# Patient Record
Sex: MALE | Race: WHITE | NOT HISPANIC OR LATINO | Employment: OTHER | URBAN - METROPOLITAN AREA
[De-identification: names, ages, dates, MRNs, and addresses within clinical notes are randomized per-mention and may not be internally consistent; named-entity substitution may affect disease eponyms.]

---

## 2017-03-24 ENCOUNTER — ALLSCRIPTS OFFICE VISIT (OUTPATIENT)
Dept: OTHER | Facility: OTHER | Age: 82
End: 2017-03-24

## 2017-04-03 ENCOUNTER — GENERIC CONVERSION - ENCOUNTER (OUTPATIENT)
Dept: OTHER | Facility: OTHER | Age: 82
End: 2017-04-03

## 2017-10-02 ENCOUNTER — ALLSCRIPTS OFFICE VISIT (OUTPATIENT)
Dept: OTHER | Facility: OTHER | Age: 82
End: 2017-10-02

## 2017-10-02 DIAGNOSIS — E78.00 PURE HYPERCHOLESTEROLEMIA: ICD-10-CM

## 2017-10-02 DIAGNOSIS — G40.909 EPILEPSY WITHOUT STATUS EPILEPTICUS, NOT INTRACTABLE (HCC): ICD-10-CM

## 2017-10-02 DIAGNOSIS — R73.9 HYPERGLYCEMIA: ICD-10-CM

## 2017-10-02 DIAGNOSIS — D69.6 THROMBOCYTOPENIA (HCC): ICD-10-CM

## 2017-10-16 ENCOUNTER — TRANSCRIBE ORDERS (OUTPATIENT)
Dept: LAB | Facility: CLINIC | Age: 82
End: 2017-10-16

## 2017-10-16 ENCOUNTER — GENERIC CONVERSION - ENCOUNTER (OUTPATIENT)
Dept: OTHER | Facility: OTHER | Age: 82
End: 2017-10-16

## 2017-10-16 ENCOUNTER — APPOINTMENT (OUTPATIENT)
Dept: LAB | Facility: CLINIC | Age: 82
End: 2017-10-16
Payer: MEDICARE

## 2017-10-16 DIAGNOSIS — E78.00 PURE HYPERCHOLESTEROLEMIA: ICD-10-CM

## 2017-10-16 DIAGNOSIS — G40.909 EPILEPSY WITHOUT STATUS EPILEPTICUS, NOT INTRACTABLE (HCC): ICD-10-CM

## 2017-10-16 DIAGNOSIS — R73.9 HYPERGLYCEMIA: ICD-10-CM

## 2017-10-16 DIAGNOSIS — D69.6 THROMBOCYTOPENIA (HCC): ICD-10-CM

## 2017-10-16 LAB
ALBUMIN SERPL BCP-MCNC: 3.6 G/DL (ref 3.5–5)
ALP SERPL-CCNC: 77 U/L (ref 46–116)
ALT SERPL W P-5'-P-CCNC: 24 U/L (ref 12–78)
ANION GAP SERPL CALCULATED.3IONS-SCNC: 6 MMOL/L (ref 4–13)
AST SERPL W P-5'-P-CCNC: 14 U/L (ref 5–45)
BASOPHILS # BLD AUTO: 0.03 THOUSANDS/ΜL (ref 0–0.1)
BASOPHILS NFR BLD AUTO: 1 % (ref 0–1)
BILIRUB SERPL-MCNC: 0.59 MG/DL (ref 0.2–1)
BUN SERPL-MCNC: 18 MG/DL (ref 5–25)
CALCIUM SERPL-MCNC: 8.8 MG/DL (ref 8.3–10.1)
CHLORIDE SERPL-SCNC: 107 MMOL/L (ref 100–108)
CHOLEST SERPL-MCNC: 184 MG/DL (ref 50–200)
CO2 SERPL-SCNC: 28 MMOL/L (ref 21–32)
CREAT SERPL-MCNC: 1.02 MG/DL (ref 0.6–1.3)
EOSINOPHIL # BLD AUTO: 0.24 THOUSAND/ΜL (ref 0–0.61)
EOSINOPHIL NFR BLD AUTO: 4 % (ref 0–6)
ERYTHROCYTE [DISTWIDTH] IN BLOOD BY AUTOMATED COUNT: 13.4 % (ref 11.6–15.1)
EST. AVERAGE GLUCOSE BLD GHB EST-MCNC: 91 MG/DL
GFR SERPL CREATININE-BSD FRML MDRD: 69 ML/MIN/1.73SQ M
GLUCOSE P FAST SERPL-MCNC: 94 MG/DL (ref 65–99)
HBA1C MFR BLD: 4.8 % (ref 4.2–6.3)
HCT VFR BLD AUTO: 44 % (ref 36.5–49.3)
HDLC SERPL-MCNC: 56 MG/DL (ref 40–60)
HGB BLD-MCNC: 15.1 G/DL (ref 12–17)
LDLC SERPL CALC-MCNC: 104 MG/DL (ref 0–100)
LYMPHOCYTES # BLD AUTO: 1.18 THOUSANDS/ΜL (ref 0.6–4.47)
LYMPHOCYTES NFR BLD AUTO: 21 % (ref 14–44)
MCH RBC QN AUTO: 33.5 PG (ref 26.8–34.3)
MCHC RBC AUTO-ENTMCNC: 34.3 G/DL (ref 31.4–37.4)
MCV RBC AUTO: 98 FL (ref 82–98)
MONOCYTES # BLD AUTO: 0.63 THOUSAND/ΜL (ref 0.17–1.22)
MONOCYTES NFR BLD AUTO: 11 % (ref 4–12)
NEUTROPHILS # BLD AUTO: 3.43 THOUSANDS/ΜL (ref 1.85–7.62)
NEUTS SEG NFR BLD AUTO: 63 % (ref 43–75)
NRBC BLD AUTO-RTO: 0 /100 WBCS
PLATELET # BLD AUTO: 109 THOUSANDS/UL (ref 149–390)
PMV BLD AUTO: 10.4 FL (ref 8.9–12.7)
POTASSIUM SERPL-SCNC: 4.2 MMOL/L (ref 3.5–5.3)
PROT SERPL-MCNC: 6.7 G/DL (ref 6.4–8.2)
RBC # BLD AUTO: 4.51 MILLION/UL (ref 3.88–5.62)
SODIUM SERPL-SCNC: 141 MMOL/L (ref 136–145)
TRIGL SERPL-MCNC: 120 MG/DL
WBC # BLD AUTO: 5.52 THOUSAND/UL (ref 4.31–10.16)

## 2017-10-16 PROCEDURE — 80053 COMPREHEN METABOLIC PANEL: CPT

## 2017-10-16 PROCEDURE — 36415 COLL VENOUS BLD VENIPUNCTURE: CPT

## 2017-10-16 PROCEDURE — 83036 HEMOGLOBIN GLYCOSYLATED A1C: CPT

## 2017-10-16 PROCEDURE — 80061 LIPID PANEL: CPT

## 2017-10-16 PROCEDURE — 85025 COMPLETE CBC W/AUTO DIFF WBC: CPT

## 2017-11-16 ENCOUNTER — TRANSCRIBE ORDERS (OUTPATIENT)
Dept: SLEEP CENTER | Facility: CLINIC | Age: 82
End: 2017-11-16

## 2017-11-16 ENCOUNTER — HOSPITAL ENCOUNTER (OUTPATIENT)
Dept: SLEEP CENTER | Facility: CLINIC | Age: 82
Discharge: HOME | End: 2017-11-16
Payer: MEDICARE

## 2017-11-16 DIAGNOSIS — G47.33 OBSTRUCTIVE SLEEP APNEA SYNDROME: Primary | ICD-10-CM

## 2017-11-17 NOTE — CONSULTS
Consultation - Ankur 207 81 y o  male MRN: [id number]          Reason for Consult / Principal Problem:    1  History of obstructive sleep apnea  2  Re-evaluation and continuance of care     HPI: Toño Hough is a 81y o  year old male  He has been using nasal CPAP since 1988 for treatment of obstructive sleep apnea  His diagnosis was initially made by Dr Laurie Burton count done at the [address] [place] [address]  He has been using nasal CPAP since that time  He has tolerated treatment quite well and requires continuing care as well as re-evaluation of diagnosis and treatment  Employment:  He is a retired     Sleep Schedule:       Bedtime:  11:30 p m  to midnight       Latency:  Brief      Wakeup time:  8:00 a m  to 8:30 a m  on weekdays, 6:30 a m  on Sundays    Awakenings:       Frequency:  0 per night      Causes:  None       Daytime Sleepiness / Inappropriate Sleep:       Most severe:  Early to mid afternoon       Naps :  3 per week   Time:  Early to mid afternoon   Duration:  30 minutes      Inappropriate drowsiness / sleep:  Sitting and reading, watching television, as a passenger in a car, lying down to rest in the afternoon    Snoring:  Loud without CPAP, associated snorting    Apnea: Witnessed by others    Change in Weight:  Fluctuation over the last several years by about 10 lb    RLS:  No clinical symptoms consistent with this diagnosis     Other Complaints:  Denied     Social History:      Caffeine:  20 oz of coffee daily       Tobacco:  Prior cigarette smoking, discontinued 50 years ago       Alcohol:  1 cocktail per day       Drugs:  Denied     Past medical, past surgical,and family history can be reviewed in the patient's chart  Allergies and medications can be reviewed in the patient's chart        Signs         Weight:    215 2 lb (97 7 kg)  Height:    71 inches  BMI:     29 8 kg   Blood Pressure:   138/70  Heart Rate:    66 and regular  Neck Circumference:  46 cm  ESS:     5    Physical Exam  General Appearance:   Alert, cooperative, no distress, appears stated age     Head:   Normocephalic, without obvious abnormality, atraumatic     Eyes:   PERRL, conjunctiva/corneas clear, EOM's intact          Nose:  Nares normal, septum midline, mucosa normal, no drainage or sinus tenderness     Throat:  Lips, teeth and gums normal; tongue slightly increased in size but normal in  shape and midline in position; mucosa redundant bilaterally, uvula not well visualized, tonsils not identified, Mallampati class 4       Neck:  Supple, symmetrical, trachea midline, no adenopathy; Thyroid: No enlargement, tenderness or nodules; no carotid bruit or JVD   Lungs:    Clear to auscultation bilaterally, respirations unlabored     Heart:   Regular rate and rhythm, S1 and S2 normal, no murmur, rub or gallop       Extremities:  Extremities normal, atraumatic, no cyanosis or edema     Pulses:  2+ and symmetric all extremities     Skin:  Skin color, texture, turgor normal, no rashes or lesions     Lymph nodes:      Cervical and supraclavicular normal       Neurologic:    CNII-XII intact  Normal strength, sensation throughout     Sleep Study Results: The result of his original study are currently not available  The patient reports that he was diagnosed with severe sleep apnea and was placed on nasal CPAP using 10 cm of water pressure  ASSESSMENT / PLAN     Assessment:  1  History of obstructive sleep apnea  2  Long-term history of treatment using nasal CPAP  3  History of seizure disorder-currently well controlled    Plan:  1  Repeat polysomnogram as late night study  2  Supply replacement equipment as necessary  3  Follow-up for compliance data 4 to 12 weeks after receiving new equipment    Counseling / Coordination of Care  Total clinic time spent today 40 minutes   Greater than 50% of total time was spent with the patient and / or family counseling and / or coordination of care  A description of the counseling / coordination of care: We discussed his previous diagnosis of obstructive sleep apnea as well as ongoing use of nasal CPAP  He has been at the same level of pressure since the time of his initial diagnosis he feels quite well using this treatment  There is some evidence of mild daytime sleepiness  He denies snoring or other sounds while wearing nasal CPAP  Most of our time together was spent talking about his use of nasal CPAP  His compliance seems to be excellent  I asked him about caring for his equipment  We then talked about typical methods and frequency of cleaning his mask, tubing and humidifier chamber  We also talked about changing filters periodic limb to ensure proper treatment parameters  He is aware that he has evidence of mild daytime sleepiness  I have told him that this may indicate a small amount of residual abnormal breathing  His study will be performed in split night fashion so that we can both identify both the presence and severity of his obstructive sleep apnea  Nasal CPAP will then be titrated to the most appropriate pressure to maintain upper airway patency during sleep  Following testing he will return to the [facility] for a review of his test results              Emelie Olszewski, DO    Board Certified in Sleep Disorders Medicine

## 2017-12-03 ENCOUNTER — HOSPITAL ENCOUNTER (OUTPATIENT)
Dept: SLEEP CENTER | Facility: CLINIC | Age: 82
Discharge: HOME | End: 2017-12-03
Payer: MEDICARE

## 2017-12-03 DIAGNOSIS — G47.33 OBSTRUCTIVE SLEEP APNEA SYNDROME: ICD-10-CM

## 2017-12-03 PROCEDURE — 95810 POLYSOM 6/> YRS 4/> PARAM: CPT

## 2017-12-08 ENCOUNTER — TRANSCRIBE ORDERS (OUTPATIENT)
Dept: SLEEP CENTER | Facility: CLINIC | Age: 82
End: 2017-12-08

## 2017-12-08 DIAGNOSIS — G47.33 OSA (OBSTRUCTIVE SLEEP APNEA): Primary | ICD-10-CM

## 2017-12-27 ENCOUNTER — HOSPITAL ENCOUNTER (OUTPATIENT)
Dept: SLEEP CENTER | Facility: CLINIC | Age: 82
Discharge: HOME | End: 2017-12-28
Payer: MEDICARE

## 2017-12-27 DIAGNOSIS — G47.33 OSA (OBSTRUCTIVE SLEEP APNEA): ICD-10-CM

## 2017-12-27 PROCEDURE — 95811 POLYSOM 6/>YRS CPAP 4/> PARM: CPT

## 2018-01-04 ENCOUNTER — TRANSCRIBE ORDERS (OUTPATIENT)
Dept: SLEEP CENTER | Facility: CLINIC | Age: 83
End: 2018-01-04

## 2018-01-04 DIAGNOSIS — G47.33 OSA (OBSTRUCTIVE SLEEP APNEA): Primary | ICD-10-CM

## 2018-01-05 ENCOUNTER — HOSPITAL ENCOUNTER (OUTPATIENT)
Dept: SLEEP CENTER | Facility: CLINIC | Age: 83
Discharge: HOME | End: 2018-01-06
Payer: MEDICARE

## 2018-01-05 ENCOUNTER — TRANSCRIBE ORDERS (OUTPATIENT)
Dept: SLEEP CENTER | Facility: CLINIC | Age: 83
End: 2018-01-05

## 2018-01-05 DIAGNOSIS — G47.33 OBSTRUCTIVE SLEEP APNEA SYNDROME: Primary | ICD-10-CM

## 2018-01-05 DIAGNOSIS — G47.33 OBSTRUCTIVE SLEEP APNEA SYNDROME: ICD-10-CM

## 2018-01-05 NOTE — PROGRESS NOTES
Progress Note - Sleep Center   Manoj Diamond 81 y o  male MRN: [id number]        Follow Up Evaluation / Problem:     1  History of obstructive sleep apnea  2  Need for replacement CPAP machine    3  History of seizure disorder-currently well controlled    HPI: Vianey Buckner is a 81y o  year old male  He has a long history of obstructive sleep apnea and has been using nasal CPAP consistently for many years  He reports that his most recent equipment was obtained in about 2003  His previous CPAP setting was 10 centimeters of water  He originally arrived here looking for continuation of care for obstructive sleep apnea and to obtain new CPAP equipment if possible  ROS: See HPI, all other systems are negative  Historical Information     Past History Since Last Sleep Center Visit:     A diagnostic polysomnogram completed on 12/03/2017 was abnormal demonstrating an AHI of 19 2 events per hour  During sleep in the supine position this value reached 72 events per hour  Lowest measured oxygen saturation was 87 percent  Nasal CPAP was titrated on 12/27/2017  He seemed to be fairly well controlled using 9 centimeters of nasal CPAP  ALLERGIES  Recorded on medication sheet in chart     MEDICATION  Recorded on medication sheet in chart    OBJECTIVE    Vital signs are recorded in patient's chart    PAP Pressure:  Nasal CPAP set to deliver 10 centimeters of water pressure  DME Provider: [employer]    Physical Exam: No significant changes other than reported in HPI  ASSESSMENT / PLAN    Assessment:   1  Obstructive sleep apnea  2  Good response to nasal CPAP using 9 centimeters of water pressure  3  Previous use of CPAP set to deliver 10 centimeters of water pressure  4  History of seizure disorder-currently well controlled    Plan:  1  The patient will receive new CPAP equipment and supplies  2  CPAP will be set to deliver 10 centimeters of water pressure  3    Compliance data will be obtained 6 to 12 weeks following initiation of treatment    Counseling / Coordination of Care  Total clinic time spent today 20 minutes  Greater than 50% of total time was spent with The patient and / or family counseling and / or coordination of care  A description of the counseling and / or coordination of care: Most of our time was spent reviewing his polysomnographic studies  His diagnostic study showed moderate obstructive sleep apnea which became very severe in the supine position  During the testing phase his abnormal nocturnal breathing was well controlled using 9 centimeters of water pressure  He has been using 10 centimeters of CPAP over the years and is quite comfortable at that setting  He will receive new CPAP equipment and supplies  His equipment will be set to deliver 10 centimeters of water pressure  He will return in 6 to 12 weeks for a download of compliance data and a review of his clinical condition  Any necessary changes will be made based on his response to treatment  Dagoberto Beltran DO    Board Certified in [employer]

## 2018-01-09 ENCOUNTER — HOSPITAL ENCOUNTER (OUTPATIENT)
Dept: SLEEP CENTER | Facility: CLINIC | Age: 83
Discharge: HOME | End: 2018-01-10

## 2018-01-09 DIAGNOSIS — G47.33 OSA (OBSTRUCTIVE SLEEP APNEA): ICD-10-CM

## 2018-01-11 NOTE — RESULT NOTES
Verified Results  (1) COMPREHENSIVE METABOLIC PANEL [id number] 23:83BC Julianna Lynne    Order Number: [id number]     Test Name Result Flag Reference   SODIUM 141 mmol/L  136-145   POTASSIUM 4 2 mmol/L  3 5-5 3   CHLORIDE 107 mmol/L  100-108   CARBON DIOXIDE 28 mmol/L  21-32   ANION GAP (CALC) 6 mmol/L  4-13   BLOOD UREA NITROGEN 18 mg/dL  5-25   CREATININE 1 02 mg/dL  0 60-1 30   Standardized to IDMS reference method   CALCIUM 8 8 mg/dL  8 3-10 1   BILI, TOTAL 0 59 mg/dL  0 20-1 00   ALK PHOSPHATAS 77 U/L     ALT (SGPT) 24 U/L  12-78   Specimen collection should occur prior to Sulfasalazine and/or Sulfapyridine administration due to the potential for falsely depressed results  AST(SGOT) 14 U/L  5-45   Specimen collection should occur prior to Sulfasalazine administration due to the potential for falsely depressed results  ALBUMIN 3 6 g/dL  3 5-5 0   TOTAL PROTEIN 6 7 g/dL  6 4-8 2   eGFR 69 ml/min/1 73sq m     National Kidney Disease Education Program recommendations are as follows:  GFR calculation is accurate only with a steady state creatinine  Chronic Kidney disease less than 60 ml/min/1 73 sq  meters  Kidney failure less than 15 ml/min/1 73 sq  meters  GLUCOSE FASTING 94 mg/dL  65-99   Specimen collection should occur prior to Sulfasalazine administration due to the potential for falsely depressed results  Specimen collection should occur prior to Sulfapyridine administration due to the potential for falsely elevated results       (1) CBC/PLT/DIFF 16Oct2017 08:58AM [facility] Number: [id number]     Test Name Result Flag Reference   WBC COUNT 5 52 Thousand/uL  4 31-10 16   RBC COUNT 4 51 Million/uL  3 88-5 62   HEMOGLOBIN 15 1 g/dL  12 0-17 0   HEMATOCRIT 44 0 %  36 5-49 3   MCV 98 fL  82-98   MCH 33 5 pg  26 8-34 3   MCHC 34 3 g/dL  31 4-37 4   RDW 13 4 %  11 6-15 1   MPV 10 4 fL  8 9-12 7   PLATELET COUNT 732 Thousands/uL L 149-390   nRBC AUTOMATED 0 /100 WBCs     NEUTROPHILS RELATIVE PERCENT 63 %  43-75   LYMPHOCYTES RELATIVE PERCENT 21 %  14-44   MONOCYTES RELATIVE PERCENT 11 %  4-12   EOSINOPHILS RELATIVE PERCENT 4 %  0-6   BASOPHILS RELATIVE PERCENT 1 %  0-1   NEUTROPHILS ABSOLUTE COUNT 3 43 Thousands/? ??L  1 85-7 62   LYMPHOCYTES ABSOLUTE COUNT 1 18 Thousands/? ??L  0 60-4 47   MONOCYTES ABSOLUTE COUNT 0 63 Thousand/? ??L  0 17-1 22   EOSINOPHILS ABSOLUTE COUNT 0 24 Thousand/? ??L  0 00-0 61   BASOPHILS ABSOLUTE COUNT 0 03 Thousands/? ??L  0 00-0 10     (1) LIPID PANEL, FASTING [id number] 08:58AM Obinna Acevedoe Order Number: [id number]     Test Name Result Flag Reference   CHOLESTEROL 184 mg/dL     HDL,DIRECT 56 mg/dL  40-60   Specimen collection should occur prior to Metamizole administration due to the potential for falsley depressed results  LDL CHOLESTEROL CALCULATED 104 mg/dL H 0-100   Triglyceride:        Normal <150 mg/dl   Borderline High 150-199 mg/dl   High 200-499 mg/dl   Very High >499 mg/dl      Cholesterol:       Desirable <200 mg/dl    Borderline High 200-239 mg/dl    High >239 mg/dl      HDL Cholesterol:       High>59 mg/dL    Low <41 mg/dL      This screening LDL is a calculated result  It does not have the accuracy of the Direct Measured LDL in the monitoring of patients with hyperlipidemia and/or statin therapy  Direct Measure LDL (KAA108) must be ordered separately in these patients  TRIGLYCERIDES 120 mg/dL  <=150   Specimen collection should occur prior to N-Acetylcysteine or Metamizole administration due to the potential for falsely depressed results  (1) HEMOGLOBIN A1C 16Oct2017 08:58AM Armando Montgomery Yuriy Order Number: [id number]     Test Name Result Flag Reference   HEMOGLOBIN A1C 4 8 %  4 2-6 3   EST  AVG   GLUCOSE 91 mg/dl

## 2018-01-12 VITALS
BODY MASS INDEX: 30.78 KG/M2 | RESPIRATION RATE: 16 BRPM | SYSTOLIC BLOOD PRESSURE: 122 MMHG | DIASTOLIC BLOOD PRESSURE: 78 MMHG | WEIGHT: 215 LBS | HEIGHT: 70 IN | HEART RATE: 60 BPM

## 2018-01-14 VITALS
HEART RATE: 60 BPM | DIASTOLIC BLOOD PRESSURE: 80 MMHG | WEIGHT: 218 LBS | HEIGHT: 70 IN | RESPIRATION RATE: 16 BRPM | SYSTOLIC BLOOD PRESSURE: 128 MMHG | BODY MASS INDEX: 31.21 KG/M2

## 2018-01-15 NOTE — RESULT NOTES
Verified Results  (1) LIPID PANEL, FASTING [id number] 09:33AM Armando Arciniega Order Number: [id number]     Test Name Result Flag Reference   CHOLESTEROL 205 mg/dL H    HDL,DIRECT 60 mg/dL  40-60   Specimen collection should occur prior to Metamizole administration due to the potential for falsely depressed results  LDL CHOLESTEROL CALCULATED 116 mg/dL H 0-100   - Patient Instructions: This is a fasting blood test  Water,black tea or black  coffee only after 9:00pm the night before test   Drink 2 glasses of water the morning of test     - Patient Instructions: This is a fasting blood test  Water,black tea or black  coffee only after 9:00pm the night before test Drink 2 glasses of water the morning of test   Triglyceride:         Normal              <150 mg/dl       Borderline High    150-199 mg/dl       High               200-499 mg/dl       Very High          >499 mg/dl  Cholesterol:         Desirable        <200 mg/dl      Borderline High  200-239 mg/dl      High             >239 mg/dl  HDL Cholesterol:        High    >59 mg/dL      Low     <41 mg/dL  LDL CALCULATED:    This screening LDL is a calculated result  It does not have the accuracy of the Direct Measured LDL in the monitoring of patients with hyperlipidemia and/or statin therapy  Direct Measure LDL (GSC404) must be ordered separately in these patients  TRIGLYCERIDES 145 mg/dL  <=150   Specimen collection should occur prior to N-Acetylcysteine or Metamizole administration due to the potential for falsely depressed results  (1) COMPREHENSIVE METABOLIC PANEL [id number] 89:69UY Armando Johnson Arciniega Order Number: [id number]     Test Name Result Flag Reference   GLUCOSE,RANDM 97 mg/dL     If the patient is fasting, the ADA then defines impaired fasting glucose as > 100 mg/dL and diabetes as > or equal to 123 mg/dL     SODIUM 138 mmol/L  136-145   POTASSIUM 4 2 mmol/L  3 5-5 3   CHLORIDE 103 mmol/L  100-108 CARBON DIOXIDE 30 mmol/L  21-32   ANION GAP (CALC) 5 mmol/L  4-13   BLOOD UREA NITROGEN 19 mg/dL  5-25   CREATININE 1 06 mg/dL  0 60-1 30   Standardized to IDMS reference method   CALCIUM 8 6 mg/dL  8 3-10 1   BILI, TOTAL 0 74 mg/dL  0 20-1 00   ALK PHOSPHATAS 79 U/L     ALT (SGPT) 24 U/L  12-78   AST(SGOT) 15 U/L  5-45   ALBUMIN 3 6 g/dL  3 5-5 0   TOTAL PROTEIN 6 6 g/dL  6 4-8 2   eGFR Non-African American      >60 0 ml/min/1 73sq m   - Patient Instructions: This is a fasting blood test  Water,black tea or black  coffee only after 9:00pm the night before test Drink 2 glasses of water the morning of test   National Kidney Disease Education Program recommendations are as follows:  GFR calculation is accurate only with a steady state creatinine  Chronic Kidney disease less than 60 ml/min/1 73 sq  meters  Kidney failure less than 15 ml/min/1 73 sq  meters  (1) HEMOGLOBIN A1C 03Oct2016 09:33AM [facility] Order Number: [id number]     Test Name Result Flag Reference   HEMOGLOBIN A1C 4 7 %  4 2-6 3   EST  AVG  GLUCOSE 88 mg/dl       (1) PSA (SCREEN) (Dx V76 44 Screen for Prostate Cancer) [id number] 09:33AM [facility] Order Number: [id number]     Test Name Result Flag Reference   PROSTATE SPECIFIC ANTIGEN 3 0 ng/mL  0 0-4 0   - Patient Instructions: This test is non-fasting  Please drink two glasses of water morning of bloodwork  - Patient Instructions: This test is non-fasting  Please drink two glasses of water morning of bloodwork       (1) CBC/PLT/DIFF 03Oct2016 09:33AM [facility] Barrera Madison    Order Number: [id number]     Test Name Result Flag Reference   WBC COUNT 4 74 Thousand/uL  4 31-10 16   RBC COUNT 4 57 Million/uL  3 88-5 62   HEMOGLOBIN 15 4 g/dL  12 0-17 0   HEMATOCRIT 43 6 %  36 5-49 3   MCV 95 fL  82-98   MCH 33 7 pg  26 8-34 3   MCHC 35 3 g/dL  31 4-37 4   RDW 13 5 %  11 6-15 1   MPV 10 5 fL  8 9-12 7   PLATELET COUNT 736 Thousands/uL L 149-390   nRBC AUTOMATED 0 /100 WBCs     NEUTROPHILS RELATIVE PERCENT 67 %  43-75   LYMPHOCYTES RELATIVE PERCENT 20 %  14-44   MONOCYTES RELATIVE PERCENT 9 %  4-12   EOSINOPHILS RELATIVE PERCENT 4 %  0-6   BASOPHILS RELATIVE PERCENT 0 %  0-1   NEUTROPHILS ABSOLUTE COUNT 3 17 Thousands/?L  1 85-7 62   LYMPHOCYTES ABSOLUTE COUNT 0 94 Thousands/?L  0 60-4 47   MONOCYTES ABSOLUTE COUNT 0 44 Thousand/?L  0 17-1 22   EOSINOPHILS ABSOLUTE COUNT 0 17 Thousand/?L  0 00-0 61   BASOPHILS ABSOLUTE COUNT 0 02 Thousands/?L  0 00-0 10   - Patient Instructions: This bloodwork is non-fasting  Please drink two glasses of water morning of bloodwork  - Patient Instructions: This bloodwork is non-fasting  Please drink two glasses of water morning of bloodwork

## 2018-01-16 NOTE — PROGRESS NOTES
Assessment    1  Sleep apnea (780 57) (G47 30)   · on CPAP   2  Thrombocytopenia (287 5) (D69 6)   3  Hypercholesteremia (272 0) (E78 00)   4  Hyperglycemia (790 29) (R73 9)    Plan  Convulsions, epileptic, Thrombocytopenia    · (1) CBC/PLT/DIFF; Status:Active; Requested [id number];    · (1) COMPREHENSIVE METABOLIC PANEL; Status:Active; Requested [id number]:[id number];   Exercise counseling    · There are many exercise options for seniors ; Status:Complete;   Done: 35WMZ8433  Flu vaccine need    · Fluzone High-Dose 0 5 ML Intramuscular Suspension Prefilled Syringe  Hypercholesteremia    · (1) LIPID PANEL, FASTING; Status:Active; Requested [id number];   Hyperglycemia    · (1) HEMOGLOBIN A1C; Status:Active; Requested [id number];   Medicare annual wellness visit, subsequent    · *VB - Fall Risk Assessment  (Dx Z13 89 Screen for Neurologic Disorder);  Status:Complete;   Done: 87RUQ4924 03:09PM   · *VB - Urinary Incontinence Screen (Dx Z13 89 Screen for UI); Status:Complete;   Done:  77LYN4236 03:09PM   · *VB-Depression Screening; Status:Complete;   Done: 69EHU3337 03:09PM  Sleep apnea    · 1 - Patricia Beltran DO  (Neurology) Co-Management  *  Status: Complete  Done:  80WKY5275  Care Summary provided  : Yes    Discussion/Summary    Patient presented today for Medicare wellness visit  He is doing quite well overall  He remains active without significant limitations  He is having no cognitive issues or psychiatric problems  He is able to complete all of his own ADLs without difficulty  He does have some minor hearing loss and follows routinely with audiology  He is up-to-date on all vaccines  He no longer requires colonoscopy or prostate cancer screening  He has a living will and will get us a copy of it at some point  Impression: Subsequent Annual Wellness Visit  Chief Complaint  AWV  Flu shot given today      History of Present Illness  Welcome to Medicare and Wellness Visits:  The patient is being seen for the subsequent annual wellness visit  Medicare Screening and Risk Factors   Hospitalizations: no previous hospitalizations  Medicare Screening Tests Risk Questions   Drug and Alcohol Use: The patient is a former cigarette smoker  The patient reports occasional alcohol use  He has never used illicit drugs  Diet and Physical Activity: Current diet includes well balanced meals, low salt food choices, limited junk food, 1 servings of vegetables per day, 1 servings of meat per day, 1 servings of dairy products per day, 2-3 cups of coffee per day and 2 cups of water daily  He exercises infrequently and exercises 1-2 times per week  Exercise: walking, stretching  Mood Disorder and Cognitive Impairment Screening:   Depression screening  negative for symptoms  He denies feeling down, depressed, or hopeless over the past two weeks  He denies feeling little interest or pleasure in doing things over the past two weeks  Cognitive impairment screening: denies difficulty learning/retaining new information, denies difficulty handling complex tasks, denies difficulty with reasoning, denies difficulty with spatial ability and orientation, denies difficulty with language and denies difficulty with behavior  Functional Ability/Level of Safety: Hearing is slightly decreased  He denies hearing difficulties  He uses a hearing aid  Activities of daily living details: does not need help using the phone, no transportation help needed, does not need help shopping, no meal preparation help needed, does not need help doing housework, does not need help doing laundry, does not need help managing medications and does not need help managing money  Fall risk factors: The patient fell 0 times in the past 12 months  Home safety risk factors:  loose rugs, but no unfamiliar surroundings, no poor household lighting, no uneven floors, no household clutter, grab bars in the bathroom and handrails on the stairs     Advance Directives: Advance directives: living will, durable power of  for health care directives, advance directives and Pt Instructed to bring in copy of living will at next appt  Co-Managers and Medical Equipment/Suppliers: See Patient Care Team      Active Problems    1  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)   2  Convulsions, 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)   3  Flu vaccine need (V04 81) (Z23)   4  Hypercholesteremia (272 0) (E78 00)   5  Hyperglycemia (790 29) (R73 9)   6  Medicare annual wellness visit, subsequent (V70 0) (Z00 00)   7  Sleep apnea (780 57) (G47 30)   8  Thrombocytopenia (287 5) (D69 6)    Past Medical History    · History of vertigo (V12 49) (Z87 898)   · History of Impacted cerumen, unspecified laterality (380 4) (H61 20)   · History of Need for pneumococcal vaccination (V03 82) (Z23)    Surgical History    · History of Blepharoplasty Upper Lid W/ Excessive Skin   · History of Complete Colonoscopy For Polyp Removal   · History of Gingival Incision    Family History  Family History    · Family history of Stroke Syndrome (V17 1)    Social History    · Being A Social Drinker   · Former smoker (P52 99) (J14 546)   · Quit some time in 65s  Smoked for about 10 years a ppd    Current Meds   1  Aspirin Low Dose 81 MG TABS; Therapy: (Di Gao) to Recorded   2  Calcium + D TABS; TAKE 1 TABLET DAILY; Therapy: (Recorded:24Mar2017) to Recorded   3  Daily Multiple Vitamin TABS; Take 1 tablet daily; Therapy: (Recorded:24Mar2017) to Recorded   4  Keppra 1000 MG Oral Tablet; TAKE 1 TABLET TWICE DAILY; Therapy: (Recorded:24Wso5892) to Recorded    Allergies    1  No Known Drug Allergies    Immunizations   ** Printed in Appendix #1 below       Vitals  Signs    Heart Rate: 60  Respiration: 16  Systolic: 197  Diastolic: 78  Height: 5 ft 10 in  Weight: 215 lb   BMI Calculated: 30 85  BSA Calculated: 2 15    Results/Data  *VB - Fall Risk Assessment  (Dx Z13 89 Screen for Neurologic Disorder) 58YHL4406 03:09PM [place] Alfredo Barriga     Test Name Result Flag Reference   Falls Risk      No falls in the past year     *VB - Urinary Incontinence Screen (Dx Z13 89 Screen for UI) [id number] 03:09PM Matilde Yan     Test Name Result Flag Reference   Urinary Incontinence Assessment [id number]       *VB-Depression Screening 97VDE7021 03:09PM Matildezohra Mckeonw     Test Name Result Flag Reference   Depression Scale Result      Depression Screen - Negative For Symptoms       Health Management  Hyperglycemia   *VB - Eye Exam; every 1 year; Last 2016; Next Due: 73TPG5394; Near Due    Future Appointments    Date/Time Provider Specialty Site   10/03/2018 01:00 PM CHEEYNNE Regan   Family Medicine [facility] [address]     Signatures   Electronically signed by : CHEYENNE Emmanuel ; Oct  2 2017  9:12PM EST                       (Author)    Appendix #1     Patient: Melissa Robles; : 1935; MRN: [id number]      1 2 3 4 5 6    Influenza  10-Oct-2011 18-Oct-2012 04-Oct-2013 18-Oct-2014 10-Oct-2015 15-Sep-2016    PCV  25-Mar-2015         PPSV  18-Oct-2012         Td/DT  2004         Tdap  03-Oct-2016         Zoster  2010

## 2018-03-22 ENCOUNTER — OFFICE VISIT (OUTPATIENT)
Dept: SLEEP CENTER | Facility: CLINIC | Age: 83
End: 2018-03-22
Payer: MEDICARE

## 2018-03-22 VITALS
DIASTOLIC BLOOD PRESSURE: 64 MMHG | BODY MASS INDEX: 30.41 KG/M2 | HEART RATE: 72 BPM | WEIGHT: 217.2 LBS | SYSTOLIC BLOOD PRESSURE: 116 MMHG | HEIGHT: 71 IN

## 2018-03-22 DIAGNOSIS — E66.09 CLASS 1 OBESITY DUE TO EXCESS CALORIES WITHOUT SERIOUS COMORBIDITY WITH BODY MASS INDEX (BMI) OF 30.0 TO 30.9 IN ADULT: ICD-10-CM

## 2018-03-22 DIAGNOSIS — G47.33 OSA (OBSTRUCTIVE SLEEP APNEA): Primary | ICD-10-CM

## 2018-03-22 PROBLEM — E66.811 CLASS 1 OBESITY DUE TO EXCESS CALORIES WITHOUT SERIOUS COMORBIDITY WITH BODY MASS INDEX (BMI) OF 30.0 TO 30.9 IN ADULT: Status: ACTIVE | Noted: 2018-03-22

## 2018-03-22 PROCEDURE — 99213 OFFICE O/P EST LOW 20 MIN: CPT | Performed by: PSYCHIATRY & NEUROLOGY

## 2018-03-22 RX ORDER — LEVETIRACETAM 1000 MG/1
1 TABLET ORAL 2 TIMES DAILY
COMMUNITY

## 2018-03-22 NOTE — PATIENT INSTRUCTIONS
1   Continue nasal CPAP at 9 cm water  2  Supply prescription for new equipment over the next 12 months  3  Return to Sleep [address] in 1 year for routine re-evaluation  4    Contact the Center with any difficulties prior to that visit

## 2018-03-22 NOTE — PROGRESS NOTES
Progress Note - [address] 81 y o  male   :1935, MRN: [id number]  3/22/2018          Follow Up Evaluation / Problem:     Obstructive Sleep Apnea  Obesity    HPI: Elena Parrish is a 81y o  year old male  He is a fairly long history of obstructive sleep apnea and has been doing well using nasal CPAP  He recently was retested and has started to use a new equipment  New problems or denied present  Review of Systems      Genitourinary none   Cardiology none   Gastrointestinal heartburn/acid reflux   Neurology none   Constitutional none   Integumentary easy brusing   Psychiatry none   Musculoskeletal joint pain   Pulmonary none   ENT ringing in ears   Endocrine none   Hematological easy brusing       Current Outpatient Prescriptions:     levETIRAcetam (KEPPRA) 1000 MG tablet, Take 1 tablet by mouth 2 (two) times a day, Disp: , Rfl:     Seymour Sleepiness Scale  Sitting and reading: Slight chance of dozing  Watching TV: Slight chance of dozing  Sitting, inactive in a public place (e g  a theatre or a meeting): Would never doze  As a passenger in a car for an hour without a break: Slight chance of dozing  Lying down to rest in the afternoon when circumstances permit: Slight chance of dozing  Sitting and talking to someone: Would never doze  Sitting quietly after a lunch without alcohol: Would never doze  In a car, while stopped for a few minutes in traffic: Would never doze  Total score: 4              Vitals:    18 1200   BP: 116/64   Pulse: 72   Weight: 98 5 kg (217 lb 3 2 oz)   Height: 5' 11" (1 803 m)       Body mass index is 30 29 kg/m²  Neck Circumference: 46       EPWORTH SLEEPINESS SCORE  Total score: 4      Past History Since Last Sleep Center Visit:     A repeat polysomnogram was completed on 2017 prior to obtaining new CPAP equipment  Sleep apnea was treated well using 9 cm of nasal CPAP    Patient received his new equipment and has been using it at the settings without any difficulty  He and his wife reports that he does nap when relaxing but went up inactive he has no difficulty with excessive amounts of daytime sleepiness  The following portions of the patient's history were reviewed and updated as appropriate: allergies, current medications, past family history, past medical history, past social history, past surgical history and problem list     OBJECTIVE    PAP Pressure: Nasal CPAP set to deliver 9 cm of water pressure  DME Provider: [employer]    Physical Exam:     General Appearance:   Alert, cooperative, no distress, appears stated age, mildly obese     Head:   Normocephalic, without obvious abnormality, atraumatic     Eyes:   PERRL, conjunctiva/corneas clear, EOM's intact          Nose:  Nares normal, septum midline, no drainage or sinus tenderness           Throat:  Lips, teeth and gums normal; tongue normal size and  shape and midline; mucosa redundant bilaterally, uvula relatively normal, retracts upon phonation, tonsils not seen, Mallampati class 4      Neck:  Supple, symmetrical, trachea midline, no adenopathy; Thyroid: No enlargement, tenderness or nodules; no carotid bruit or JVD     Lungs:      Clear to auscultation bilaterally, respirations unlabored     Heart:   Regular rate and rhythm, S1 and S2 normal, no murmur, rub or gallop       Extremities:  Extremities normal, atraumatic, no cyanosis or edema     Pulses:  2+ and symmetric all extremities     Skin:  Skin color, texture, turgor normal, no rashes or lesions     Lymph nodes:      Cervical and supraclavicular normal     Neurologic:  CNII-XII intact   Normal strength, sensation throughout       ASSESSMENT / PLAN    1  SHON (obstructive sleep apnea)  Cpap DME   2  Class 1 obesity due to excess calories without serious comorbidity with body mass index (BMI) of 30 0 to 30 9 in adult             Counseling / Coordination of Care  Total clinic time spent today 15 minutes  Greater than 50% of total time was spent with the patient and / or family counseling and / or coordination of care  A description of the counseling / coordination of care:     Impressions, Diagnostic results, Instructions for management, Risks and benefits of treatment, Patient and family education and Risk factor reductions    The following instructions have been given to the patient today:    Patient Instructions   1  Continue nasal CPAP at 9 cm water  2  Supply prescription for new equipment over the next 12 months  3  Return to Sleep [address] [place] [address] in 1 year for routine re-evaluation  4    Contact the Center with any difficulties prior to that visit        Bryson Mcnair

## 2018-07-16 ENCOUNTER — TRANSITIONAL CARE MANAGEMENT (OUTPATIENT)
Dept: FAMILY MEDICINE CLINIC | Facility: CLINIC | Age: 83
End: 2018-07-16

## 2018-08-14 RX ORDER — SULFAMETHOXAZOLE AND TRIMETHOPRIM 800; 160 MG/1; MG/1
TABLET ORAL
Refills: 0 | COMMUNITY
Start: 2018-07-17 | End: 2018-08-15 | Stop reason: ALTCHOICE

## 2018-08-14 RX ORDER — CEFUROXIME AXETIL 500 MG/1
TABLET ORAL
Refills: 0 | COMMUNITY
Start: 2018-07-21 | End: 2018-08-15 | Stop reason: ALTCHOICE

## 2018-08-15 ENCOUNTER — OFFICE VISIT (OUTPATIENT)
Dept: FAMILY MEDICINE CLINIC | Facility: CLINIC | Age: 83
End: 2018-08-15
Payer: MEDICARE

## 2018-08-15 VITALS
SYSTOLIC BLOOD PRESSURE: 136 MMHG | HEART RATE: 56 BPM | RESPIRATION RATE: 16 BRPM | HEIGHT: 71 IN | DIASTOLIC BLOOD PRESSURE: 80 MMHG | WEIGHT: 207 LBS | BODY MASS INDEX: 28.98 KG/M2 | OXYGEN SATURATION: 97 %

## 2018-08-15 DIAGNOSIS — G47.33 OBSTRUCTIVE SLEEP APNEA SYNDROME: ICD-10-CM

## 2018-08-15 DIAGNOSIS — Z87.442 HISTORY OF RENAL STONE: Primary | ICD-10-CM

## 2018-08-15 DIAGNOSIS — N20.1 LEFT URETERAL STONE: ICD-10-CM

## 2018-08-15 DIAGNOSIS — G40.A09 NONINTRACTABLE ABSENCE EPILEPSY WITHOUT STATUS EPILEPTICUS (HCC): ICD-10-CM

## 2018-08-15 PROBLEM — G47.30 SLEEP APNEA: Status: ACTIVE | Noted: 2018-03-22

## 2018-08-15 PROBLEM — N20.0 KIDNEY STONE: Status: ACTIVE | Noted: 2018-07-20

## 2018-08-15 PROBLEM — N20.0 KIDNEY STONE: Status: RESOLVED | Noted: 2018-07-20 | Resolved: 2018-08-15

## 2018-08-15 PROCEDURE — 99214 OFFICE O/P EST MOD 30 MIN: CPT | Performed by: FAMILY MEDICINE

## 2018-08-15 NOTE — PROGRESS NOTES
Assessment/Plan:    History of renal stone   He is here for follow-up status post hospitalization for left staghorn calculus  which required lithotripsy  He had 2 stents, most recently the 2nd stent was removed about 1 week ago  He is  asymptomatic currently  Sleep apnea   He is doing well with CPAP, status post new sleep study and new equipment  Convulsions, epileptic [facility])    He will continue follow-up with his neurologist   He will inquire about calcium supplementation and its relationship to the recent kidney stone  Diagnoses and all orders for this visit:    History of renal stone    Obstructive sleep apnea syndrome    Nonintractable absence epilepsy without status epilepticus ([facility] [address] )    Left ureteral stone          Subjective:      Patient ID: Winnie Castellanos is a 81 y o  male  He is here for follow-up  He developed a very large kidney stone in July and was admitted to [facility] for ureteroscopy and  lithotripsy  He  Had a left ureteral stents for 3 weeks which was just removed  Currently he feels well and denies flank pain  or abdominal pain  He is concerned because he thinks the calcium  supplementationmay have contributed to the kidney stone formation, as he had never had a kidney stone prior to this one  He has been taking calcium to help decrease side effects of the Keppra  He will discuss this with his neurologist and urologist     He has been drinking lots of water into knows he should avoid ice tea  The following portions of the patient's history were reviewed and updated as appropriate: allergies, current medications, past family history, past medical history, past social history, past surgical history and problem list     Review of Systems   Constitutional: Negative for activity change, chills, fatigue and fever  HENT: Negative for congestion, ear pain, sinus pressure and sore throat  Eyes: Negative for pain and visual disturbance     Respiratory: Negative for cough, chest tightness, shortness of breath and wheezing  Cardiovascular: Negative for chest pain, palpitations and leg swelling  Gastrointestinal: Negative for abdominal pain, blood in stool, constipation, diarrhea, nausea and vomiting  Endocrine: Negative for polydipsia and polyuria  Genitourinary: Negative for difficulty urinating, dysuria, frequency and urgency  Musculoskeletal: Negative for arthralgias, joint swelling and myalgias  Skin: Negative for rash  Neurological: Negative for dizziness, weakness, numbness and headaches  Hematological: Negative for adenopathy  Does not bruise/bleed easily  Psychiatric/Behavioral: Negative for dysphoric mood  The patient is not nervous/anxious  Objective:      /80   Pulse 56   Resp 16   Ht 5' 11" (1 803 m)   Wt 93 9 kg (207 lb)   SpO2 97%   BMI 28 87 kg/m²          Physical Exam   Constitutional: He is oriented to person, place, and time  He appears well-developed and well-nourished  Neck: Normal range of motion  Neck supple  No thyromegaly present  Carotid pulses 2+ bilaterally without bruit   Cardiovascular: Normal rate, regular rhythm and normal heart sounds  Pulmonary/Chest: Effort normal and breath sounds normal    Abdominal: Soft  Bowel sounds are normal  There is no tenderness  Musculoskeletal: He exhibits edema  Mild ankle edema bilaterally   Lymphadenopathy:     He has no cervical adenopathy  Neurological: He is alert and oriented to person, place, and time  Skin: Skin is warm  Nursing note and vitals reviewed

## 2018-08-15 NOTE — ASSESSMENT & PLAN NOTE
He will continue follow-up with his neurologist   He will inquire about calcium supplementation and its relationship to the recent kidney stone

## 2018-08-15 NOTE — ASSESSMENT & PLAN NOTE
He is here for follow-up status post hospitalization for left staghorn calculus  which required lithotripsy  He had 2 stents, most recently the 2nd stent was removed about 1 week ago  He is  asymptomatic currently

## 2018-09-05 ENCOUNTER — APPOINTMENT (OUTPATIENT)
Dept: LAB | Facility: CLINIC | Age: 83
End: 2018-09-05
Payer: MEDICARE

## 2018-09-05 DIAGNOSIS — N20.1 CALCULUS OF URETER: Primary | ICD-10-CM

## 2018-09-05 DIAGNOSIS — G40.209 COMPLEX PARTIAL SEIZURES WITH CONSCIOUSNESS IMPAIRED (HCC): ICD-10-CM

## 2018-09-05 LAB
ALBUMIN SERPL BCP-MCNC: 3.6 G/DL (ref 3.5–5)
ALP SERPL-CCNC: 79 U/L (ref 46–116)
ALT SERPL W P-5'-P-CCNC: 27 U/L (ref 12–78)
ANION GAP SERPL CALCULATED.3IONS-SCNC: 6 MMOL/L (ref 4–13)
AST SERPL W P-5'-P-CCNC: 22 U/L (ref 5–45)
BILIRUB SERPL-MCNC: 0.8 MG/DL (ref 0.2–1)
BUN SERPL-MCNC: 20 MG/DL (ref 5–25)
CALCIUM SERPL-MCNC: 8.9 MG/DL (ref 8.3–10.1)
CHLORIDE SERPL-SCNC: 109 MMOL/L (ref 100–108)
CO2 SERPL-SCNC: 28 MMOL/L (ref 21–32)
CREAT SERPL-MCNC: 1 MG/DL (ref 0.6–1.3)
GFR SERPL CREATININE-BSD FRML MDRD: 70 ML/MIN/1.73SQ M
GLUCOSE P FAST SERPL-MCNC: 99 MG/DL (ref 65–99)
POTASSIUM SERPL-SCNC: 4.4 MMOL/L (ref 3.5–5.3)
PROT SERPL-MCNC: 6.8 G/DL (ref 6.4–8.2)
SODIUM SERPL-SCNC: 143 MMOL/L (ref 136–145)

## 2018-09-05 PROCEDURE — 80053 COMPREHEN METABOLIC PANEL: CPT

## 2018-09-05 PROCEDURE — 36415 COLL VENOUS BLD VENIPUNCTURE: CPT

## 2018-09-05 PROCEDURE — 80177 DRUG SCRN QUAN LEVETIRACETAM: CPT

## 2018-09-07 LAB — LEVETIRACETAM SERPL-MCNC: 46.7 UG/ML (ref 10–40)

## 2018-10-03 ENCOUNTER — OFFICE VISIT (OUTPATIENT)
Dept: FAMILY MEDICINE CLINIC | Facility: CLINIC | Age: 83
End: 2018-10-03
Payer: MEDICARE

## 2018-10-03 VITALS
RESPIRATION RATE: 18 BRPM | BODY MASS INDEX: 29.82 KG/M2 | HEIGHT: 71 IN | WEIGHT: 213 LBS | HEART RATE: 56 BPM | DIASTOLIC BLOOD PRESSURE: 78 MMHG | OXYGEN SATURATION: 97 % | SYSTOLIC BLOOD PRESSURE: 150 MMHG

## 2018-10-03 DIAGNOSIS — Z00.00 MEDICARE ANNUAL WELLNESS VISIT, SUBSEQUENT: Primary | ICD-10-CM

## 2018-10-03 DIAGNOSIS — Z23 FLU VACCINE NEED: ICD-10-CM

## 2018-10-03 DIAGNOSIS — Z87.442 HISTORY OF RENAL STONE: ICD-10-CM

## 2018-10-03 DIAGNOSIS — Z23 NEED FOR ZOSTER VACCINE: ICD-10-CM

## 2018-10-03 DIAGNOSIS — I10 ESSENTIAL HYPERTENSION: ICD-10-CM

## 2018-10-03 DIAGNOSIS — G47.33 OBSTRUCTIVE SLEEP APNEA SYNDROME: ICD-10-CM

## 2018-10-03 DIAGNOSIS — E66.09 CLASS 1 OBESITY DUE TO EXCESS CALORIES WITHOUT SERIOUS COMORBIDITY WITH BODY MASS INDEX (BMI) OF 30.0 TO 30.9 IN ADULT: ICD-10-CM

## 2018-10-03 DIAGNOSIS — G40.209 LOCALIZATION-RELATED FOCAL EPILEPSY WITH COMPLEX PARTIAL SEIZURES (HCC): ICD-10-CM

## 2018-10-03 DIAGNOSIS — D18.00 CAVERNOMA: ICD-10-CM

## 2018-10-03 PROCEDURE — G0439 PPPS, SUBSEQ VISIT: HCPCS | Performed by: FAMILY MEDICINE

## 2018-10-03 PROCEDURE — G0008 ADMIN INFLUENZA VIRUS VAC: HCPCS | Performed by: FAMILY MEDICINE

## 2018-10-03 PROCEDURE — 90662 IIV NO PRSV INCREASED AG IM: CPT | Performed by: FAMILY MEDICINE

## 2018-10-03 PROCEDURE — 99214 OFFICE O/P EST MOD 30 MIN: CPT | Performed by: FAMILY MEDICINE

## 2018-10-03 RX ORDER — FUROSEMIDE 20 MG/1
20 TABLET ORAL DAILY
Qty: 30 TABLET | Refills: 5 | Status: SHIPPED | OUTPATIENT
Start: 2018-10-03 | End: 2019-01-23 | Stop reason: SDUPTHER

## 2018-10-03 NOTE — PROGRESS NOTES
Assessment and Plan:    Problem List Items Addressed This Visit     None      Visit Diagnoses     Medicare annual wellness visit, subsequent    -  Primary    Flu vaccine need        Relevant Orders    influenza vaccine, 1378-3321, high-dose, PF 0 5 mL, for patients 65 yr+ (FLUZONE HIGH-DOSE) (Completed)    Need for zoster vaccine            Health Maintenance Due   Topic Date Due    Medicare Annual Wellness Visit (AWV)  1935     Patient presents today for Medicare wellness visit  Overall, he is doing quite well  He did have 1 hospitalization this past year for a kidney stone  He required some ureteral stenting and has followed with Urology  Last ultrasound done in September was clear  He does not require any further cancer screening at this time  He is having no problems with cognitive decline or depressed mood  He is quite active with no cardiovascular limitations  He completes all of his own activities of daily life without any limitations  He does have a complete living will  His wife is power of  with his son as a backup  HPI:  Sara Russell is a 81 y o  male here for his Subsequent Wellness Visit  Patient Active Problem List   Diagnosis    Sleep apnea    Class 1 obesity due to excess calories without serious comorbidity with body mass index (BMI) of 30 0 to 30 9 in adult    History of renal stone    Cavernoma    Convulsions, epileptic ([facility] [address] )    Hypercholesteremia    Localization-related focal epilepsy with complex partial seizures ([facility] [address] )     No past medical history on file    Past Surgical History:   Procedure Laterality Date    BLEPHAROPLASTY      last assessed: 03/24/2017; upper lid w/ excessive skin     COLONOSCOPY W/ POLYPECTOMY      MOUTH SURGERY      gingival incision due to Dilantin therapy      Family History   Problem Relation Age of Onset    Stroke Family         syndrome     History   Smoking Status    Former Smoker    Packs/day: 1 00    Years: 10 00    Quit date: 1960   Smokeless Tobacco    Never Used     Comment: Quit in the 1960's     History   Alcohol Use    Yes     Comment: Social       History   Drug Use No       Current Outpatient Prescriptions   Medication Sig Dispense Refill    aspirin 81 MG tablet Take 81 mg by mouth      DAILY MULTIPLE VITAMINS tablet Take 1 tablet by mouth daily      levETIRAcetam (KEPPRA) 1000 MG tablet Take 1 tablet by mouth 2 (two) times a day       No current facility-administered medications for this visit  No Known Allergies  Immunization History   Administered Date(s) Administered    Influenza 10/10/2008, 10/18/2014, 10/10/2015, 09/15/2016, 10/02/2017    Influenza Split High Dose Preservative Free IM 10/18/2012, 10/04/2013, 10/18/2014, 10/10/2015, 09/15/2016, 10/02/2017    Influenza TIV (IM) 10/10/2011    Influenza, high dose seasonal 0 5 mL 10/03/2018    Pneumococcal Conjugate 13-Valent 03/25/2015    Pneumococcal Polysaccharide PPV23 10/18/2012    Td (adult), adsorbed 07/01/2004    Tdap 10/03/2016    Zoster 02/25/2010       Patient Care Team:  Odessa Damon MD as PCP - General    Medicare Screening Tests and Risk Assessments:      Health Risk Assessment:  Patient rates overall health as good  Patient feels that their physical health rating is Same  Eyesight was rated as Same  Hearing was rated as Same  Patient feels that their emotional and mental health rating is Same  Pain experienced by patient in the last 7 days has been Some  Patient's pain rating has been 3/10  Emotional/Mental Health:  Patient has been feeling nervous/anxious  PHQ-9 Depression Screening:    Frequency of the following problems over the past two weeks:      1  Little interest or pleasure in doing things: 0 - not at all      2  Feeling down, depressed, or hopeless: 0 - not at all  PHQ-2 Score: 0          Broken Bones/Falls:     Fall Risk Assessment:    In the past year, patient has experienced: No history of falling in past year          Bladder/Bowel:  Patient has not leaked urine accidently in the last six months  Patient reports no loss of bowel control  Immunizations:  Patient has had a flu vaccination within the last year  Patient has received a pneumonia shot  Patient has received a shingles shot  Patient has received tetanus/diphtheria shot  Home Safety:  Patient does not have trouble with stairs inside or outside of their home  Patient currently reports that there are no safety hazards present in home, working smoke alarms, no working carbon monoxide detectors  Preventative Screenings:   no prostate cancer screen performed, colon cancer screen completed, cholesterol screen completed, glaucoma eye exam completed,     Nutrition:  Current diet: Regular, No Added Salt and Limited junk food with servings of the following:    Medications:  Patient is currently taking over-the-counter supplements  Patient is able to manage medications  Lifestyle Choices:  Patient reports no tobacco use  Patient has smoked or used tobacco in the past   Patient has stopped his tobacco use  Patient reports alcohol use  Alcohol use per week: 5  Patient drives a vehicle  Patient wears seat belt  Activities of Daily Living:  Can get out of bed by his or her self, able to dress self, able to make own meals, able to do own shopping, able to bathe self, can do own laundry/housekeeping, can manage own money, pay bills and track expenses    Previous Hospitalizations:  Hospitalization or ED visit in past 12 months  Number of hospitalizations within the last year: 1-2        Advanced Directives:  Patient has decided on a power of   Patient has spoken to designated power of   Patient has completed advanced directive          Preventative Screening/Counseling:      Cardiovascular:      General: Screening Not Indicated          Diabetes:      General: Screening Not Indicated          Colorectal Cancer: General: Screening Not Indicated          Prostate Cancer:      General: Screening Not Indicated          Osteoporosis:      General: Screening Not Indicated          AAA:      General: Screening Not Indicated          Glaucoma:      General: Screening Current          HIV:      General: Screening Not Indicated          Hepatitis C:      General: Screening Not Indicated        Advanced Directives:   Patient has living will for healthcare, has durable POA for healthcare,

## 2018-10-03 NOTE — PROGRESS NOTES
Assessment/Plan:2    Essential hypertension  I am going to start a low-dose of furosemide which may help with some of his dependent edema as well as his blood pressure  Follow up within the next 3 months for blood pressure check and have some blood work done prior to that  Hold on loop diuretic at this time with his history of calcium nephrolithiasis  Sleep apnea  Continue on CPAP    Localization-related focal epilepsy with complex partial seizures ([address] )  Keppra was a bit elevated with last labs  Will make sure that Neurology has reviewed this lab  He will be having blood work done in 3 months remain at which time a could be repeated if they would like  Fortunately, seizure disorder remains quite stable  Class 1 obesity due to excess calories without serious comorbidity with body mass index (BMI) of 30 0 to 30 9 in adult  Continue to be active and work on weight loss  Diagnoses and all orders for this visit:    Medicare annual wellness visit, subsequent    Flu vaccine need  -     influenza vaccine, 9780-2087, high-dose, PF 0 5 mL, for patients 65 yr+ (FLUZONE HIGH-DOSE)    Need for zoster vaccine  -     Zoster Vac Recomb Adjuvanted ([address]) 50 MCG SUSR; Inject 0 5 mL into a muscle once for 1 dose    Localization-related focal epilepsy with complex partial seizures (HCC)    Class 1 obesity due to excess calories without serious comorbidity with body mass index (BMI) of 30 0 to 30 9 in adult    History of renal stone    Cavernoma    Obstructive sleep apnea syndrome    Essential hypertension  -     Comprehensive metabolic panel; Future  -     Lipid Panel with Direct LDL reflex; Future  -     CBC and differential; Future  -     furosemide (LASIX) 20 mg tablet; Take 1 tablet (20 mg total) by mouth daily          Subjective:      Patient ID: Jennifer Kruger is a 81 y o  male  HPI patient presents today for follow-up for his chronic health issues    He has a history of seizure disorder and follows routinely with Neurology  He does have his blood work done routinely and recent labs revealed a mildly elevated Keppra level  He notes he did take his Keppra prefer the labs were done  He has a history of some persistent lower extremity swelling which has been present for several years  It is somewhat bothersome to him on occasion  Blood pressure is a bit elevated today and he denies any problems chest pain, shortness of breath, palpitations or lightheadedness  He recently had a calcium kidney stone which required stenting of his ureter  Fortunately, he has had no further flank pain or dysuria  He denies any hematuria  He has history of hyperlipidemia which has been mild and he is not on cholesterol medication at this time  He continues on CPAP for his sleep apnea  He denies excessive fatigue or snoring  The following portions of the patient's history were reviewed and updated as appropriate: allergies, current medications, past family history, past medical history, past social history, past surgical history and problem list     Review of Systems   Constitutional: Negative for appetite change, chills, fatigue, fever and unexpected weight change  HENT: Negative for trouble swallowing  Eyes: Negative for visual disturbance  Respiratory: Negative for cough, chest tightness, shortness of breath and wheezing  Cardiovascular: Negative for chest pain  Gastrointestinal: Negative for abdominal distention, abdominal pain, blood in stool, constipation and diarrhea  Endocrine: Negative for polyuria  Genitourinary: Negative for difficulty urinating and flank pain  Musculoskeletal: Negative for arthralgias and myalgias  Skin: Negative for rash  Neurological: Negative for dizziness and light-headedness  Hematological: Negative for adenopathy  Does not bruise/bleed easily  Psychiatric/Behavioral: Negative for sleep disturbance           Objective:      /78   Pulse 56   Resp 18   Ht 5' 10 5" (1 791 m)   Wt 96 6 kg (213 lb)   SpO2 97%   BMI 30 13 kg/m²          Physical Exam

## 2018-10-03 NOTE — ASSESSMENT & PLAN NOTE
I am going to start a low-dose of furosemide which may help with some of his dependent edema as well as his blood pressure  Follow up within the next 3 months for blood pressure check and have some blood work done prior to that  Hold on loop diuretic at this time with his history of calcium nephrolithiasis

## 2018-10-03 NOTE — ASSESSMENT & PLAN NOTE
Keppra was a bit elevated with last labs  Will make sure that Neurology has reviewed this lab  He will be having blood work done in 3 months remain at which time a could be repeated if they would like  Fortunately, seizure disorder remains quite stable

## 2019-01-16 ENCOUNTER — APPOINTMENT (OUTPATIENT)
Dept: LAB | Facility: CLINIC | Age: 84
End: 2019-01-16
Payer: MEDICARE

## 2019-01-16 DIAGNOSIS — I10 ESSENTIAL HYPERTENSION: ICD-10-CM

## 2019-01-16 LAB
ALBUMIN SERPL BCP-MCNC: 3.9 G/DL (ref 3.5–5)
ALP SERPL-CCNC: 87 U/L (ref 46–116)
ALT SERPL W P-5'-P-CCNC: 26 U/L (ref 12–78)
ANION GAP SERPL CALCULATED.3IONS-SCNC: 5 MMOL/L (ref 4–13)
AST SERPL W P-5'-P-CCNC: 20 U/L (ref 5–45)
BASOPHILS # BLD AUTO: 0.02 THOUSANDS/ΜL (ref 0–0.1)
BASOPHILS NFR BLD AUTO: 0 % (ref 0–1)
BILIRUB SERPL-MCNC: 0.79 MG/DL (ref 0.2–1)
BUN SERPL-MCNC: 23 MG/DL (ref 5–25)
CALCIUM SERPL-MCNC: 8.6 MG/DL (ref 8.3–10.1)
CHLORIDE SERPL-SCNC: 108 MMOL/L (ref 100–108)
CHOLEST SERPL-MCNC: 215 MG/DL (ref 50–200)
CO2 SERPL-SCNC: 28 MMOL/L (ref 21–32)
CREAT SERPL-MCNC: 1.14 MG/DL (ref 0.6–1.3)
EOSINOPHIL # BLD AUTO: 0.13 THOUSAND/ΜL (ref 0–0.61)
EOSINOPHIL NFR BLD AUTO: 3 % (ref 0–6)
ERYTHROCYTE [DISTWIDTH] IN BLOOD BY AUTOMATED COUNT: 13.4 % (ref 11.6–15.1)
GFR SERPL CREATININE-BSD FRML MDRD: 60 ML/MIN/1.73SQ M
GLUCOSE P FAST SERPL-MCNC: 92 MG/DL (ref 65–99)
HCT VFR BLD AUTO: 47.3 % (ref 36.5–49.3)
HDLC SERPL-MCNC: 49 MG/DL (ref 40–60)
HGB BLD-MCNC: 15.9 G/DL (ref 12–17)
IMM GRANULOCYTES # BLD AUTO: 0.01 THOUSAND/UL (ref 0–0.2)
IMM GRANULOCYTES NFR BLD AUTO: 0 % (ref 0–2)
LDLC SERPL CALC-MCNC: 142 MG/DL (ref 0–100)
LYMPHOCYTES # BLD AUTO: 0.94 THOUSANDS/ΜL (ref 0.6–4.47)
LYMPHOCYTES NFR BLD AUTO: 20 % (ref 14–44)
MCH RBC QN AUTO: 33.6 PG (ref 26.8–34.3)
MCHC RBC AUTO-ENTMCNC: 33.6 G/DL (ref 31.4–37.4)
MCV RBC AUTO: 100 FL (ref 82–98)
MONOCYTES # BLD AUTO: 0.41 THOUSAND/ΜL (ref 0.17–1.22)
MONOCYTES NFR BLD AUTO: 9 % (ref 4–12)
NEUTROPHILS # BLD AUTO: 3.15 THOUSANDS/ΜL (ref 1.85–7.62)
NEUTS SEG NFR BLD AUTO: 68 % (ref 43–75)
NRBC BLD AUTO-RTO: 0 /100 WBCS
PLATELET # BLD AUTO: 120 THOUSANDS/UL (ref 149–390)
PMV BLD AUTO: 10.3 FL (ref 8.9–12.7)
POTASSIUM SERPL-SCNC: 4.2 MMOL/L (ref 3.5–5.3)
PROT SERPL-MCNC: 7 G/DL (ref 6.4–8.2)
RBC # BLD AUTO: 4.73 MILLION/UL (ref 3.88–5.62)
SODIUM SERPL-SCNC: 141 MMOL/L (ref 136–145)
TRIGL SERPL-MCNC: 118 MG/DL
WBC # BLD AUTO: 4.66 THOUSAND/UL (ref 4.31–10.16)

## 2019-01-16 PROCEDURE — 85025 COMPLETE CBC W/AUTO DIFF WBC: CPT

## 2019-01-16 PROCEDURE — 36415 COLL VENOUS BLD VENIPUNCTURE: CPT

## 2019-01-16 PROCEDURE — 80061 LIPID PANEL: CPT

## 2019-01-16 PROCEDURE — 80053 COMPREHEN METABOLIC PANEL: CPT

## 2019-01-23 ENCOUNTER — OFFICE VISIT (OUTPATIENT)
Dept: FAMILY MEDICINE CLINIC | Facility: CLINIC | Age: 84
End: 2019-01-23
Payer: MEDICARE

## 2019-01-23 VITALS
DIASTOLIC BLOOD PRESSURE: 80 MMHG | OXYGEN SATURATION: 96 % | RESPIRATION RATE: 18 BRPM | HEIGHT: 71 IN | HEART RATE: 64 BPM | BODY MASS INDEX: 29.82 KG/M2 | SYSTOLIC BLOOD PRESSURE: 128 MMHG | WEIGHT: 213 LBS

## 2019-01-23 DIAGNOSIS — E78.00 HYPERCHOLESTEREMIA: Primary | ICD-10-CM

## 2019-01-23 DIAGNOSIS — G47.33 OBSTRUCTIVE SLEEP APNEA SYNDROME: ICD-10-CM

## 2019-01-23 DIAGNOSIS — I10 ESSENTIAL HYPERTENSION: ICD-10-CM

## 2019-01-23 DIAGNOSIS — E66.09 CLASS 1 OBESITY DUE TO EXCESS CALORIES WITHOUT SERIOUS COMORBIDITY WITH BODY MASS INDEX (BMI) OF 30.0 TO 30.9 IN ADULT: ICD-10-CM

## 2019-01-23 PROCEDURE — 99214 OFFICE O/P EST MOD 30 MIN: CPT | Performed by: FAMILY MEDICINE

## 2019-01-23 RX ORDER — FUROSEMIDE 20 MG/1
20 TABLET ORAL DAILY
Qty: 90 TABLET | Refills: 3 | Status: SHIPPED | OUTPATIENT
Start: 2019-01-23 | End: 2020-01-27 | Stop reason: SDUPTHER

## 2019-01-23 RX ORDER — ROSUVASTATIN CALCIUM 5 MG/1
5 TABLET, COATED ORAL DAILY
Qty: 90 TABLET | Refills: 3 | Status: SHIPPED | OUTPATIENT
Start: 2019-01-23 | End: 2020-01-27 | Stop reason: SDUPTHER

## 2019-01-23 NOTE — ASSESSMENT & PLAN NOTE
Continue furosemide which has helped a bit with blood pressure as well as his lower extremity swelling

## 2019-01-23 NOTE — PROGRESS NOTES
Assessment/Plan:       Problem List Items Addressed This Visit        Respiratory    Sleep apnea       Cardiovascular and Mediastinum    Essential hypertension     Continue furosemide which has helped a bit with blood pressure as well as his lower extremity swelling  Relevant Medications    furosemide (LASIX) 20 mg tablet    Other Relevant Orders    Comprehensive metabolic panel       Other    Class 1 obesity due to excess calories without serious comorbidity with body mass index (BMI) of 30 0 to 30 9 in adult    Hypercholesteremia - Primary     I discussed the risks and benefits of cholesterol medication  I believe he is a good candidate for at least a low-dose with statin in light of his elevated 10 year risk of 33%  He agrees to call me if he is having any issues with medication  He will get some blood work done about 3 months  Relevant Medications    rosuvastatin (CRESTOR) 5 mg tablet    Other Relevant Orders    Comprehensive metabolic panel    LDL cholesterol, direct            Subjective:      Patient ID: Asiya Woods is a 81 y o  male  HPI patient presents today for follow-up for his history of hypertension  He recently had some blood work done  He has a mildly elevated cholesterol  He has not been on statins as he typically has an elevated HDL  Ten year risk is elevated today, mostly based on his age  He remains very active  He denies any chest pain, shortness of breath, palpitations or lightheadedness  He does remain on Keppra for history of focal seizures and is having no side effects on the medication  He has sleep apnea and remains on CPAP routinely  He does have some mild fatigue intermittently        The following portions of the patient's history were reviewed and updated as appropriate: allergies, current medications, past family history, past medical history, past social history, past surgical history and problem list     Review of Systems   Constitutional: Negative for appetite change, chills, fatigue, fever and unexpected weight change  HENT: Negative for trouble swallowing  Eyes: Negative for visual disturbance  Respiratory: Negative for cough, chest tightness, shortness of breath and wheezing  Cardiovascular: Negative for chest pain  Gastrointestinal: Negative for abdominal distention, abdominal pain, blood in stool, constipation and diarrhea  Endocrine: Negative for polyuria  Genitourinary: Negative for difficulty urinating and flank pain  Musculoskeletal: Negative for arthralgias and myalgias  Skin: Negative for rash  Neurological: Negative for dizziness and light-headedness  Hematological: Negative for adenopathy  Does not bruise/bleed easily  Psychiatric/Behavioral: Negative for sleep disturbance  Objective:      /80   Pulse 64   Resp 18   Ht 5' 10 5" (1 791 m)   Wt 96 6 kg (213 lb)   SpO2 96%   BMI 30 13 kg/m²          Physical Exam   Constitutional: He is oriented to person, place, and time  He appears well-developed and well-nourished  No distress  HENT:   Head: Normocephalic  Eyes: Pupils are equal, round, and reactive to light  Right eye exhibits no discharge  Left eye exhibits no discharge  Neck: No tracheal deviation present  No thyromegaly present  Cardiovascular: Normal rate, regular rhythm and normal heart sounds  No murmur heard  Pulmonary/Chest: Effort normal  No respiratory distress  He has no wheezes  He has no rales  Abdominal: Soft  He exhibits no distension  There is no tenderness  Musculoskeletal: Normal range of motion  He exhibits no edema  Lymphadenopathy:     He has no cervical adenopathy  Neurological: He is alert and oriented to person, place, and time  No cranial nerve deficit  Skin: Skin is warm  He is not diaphoretic  No erythema  Psychiatric: He has a normal mood and affect  Judgment and thought content normal          Nannette Black MD    BMI Counseling:  Body mass index is 30 13 kg/m²  Discussed the patient's BMI with him  The BMI is above average  BMI counseling and education was provided to the patient  Nutrition recommendations include reducing portion sizes, decreasing overall calorie intake and 3-5 servings of fruits/vegetables daily  Exercise recommendations include moderate aerobic physical activity for 150 minutes/week

## 2019-01-23 NOTE — ASSESSMENT & PLAN NOTE
I discussed the risks and benefits of cholesterol medication  I believe he is a good candidate for at least a low-dose with statin in light of his elevated 10 year risk of 33%  He agrees to call me if he is having any issues with medication  He will get some blood work done about 3 months

## 2019-04-04 ENCOUNTER — OFFICE VISIT (OUTPATIENT)
Dept: SLEEP CENTER | Facility: CLINIC | Age: 84
End: 2019-04-04
Payer: MEDICARE

## 2019-04-04 VITALS
HEIGHT: 71 IN | DIASTOLIC BLOOD PRESSURE: 64 MMHG | WEIGHT: 213 LBS | SYSTOLIC BLOOD PRESSURE: 116 MMHG | BODY MASS INDEX: 29.82 KG/M2 | HEART RATE: 63 BPM

## 2019-04-04 DIAGNOSIS — G40.409 OTHER GENERALIZED EPILEPSY, NOT INTRACTABLE, WITHOUT STATUS EPILEPTICUS (HCC): ICD-10-CM

## 2019-04-04 DIAGNOSIS — G47.33 OSA (OBSTRUCTIVE SLEEP APNEA): Primary | ICD-10-CM

## 2019-04-04 DIAGNOSIS — E78.00 HYPERCHOLESTEREMIA: ICD-10-CM

## 2019-04-04 DIAGNOSIS — I10 ESSENTIAL HYPERTENSION: ICD-10-CM

## 2019-04-04 PROCEDURE — 99214 OFFICE O/P EST MOD 30 MIN: CPT | Performed by: PSYCHIATRY & NEUROLOGY

## 2019-06-06 ENCOUNTER — APPOINTMENT (OUTPATIENT)
Dept: LAB | Facility: CLINIC | Age: 84
End: 2019-06-06
Payer: MEDICARE

## 2019-06-06 DIAGNOSIS — E78.00 HYPERCHOLESTEREMIA: ICD-10-CM

## 2019-06-06 DIAGNOSIS — I10 ESSENTIAL HYPERTENSION: ICD-10-CM

## 2019-06-06 LAB
ALBUMIN SERPL BCP-MCNC: 3.8 G/DL (ref 3.5–5)
ALP SERPL-CCNC: 76 U/L (ref 46–116)
ALT SERPL W P-5'-P-CCNC: 27 U/L (ref 12–78)
ANION GAP SERPL CALCULATED.3IONS-SCNC: 5 MMOL/L (ref 4–13)
AST SERPL W P-5'-P-CCNC: 19 U/L (ref 5–45)
BILIRUB SERPL-MCNC: 0.58 MG/DL (ref 0.2–1)
BUN SERPL-MCNC: 22 MG/DL (ref 5–25)
CALCIUM SERPL-MCNC: 8.6 MG/DL (ref 8.3–10.1)
CHLORIDE SERPL-SCNC: 109 MMOL/L (ref 100–108)
CO2 SERPL-SCNC: 26 MMOL/L (ref 21–32)
CREAT SERPL-MCNC: 1.04 MG/DL (ref 0.6–1.3)
GFR SERPL CREATININE-BSD FRML MDRD: 67 ML/MIN/1.73SQ M
GLUCOSE P FAST SERPL-MCNC: 93 MG/DL (ref 65–99)
LDLC SERPL DIRECT ASSAY-MCNC: 79 MG/DL (ref 0–100)
POTASSIUM SERPL-SCNC: 4.4 MMOL/L (ref 3.5–5.3)
PROT SERPL-MCNC: 6.8 G/DL (ref 6.4–8.2)
SODIUM SERPL-SCNC: 140 MMOL/L (ref 136–145)

## 2019-06-06 PROCEDURE — 80053 COMPREHEN METABOLIC PANEL: CPT

## 2019-06-06 PROCEDURE — 36415 COLL VENOUS BLD VENIPUNCTURE: CPT

## 2019-06-06 PROCEDURE — 83721 ASSAY OF BLOOD LIPOPROTEIN: CPT

## 2019-07-24 ENCOUNTER — OFFICE VISIT (OUTPATIENT)
Dept: FAMILY MEDICINE CLINIC | Facility: CLINIC | Age: 84
End: 2019-07-24
Payer: MEDICARE

## 2019-07-24 VITALS
HEART RATE: 62 BPM | BODY MASS INDEX: 30.59 KG/M2 | TEMPERATURE: 98.2 F | DIASTOLIC BLOOD PRESSURE: 70 MMHG | RESPIRATION RATE: 18 BRPM | HEIGHT: 71 IN | SYSTOLIC BLOOD PRESSURE: 124 MMHG | WEIGHT: 218.5 LBS | OXYGEN SATURATION: 94 %

## 2019-07-24 DIAGNOSIS — D69.6 THROMBOCYTOPENIA (HCC): ICD-10-CM

## 2019-07-24 DIAGNOSIS — E66.09 CLASS 1 OBESITY DUE TO EXCESS CALORIES WITHOUT SERIOUS COMORBIDITY WITH BODY MASS INDEX (BMI) OF 30.0 TO 30.9 IN ADULT: ICD-10-CM

## 2019-07-24 DIAGNOSIS — G47.33 OBSTRUCTIVE SLEEP APNEA SYNDROME: ICD-10-CM

## 2019-07-24 DIAGNOSIS — I10 ESSENTIAL HYPERTENSION: Primary | ICD-10-CM

## 2019-07-24 DIAGNOSIS — G40.209 LOCALIZATION-RELATED FOCAL EPILEPSY WITH COMPLEX PARTIAL SEIZURES (HCC): ICD-10-CM

## 2019-07-24 DIAGNOSIS — E78.00 HYPERCHOLESTEREMIA: ICD-10-CM

## 2019-07-24 PROCEDURE — 99214 OFFICE O/P EST MOD 30 MIN: CPT | Performed by: FAMILY MEDICINE

## 2019-07-24 NOTE — ASSESSMENT & PLAN NOTE
His prior history of low platelet count  Repeat with next labs  He only takes aspirin every other day

## 2019-07-24 NOTE — PROGRESS NOTES
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)     Continue on Keppra         Relevant Orders    CBC and differential    Comprehensive metabolic panel       Other    Class 1 obesity due to excess calories without serious comorbidity with body mass index (BMI) of 30 0 to 30 9 in adult     We discussed dietary changes  Hypercholesteremia     Much improved with low-dose Crestor  Continue to monitor lipids in 6 months time  Relevant Orders    Comprehensive metabolic panel    Lipid Panel with Direct LDL reflex    Thrombocytopenia (HCC)     His prior history of low platelet count  Repeat with next labs  He only takes aspirin every other day  Relevant Orders    CBC and differential        BMI Counseling: Body mass index is 30 91 kg/m²  Discussed the patient's BMI with him  The BMI is above average  BMI counseling and education was provided to the patient  Nutrition recommendations include reducing portion sizes, decreasing overall calorie intake and 3-5 servings of fruits/vegetables daily  Exercise recommendations include moderate aerobic physical activity for 150 minutes/week  Subjective:      Patient ID: Toño Hough is a 81 y o  male  HPI   The patient presents today for a hypertension follow-up  Patient remains only on furosemide and denies any chest pain, shortness of breath, palpitations, lightheadedness or diaphoresis  Does have some occasional ankle swelling which seems quite stable  He has a history of sleep apnea    He is tolerating CPAP it continues to follow with sleep medicine  He tolerates statin therapy without significant myalgias  He has a seizure disorder  Fortunately has had no seizure in multiple years  He remains compliant with his Keppra  He has a history of thrombocytopenia  Currently denies any bleeding or bruising  He was having some ecchymoses when on daily aspirin so he has titrate this to every other day  The following portions of the patient's history were reviewed and updated as appropriate: allergies, current medications, past family history, past medical history, past social history, past surgical history and problem list       Current Outpatient Medications:     aspirin 81 MG tablet, Take 81 mg by mouth, Disp: , Rfl:     DAILY MULTIPLE VITAMINS tablet, Take 1 tablet by mouth daily, Disp: , Rfl:     furosemide (LASIX) 20 mg tablet, Take 1 tablet (20 mg total) by mouth daily, Disp: 90 tablet, Rfl: 3    levETIRAcetam (KEPPRA) 1000 MG tablet, Take 1 tablet by mouth 2 (two) times a day, Disp: , Rfl:     rosuvastatin (CRESTOR) 5 mg tablet, Take 1 tablet (5 mg total) by mouth daily, Disp: 90 tablet, Rfl: 3     Review of Systems   Constitutional: Negative for appetite change, chills, fatigue, fever and unexpected weight change  HENT: Negative for trouble swallowing  Eyes: Negative for visual disturbance  Respiratory: Negative for cough, chest tightness, shortness of breath and wheezing  Cardiovascular: Negative for chest pain  Gastrointestinal: Negative for abdominal distention, abdominal pain, blood in stool, constipation and diarrhea  Endocrine: Negative for polyuria  Genitourinary: Negative for difficulty urinating and flank pain  Musculoskeletal: Negative for arthralgias and myalgias  Skin: Negative for rash  Neurological: Negative for dizziness and light-headedness  Hematological: Negative for adenopathy  Does not bruise/bleed easily  Psychiatric/Behavioral: Negative for sleep disturbance  Objective:      /70   Pulse 62   Temp 98 2 °F (36 8 °C)   Resp 18   Ht 5' 10 5" (1 791 m)   Wt 99 1 kg (218 lb 8 oz)   SpO2 94%   BMI 30 91 kg/m²          Physical Exam   Constitutional: He is oriented to person, place, and time  He appears well-developed and well-nourished  No distress  HENT:   Head: Normocephalic  Eyes: Pupils are equal, round, and reactive to light  Right eye exhibits no discharge  Left eye exhibits no discharge  Neck: No tracheal deviation present  No thyromegaly present  Cardiovascular: Normal rate, regular rhythm and normal heart sounds  No murmur heard  Pulmonary/Chest: Effort normal  No respiratory distress  He has no wheezes  He has no rales  Abdominal: Soft  He exhibits no distension  There is no tenderness  Musculoskeletal: Normal range of motion  He exhibits no edema  Lymphadenopathy:     He has no cervical adenopathy  Neurological: He is alert and oriented to person, place, and time  No cranial nerve deficit  Skin: Skin is warm  He is not diaphoretic  No erythema  Psychiatric: He has a normal mood and affect   Judgment and thought content normal          Juanita Pond MD

## 2019-10-10 ENCOUNTER — IMMUNIZATIONS (OUTPATIENT)
Dept: FAMILY MEDICINE CLINIC | Facility: CLINIC | Age: 84
End: 2019-10-10
Payer: MEDICARE

## 2019-10-10 DIAGNOSIS — Z23 FLU VACCINE NEED: Primary | ICD-10-CM

## 2019-10-10 PROCEDURE — G0008 ADMIN INFLUENZA VIRUS VAC: HCPCS

## 2019-10-10 PROCEDURE — 90662 IIV NO PRSV INCREASED AG IM: CPT

## 2020-01-09 ENCOUNTER — TRANSCRIBE ORDERS (OUTPATIENT)
Dept: LAB | Facility: CLINIC | Age: 85
End: 2020-01-09

## 2020-01-09 ENCOUNTER — APPOINTMENT (OUTPATIENT)
Dept: LAB | Facility: CLINIC | Age: 85
End: 2020-01-09
Payer: COMMERCIAL

## 2020-01-09 DIAGNOSIS — G40.209 LOCALIZATION-RELATED FOCAL EPILEPSY WITH COMPLEX PARTIAL SEIZURES (HCC): Primary | ICD-10-CM

## 2020-01-09 DIAGNOSIS — G40.209 LOCALIZATION-RELATED FOCAL EPILEPSY WITH COMPLEX PARTIAL SEIZURES (HCC): ICD-10-CM

## 2020-01-09 DIAGNOSIS — I10 ESSENTIAL HYPERTENSION: ICD-10-CM

## 2020-01-09 DIAGNOSIS — D69.6 THROMBOCYTOPENIA (HCC): ICD-10-CM

## 2020-01-09 DIAGNOSIS — E78.00 HYPERCHOLESTEREMIA: ICD-10-CM

## 2020-01-09 LAB
ALBUMIN SERPL BCP-MCNC: 3.8 G/DL (ref 3.5–5)
ALP SERPL-CCNC: 75 U/L (ref 46–116)
ALT SERPL W P-5'-P-CCNC: 33 U/L (ref 12–78)
ANION GAP SERPL CALCULATED.3IONS-SCNC: 4 MMOL/L (ref 4–13)
AST SERPL W P-5'-P-CCNC: 20 U/L (ref 5–45)
BASOPHILS # BLD AUTO: 0.04 THOUSANDS/ΜL (ref 0–0.1)
BASOPHILS NFR BLD AUTO: 1 % (ref 0–1)
BILIRUB SERPL-MCNC: 0.59 MG/DL (ref 0.2–1)
BUN SERPL-MCNC: 20 MG/DL (ref 5–25)
CALCIUM SERPL-MCNC: 9 MG/DL (ref 8.3–10.1)
CHLORIDE SERPL-SCNC: 112 MMOL/L (ref 100–108)
CHOLEST SERPL-MCNC: 169 MG/DL (ref 50–200)
CO2 SERPL-SCNC: 29 MMOL/L (ref 21–32)
CREAT SERPL-MCNC: 1.03 MG/DL (ref 0.6–1.3)
EOSINOPHIL # BLD AUTO: 0.13 THOUSAND/ΜL (ref 0–0.61)
EOSINOPHIL NFR BLD AUTO: 3 % (ref 0–6)
ERYTHROCYTE [DISTWIDTH] IN BLOOD BY AUTOMATED COUNT: 13.2 % (ref 11.6–15.1)
GFR SERPL CREATININE-BSD FRML MDRD: 67 ML/MIN/1.73SQ M
GLUCOSE P FAST SERPL-MCNC: 98 MG/DL (ref 65–99)
HCT VFR BLD AUTO: 45.9 % (ref 36.5–49.3)
HDLC SERPL-MCNC: 65 MG/DL
HGB BLD-MCNC: 15.4 G/DL (ref 12–17)
IMM GRANULOCYTES # BLD AUTO: 0.01 THOUSAND/UL (ref 0–0.2)
IMM GRANULOCYTES NFR BLD AUTO: 0 % (ref 0–2)
LDLC SERPL CALC-MCNC: 93 MG/DL (ref 0–100)
LYMPHOCYTES # BLD AUTO: 0.77 THOUSANDS/ΜL (ref 0.6–4.47)
LYMPHOCYTES NFR BLD AUTO: 16 % (ref 14–44)
MCH RBC QN AUTO: 33.8 PG (ref 26.8–34.3)
MCHC RBC AUTO-ENTMCNC: 33.6 G/DL (ref 31.4–37.4)
MCV RBC AUTO: 101 FL (ref 82–98)
MONOCYTES # BLD AUTO: 0.44 THOUSAND/ΜL (ref 0.17–1.22)
MONOCYTES NFR BLD AUTO: 9 % (ref 4–12)
NEUTROPHILS # BLD AUTO: 3.41 THOUSANDS/ΜL (ref 1.85–7.62)
NEUTS SEG NFR BLD AUTO: 71 % (ref 43–75)
NRBC BLD AUTO-RTO: 0 /100 WBCS
PLATELET # BLD AUTO: 111 THOUSANDS/UL (ref 149–390)
PMV BLD AUTO: 10.8 FL (ref 8.9–12.7)
POTASSIUM SERPL-SCNC: 4.7 MMOL/L (ref 3.5–5.3)
PROT SERPL-MCNC: 7 G/DL (ref 6.4–8.2)
RBC # BLD AUTO: 4.56 MILLION/UL (ref 3.88–5.62)
SODIUM SERPL-SCNC: 145 MMOL/L (ref 136–145)
TRIGL SERPL-MCNC: 55 MG/DL
WBC # BLD AUTO: 4.8 THOUSAND/UL (ref 4.31–10.16)

## 2020-01-09 PROCEDURE — 80177 DRUG SCRN QUAN LEVETIRACETAM: CPT

## 2020-01-09 PROCEDURE — 85025 COMPLETE CBC W/AUTO DIFF WBC: CPT

## 2020-01-09 PROCEDURE — 36415 COLL VENOUS BLD VENIPUNCTURE: CPT

## 2020-01-09 PROCEDURE — 80053 COMPREHEN METABOLIC PANEL: CPT

## 2020-01-09 PROCEDURE — 80061 LIPID PANEL: CPT

## 2020-01-13 LAB — LEVETIRACETAM SERPL-MCNC: 18.1 UG/ML (ref 10–40)

## 2020-01-27 ENCOUNTER — OFFICE VISIT (OUTPATIENT)
Dept: FAMILY MEDICINE CLINIC | Facility: CLINIC | Age: 85
End: 2020-01-27
Payer: COMMERCIAL

## 2020-01-27 VITALS
WEIGHT: 213.6 LBS | BODY MASS INDEX: 29.9 KG/M2 | DIASTOLIC BLOOD PRESSURE: 82 MMHG | SYSTOLIC BLOOD PRESSURE: 124 MMHG | TEMPERATURE: 97.9 F | HEART RATE: 67 BPM | HEIGHT: 71 IN | OXYGEN SATURATION: 97 %

## 2020-01-27 DIAGNOSIS — I10 ESSENTIAL HYPERTENSION: Primary | ICD-10-CM

## 2020-01-27 DIAGNOSIS — E66.9 OBESITY, CLASS I, BMI 30-34.9: ICD-10-CM

## 2020-01-27 DIAGNOSIS — G40.209 LOCALIZATION-RELATED FOCAL EPILEPSY WITH COMPLEX PARTIAL SEIZURES (HCC): ICD-10-CM

## 2020-01-27 DIAGNOSIS — D69.6 THROMBOCYTOPENIA (HCC): ICD-10-CM

## 2020-01-27 DIAGNOSIS — E78.00 HYPERCHOLESTEREMIA: ICD-10-CM

## 2020-01-27 DIAGNOSIS — D18.00 CAVERNOMA: ICD-10-CM

## 2020-01-27 DIAGNOSIS — G47.33 OBSTRUCTIVE SLEEP APNEA SYNDROME: ICD-10-CM

## 2020-01-27 PROBLEM — I82.409 ACUTE THROMBOEMBOLISM OF DEEP VEINS OF LOWER EXTREMITY (HCC): Status: RESOLVED | Noted: 2020-01-27 | Resolved: 2020-01-27

## 2020-01-27 PROBLEM — R56.9 CONVULSIONS (HCC): Status: RESOLVED | Noted: 2020-01-27 | Resolved: 2020-01-27

## 2020-01-27 PROBLEM — I82.409 ACUTE THROMBOEMBOLISM OF DEEP VEINS OF LOWER EXTREMITY (HCC): Status: ACTIVE | Noted: 2020-01-27

## 2020-01-27 PROBLEM — R56.9 CONVULSIONS (HCC): Status: ACTIVE | Noted: 2020-01-27

## 2020-01-27 PROCEDURE — 3074F SYST BP LT 130 MM HG: CPT | Performed by: FAMILY MEDICINE

## 2020-01-27 PROCEDURE — 3079F DIAST BP 80-89 MM HG: CPT | Performed by: FAMILY MEDICINE

## 2020-01-27 PROCEDURE — 99214 OFFICE O/P EST MOD 30 MIN: CPT | Performed by: FAMILY MEDICINE

## 2020-01-27 PROCEDURE — 1160F RVW MEDS BY RX/DR IN RCRD: CPT | Performed by: FAMILY MEDICINE

## 2020-01-27 PROCEDURE — 1036F TOBACCO NON-USER: CPT | Performed by: FAMILY MEDICINE

## 2020-01-27 RX ORDER — FUROSEMIDE 20 MG/1
20 TABLET ORAL DAILY
Qty: 90 TABLET | Refills: 3 | Status: SHIPPED | OUTPATIENT
Start: 2020-01-27 | End: 2021-01-11 | Stop reason: SDUPTHER

## 2020-01-27 RX ORDER — ROSUVASTATIN CALCIUM 5 MG/1
5 TABLET, COATED ORAL DAILY
Qty: 90 TABLET | Refills: 3 | Status: SHIPPED | OUTPATIENT
Start: 2020-01-27 | End: 2021-01-11 | Stop reason: SDUPTHER

## 2020-01-27 RX ORDER — FUROSEMIDE 20 MG/1
20 TABLET ORAL DAILY
Qty: 90 TABLET | Refills: 3 | Status: SHIPPED | OUTPATIENT
Start: 2020-01-27 | End: 2020-01-27 | Stop reason: SDUPTHER

## 2020-01-27 NOTE — PROGRESS NOTES
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)       Other    Cavernoma    Hypercholesteremia    Relevant Medications    rosuvastatin (CRESTOR) 5 mg tablet    Other Relevant Orders    CBC and differential    Comprehensive metabolic panel    Thrombocytopenia (HCC)    Relevant Orders    CBC and differential    Comprehensive metabolic panel      Other Visit Diagnoses     Obesity, Class I, BMI 30-34 9            BMI Counseling: Body mass index is 30 22 kg/m²  The BMI is above normal  Nutrition recommendations include encouraging healthy choices of fruits and vegetables  Exercise recommendations include moderate physical activity 150 minutes/week  Subjective:      Patient ID: José Antonio Norwood is a 81 y o  male  HPI patient presents today for follow-up for chronic health issues  Overall, he feels he is doing well  He has a history of hypertension and denies problems chest pain, shortness of breath, palpitations or lightheadedness  He does have occasional mild swelling in his lower extremities but has noted no orthopnea  He does remain on furosemide and has some subsequent mild polyuria  He has a history of hyperlipidemia and tolerates statin medication without significant myalgias  He has remote history of DVT but has had no calf pain  This occurred in the context of a significant fall  He remains physically active without any significant cardiovascular limitations  He has history of a seizure disorder as well as a cavernoma on MRI of his brain  He does follow annually with Neurology  Fortunately, he has had no significant headaches    He has had no seizures since 2006  He has a history of thrombocytopenia which is mild he denies any bruising or bleeding  The following portions of the patient's history were reviewed and updated as appropriate: allergies, current medications, past family history, past medical history, past social history, past surgical history and problem list       Current Outpatient Medications:     aspirin 81 MG tablet, Take 81 mg by mouth, Disp: , Rfl:     DAILY MULTIPLE VITAMINS tablet, Take 1 tablet by mouth daily, Disp: , Rfl:     furosemide (LASIX) 20 mg tablet, Take 1 tablet (20 mg total) by mouth daily, Disp: 90 tablet, Rfl: 3    levETIRAcetam (KEPPRA) 1000 MG tablet, Take 1 tablet by mouth 2 (two) times a day, Disp: , Rfl:     rosuvastatin (CRESTOR) 5 mg tablet, Take 1 tablet (5 mg total) by mouth daily, Disp: 90 tablet, Rfl: 3     Review of Systems   Constitutional: Negative for appetite change, chills, fatigue, fever and unexpected weight change  HENT: Negative for trouble swallowing  Eyes: Negative for visual disturbance  Respiratory: Negative for cough, chest tightness, shortness of breath and wheezing  Cardiovascular: Positive for leg swelling (on occasion - minimial)  Negative for chest pain and palpitations  Gastrointestinal: Negative for abdominal distention, abdominal pain, blood in stool, constipation and diarrhea  Endocrine: Negative for polyuria  Genitourinary: Negative for difficulty urinating and flank pain  Musculoskeletal: Negative for arthralgias, gait problem and myalgias  Skin: Negative for rash  Neurological: Negative for dizziness and light-headedness  Hematological: Negative for adenopathy  Does not bruise/bleed easily  Psychiatric/Behavioral: Negative for sleep disturbance           Objective:      /82 (BP Location: Left arm, Patient Position: Sitting, Cuff Size: Standard)   Pulse 67   Temp 97 9 °F (36 6 °C) (Tympanic)   Ht 5' 10 5" (1 791 m)   Wt 96 9 kg (213 lb 9 6 oz)   SpO2 97%   BMI 30 22 kg/m²          Physical Exam   Constitutional: He is oriented to person, place, and time  He appears well-developed and well-nourished  No distress  HENT:   Head: Normocephalic and atraumatic  Right Ear: External ear normal    Left Ear: External ear normal    Mouth/Throat: Oropharynx is clear and moist  No oropharyngeal exudate  Eyes: Pupils are equal, round, and reactive to light  EOM are normal  Right eye exhibits no discharge  Left eye exhibits no discharge  No scleral icterus  Neck: Normal carotid pulses present  Carotid bruit is not present  No tracheal deviation, no edema and no erythema present  No thyromegaly present  Cardiovascular: Normal rate, regular rhythm and normal heart sounds  Exam reveals no gallop  No murmur heard  Pulmonary/Chest: Effort normal  No stridor  No tachypnea  No respiratory distress  He has no wheezes  He has no rales  Abdominal: Soft  Bowel sounds are normal  He exhibits no distension and no mass  There is no hepatosplenomegaly  There is no tenderness  There is no rebound, no guarding and no CVA tenderness  Musculoskeletal: Normal range of motion  He exhibits edema (Trace bilateral lower extremity)  He exhibits no tenderness or deformity  Lymphadenopathy:     He has no cervical adenopathy  Neurological: He is alert and oriented to person, place, and time  He displays normal reflexes  No cranial nerve deficit  He exhibits normal muscle tone  Coordination normal    Skin: Skin is warm  No rash noted  He is not diaphoretic  No erythema  No pallor  Psychiatric: His speech is normal and behavior is normal  Judgment and thought content normal  His mood appears not anxious  Cognition and memory are normal  He does not exhibit a depressed mood           Yashira Ramey MD

## 2020-05-14 ENCOUNTER — OFFICE VISIT (OUTPATIENT)
Dept: SLEEP CENTER | Facility: CLINIC | Age: 85
End: 2020-05-14
Payer: COMMERCIAL

## 2020-05-14 VITALS
HEART RATE: 65 BPM | SYSTOLIC BLOOD PRESSURE: 122 MMHG | DIASTOLIC BLOOD PRESSURE: 68 MMHG | BODY MASS INDEX: 30.07 KG/M2 | HEIGHT: 71 IN | WEIGHT: 214.8 LBS

## 2020-05-14 DIAGNOSIS — G47.33 OSA (OBSTRUCTIVE SLEEP APNEA): Primary | ICD-10-CM

## 2020-05-14 PROCEDURE — 4040F PNEUMOC VAC/ADMIN/RCVD: CPT | Performed by: PSYCHIATRY & NEUROLOGY

## 2020-05-14 PROCEDURE — 99214 OFFICE O/P EST MOD 30 MIN: CPT | Performed by: PSYCHIATRY & NEUROLOGY

## 2020-05-14 PROCEDURE — 1036F TOBACCO NON-USER: CPT | Performed by: PSYCHIATRY & NEUROLOGY

## 2020-05-14 PROCEDURE — 3074F SYST BP LT 130 MM HG: CPT | Performed by: PSYCHIATRY & NEUROLOGY

## 2020-05-14 PROCEDURE — 1160F RVW MEDS BY RX/DR IN RCRD: CPT | Performed by: PSYCHIATRY & NEUROLOGY

## 2020-05-14 PROCEDURE — 3078F DIAST BP <80 MM HG: CPT | Performed by: PSYCHIATRY & NEUROLOGY

## 2020-05-14 PROCEDURE — 3008F BODY MASS INDEX DOCD: CPT | Performed by: PSYCHIATRY & NEUROLOGY

## 2020-05-18 ENCOUNTER — TELEPHONE (OUTPATIENT)
Dept: SLEEP CENTER | Facility: CLINIC | Age: 85
End: 2020-05-18

## 2020-07-15 ENCOUNTER — APPOINTMENT (OUTPATIENT)
Dept: LAB | Facility: CLINIC | Age: 85
End: 2020-07-15
Payer: COMMERCIAL

## 2020-07-15 DIAGNOSIS — I10 ESSENTIAL HYPERTENSION: ICD-10-CM

## 2020-07-15 DIAGNOSIS — E78.00 HYPERCHOLESTEREMIA: ICD-10-CM

## 2020-07-15 DIAGNOSIS — D69.6 THROMBOCYTOPENIA (HCC): ICD-10-CM

## 2020-07-15 LAB
ALBUMIN SERPL BCP-MCNC: 3.6 G/DL (ref 3.5–5)
ALP SERPL-CCNC: 79 U/L (ref 46–116)
ALT SERPL W P-5'-P-CCNC: 32 U/L (ref 12–78)
ANION GAP SERPL CALCULATED.3IONS-SCNC: 4 MMOL/L (ref 4–13)
AST SERPL W P-5'-P-CCNC: 23 U/L (ref 5–45)
BASOPHILS # BLD AUTO: 0.03 THOUSANDS/ΜL (ref 0–0.1)
BASOPHILS NFR BLD AUTO: 1 % (ref 0–1)
BILIRUB SERPL-MCNC: 0.87 MG/DL (ref 0.2–1)
BUN SERPL-MCNC: 22 MG/DL (ref 5–25)
CALCIUM SERPL-MCNC: 9.4 MG/DL (ref 8.3–10.1)
CHLORIDE SERPL-SCNC: 108 MMOL/L (ref 100–108)
CO2 SERPL-SCNC: 28 MMOL/L (ref 21–32)
CREAT SERPL-MCNC: 1.01 MG/DL (ref 0.6–1.3)
EOSINOPHIL # BLD AUTO: 0.14 THOUSAND/ΜL (ref 0–0.61)
EOSINOPHIL NFR BLD AUTO: 3 % (ref 0–6)
ERYTHROCYTE [DISTWIDTH] IN BLOOD BY AUTOMATED COUNT: 13.2 % (ref 11.6–15.1)
GFR SERPL CREATININE-BSD FRML MDRD: 68 ML/MIN/1.73SQ M
GLUCOSE P FAST SERPL-MCNC: 92 MG/DL (ref 65–99)
HCT VFR BLD AUTO: 46.9 % (ref 36.5–49.3)
HGB BLD-MCNC: 15.3 G/DL (ref 12–17)
IMM GRANULOCYTES # BLD AUTO: 0.01 THOUSAND/UL (ref 0–0.2)
IMM GRANULOCYTES NFR BLD AUTO: 0 % (ref 0–2)
LYMPHOCYTES # BLD AUTO: 0.98 THOUSANDS/ΜL (ref 0.6–4.47)
LYMPHOCYTES NFR BLD AUTO: 22 % (ref 14–44)
MCH RBC QN AUTO: 33.7 PG (ref 26.8–34.3)
MCHC RBC AUTO-ENTMCNC: 32.6 G/DL (ref 31.4–37.4)
MCV RBC AUTO: 103 FL (ref 82–98)
MONOCYTES # BLD AUTO: 0.4 THOUSAND/ΜL (ref 0.17–1.22)
MONOCYTES NFR BLD AUTO: 9 % (ref 4–12)
NEUTROPHILS # BLD AUTO: 2.85 THOUSANDS/ΜL (ref 1.85–7.62)
NEUTS SEG NFR BLD AUTO: 65 % (ref 43–75)
NRBC BLD AUTO-RTO: 0 /100 WBCS
PLATELET # BLD AUTO: 92 THOUSANDS/UL (ref 149–390)
PMV BLD AUTO: 10.5 FL (ref 8.9–12.7)
POTASSIUM SERPL-SCNC: 4.5 MMOL/L (ref 3.5–5.3)
PROT SERPL-MCNC: 6.5 G/DL (ref 6.4–8.2)
RBC # BLD AUTO: 4.54 MILLION/UL (ref 3.88–5.62)
SODIUM SERPL-SCNC: 140 MMOL/L (ref 136–145)
WBC # BLD AUTO: 4.41 THOUSAND/UL (ref 4.31–10.16)

## 2020-07-15 PROCEDURE — 80053 COMPREHEN METABOLIC PANEL: CPT

## 2020-07-15 PROCEDURE — 85025 COMPLETE CBC W/AUTO DIFF WBC: CPT

## 2020-07-15 PROCEDURE — 36415 COLL VENOUS BLD VENIPUNCTURE: CPT

## 2020-07-27 ENCOUNTER — OFFICE VISIT (OUTPATIENT)
Dept: FAMILY MEDICINE CLINIC | Facility: CLINIC | Age: 85
End: 2020-07-27
Payer: COMMERCIAL

## 2020-07-27 VITALS
RESPIRATION RATE: 18 BRPM | WEIGHT: 212 LBS | SYSTOLIC BLOOD PRESSURE: 138 MMHG | TEMPERATURE: 97.6 F | HEIGHT: 71 IN | OXYGEN SATURATION: 95 % | DIASTOLIC BLOOD PRESSURE: 88 MMHG | BODY MASS INDEX: 29.68 KG/M2 | HEART RATE: 88 BPM

## 2020-07-27 DIAGNOSIS — Z00.00 MEDICARE ANNUAL WELLNESS VISIT, SUBSEQUENT: Primary | ICD-10-CM

## 2020-07-27 DIAGNOSIS — Z87.442 HISTORY OF RENAL STONE: ICD-10-CM

## 2020-07-27 DIAGNOSIS — G40.209 LOCALIZATION-RELATED FOCAL EPILEPSY WITH COMPLEX PARTIAL SEIZURES (HCC): ICD-10-CM

## 2020-07-27 DIAGNOSIS — E78.00 HYPERCHOLESTEREMIA: ICD-10-CM

## 2020-07-27 DIAGNOSIS — G47.33 OBSTRUCTIVE SLEEP APNEA SYNDROME: ICD-10-CM

## 2020-07-27 DIAGNOSIS — I10 ESSENTIAL HYPERTENSION: ICD-10-CM

## 2020-07-27 DIAGNOSIS — D69.6 THROMBOCYTOPENIA (HCC): ICD-10-CM

## 2020-07-27 DIAGNOSIS — E66.3 OVERWEIGHT WITH BODY MASS INDEX (BMI) OF 29 TO 29.9 IN ADULT: ICD-10-CM

## 2020-07-27 DIAGNOSIS — D18.00 CAVERNOMA: ICD-10-CM

## 2020-07-27 PROCEDURE — 1160F RVW MEDS BY RX/DR IN RCRD: CPT | Performed by: FAMILY MEDICINE

## 2020-07-27 PROCEDURE — 3079F DIAST BP 80-89 MM HG: CPT | Performed by: FAMILY MEDICINE

## 2020-07-27 PROCEDURE — 1036F TOBACCO NON-USER: CPT | Performed by: FAMILY MEDICINE

## 2020-07-27 PROCEDURE — 4040F PNEUMOC VAC/ADMIN/RCVD: CPT | Performed by: FAMILY MEDICINE

## 2020-07-27 PROCEDURE — 1125F AMNT PAIN NOTED PAIN PRSNT: CPT | Performed by: FAMILY MEDICINE

## 2020-07-27 PROCEDURE — 3008F BODY MASS INDEX DOCD: CPT | Performed by: FAMILY MEDICINE

## 2020-07-27 PROCEDURE — 1170F FXNL STATUS ASSESSED: CPT | Performed by: FAMILY MEDICINE

## 2020-07-27 PROCEDURE — 3075F SYST BP GE 130 - 139MM HG: CPT | Performed by: FAMILY MEDICINE

## 2020-07-27 PROCEDURE — G0439 PPPS, SUBSEQ VISIT: HCPCS | Performed by: FAMILY MEDICINE

## 2020-07-27 PROCEDURE — 99214 OFFICE O/P EST MOD 30 MIN: CPT | Performed by: FAMILY MEDICINE

## 2020-07-27 NOTE — PROGRESS NOTES
Assessment and Plan:     Problem List Items Addressed This Visit     None      Visit Diagnoses     Medicare annual wellness visit, subsequent    -  Primary    Overweight with body mass index (BMI) of 29 to 29 9 in adult            BMI Counseling: Body mass index is 29 57 kg/m²  The BMI is above normal  Nutrition recommendations include encouraging healthy choices of fruits and vegetables  Exercise recommendations include moderate physical activity 150 minutes/week  Presents today for Medicare wellness visit  Overall, he is quite stable  He screens negative for depression or cognitive decline  He is up-to-date on all vaccines  He no longer requires colon or prostate cancer screening  He remains very active without cardiovascular limitations  He completes all of his own ADLs  Preventive health issues were discussed with patient, and age appropriate screening tests were ordered as noted in patient's After Visit Summary  Personalized health advice and appropriate referrals for health education or preventive services given if needed, as noted in patient's After Visit Summary       History of Present Illness:     Patient presents for Medicare Annual Wellness visit    Patient Care Team:  Adrien Magana MD as PCP - General     Problem List:     Patient Active Problem List   Diagnosis    Sleep apnea    Class 1 obesity due to excess calories without serious comorbidity with body mass index (BMI) of 30 0 to 30 9 in adult    History of renal stone    Cavernoma    [facility]ulsions[facility]ptic [facility] [address] [facility]    Hypercholesteremia    Localization-related focal epilepsy with complex partial seizures ([facility] [address] )    Essential hypertension    Thrombocytopenia ([facility] [address] )      Past Medical and Surgical History:     Past Medical History:   Diagnosis Date    DVT (deep venous thrombosis) ([facility] [address] )     Occurred after fall with fracture patella     Past Surgical History:   Procedure Laterality Date    BLEPHAROPLASTY      last assessed: 2017; upper lid w/ excessive skin     COLONOSCOPY W/ POLYPECTOMY      MOUTH SURGERY      gingival incision due to Dilantin therapy       Family History:     Family History   Problem Relation Age of Onset    Stroke Family         syndrome      Social History:        Social History     Socioeconomic History    Marital status: /Civil Union     Spouse name: None    Number of children: None    Years of education: None    Highest education level: None   Occupational History    None   Social Needs    Financial resource strain: None    Food insecurity:     Worry: None     Inability: None    Transportation needs:     Medical: None     Non-medical: None   Tobacco Use    Smoking status: Former Smoker     Packs/day: 1      Years: 10      Pack years: 10      Types: Cigarettes     Last attempt to quit:      Years since quittin 6    Smokeless tobacco: Never Used    Tobacco comment: Quit in the    Substance and Sexual Activity    Alcohol use:  Yes     Alcohol/week: 5 0 standard drinks     Types: 5 Standard drinks or equivalent per week     Frequency: 4 or more times a week     Comment: Social     Drug use: No    Sexual activity: Not Currently   Lifestyle    Physical activity:     Days per week: None     Minutes per session: None    Stress: None   Relationships    Social connections:     Talks on phone: None     Gets together: None     Attends Sabianist service: None     Active member of club or organization: None     Attends meetings of clubs or organizations: None     Relationship status: None    Intimate partner violence:     Fear of current or ex partner: None     Emotionally abused: None     Physically abused: None     Forced sexual activity: None   Other Topics Concern    None   Social History Narrative    None      Medications and Allergies:     Current Outpatient Medications   Medication Sig Dispense Refill    aspirin 81 MG tablet Take 81 mg by mouth every other day  DAILY MULTIPLE VITAMINS tablet Take 1 tablet by mouth daily      furosemide (LASIX) 20 mg tablet Take 1 tablet (20 mg total) by mouth daily 90 tablet 3    levETIRAcetam (KEPPRA) 1000 MG tablet Take 1 tablet by mouth 2 (two) times a day      rosuvastatin (CRESTOR) 5 mg tablet Take 1 tablet (5 mg total) by mouth daily 90 tablet 3     No current facility-administered medications for this visit  No Known Allergies   Immunizations:     Immunization History   Administered Date(s) Administered    INFLUENZA 10/10/2008, 10/18/2014, 10/10/2015, 09/15/2016, 10/02/2017    Influenza Split High Dose Preservative Free IM 10/18/2012, 10/04/2013, 10/18/2014, 10/10/2015, 09/15/2016, 10/02/2017    Influenza TIV (IM) 10/10/2011    Influenza, high dose seasonal 0 5 mL 10/03/2018, 10/10/2019    Pneumococcal Conjugate 13-Valent 03/25/2015    Pneumococcal Polysaccharide PPV23 10/18/2012    Td (adult), adsorbed 07/01/2004    Tdap 10/03/2016    Zoster 02/25/2010    Zoster Vaccine Recombinant 03/18/2019, 06/12/2019      Health Maintenance: There are no preventive care reminders to display for this patient  Topic Date Due    Influenza Vaccine  07/01/2020      Medicare Health Risk Assessment:     /88   Pulse 88   Temp 97 6 °F (36 4 °C)   Resp 18   Ht 5' 11" (1 803 m)   Wt 96 2 kg (212 lb)   SpO2 95%   BMI 29 57 kg/m²      Manoj is here for his Subsequent Wellness visit  Health Risk Assessment:   Patient rates overall health as good  Patient feels that their physical health rating is same  Eyesight was rated as same  Hearing was rated as same  Patient feels that their emotional and mental health rating is same  Pain experienced in the last 7 days has been some  Patient's pain rating has been 3/10  Patient states that he has experienced no weight loss or gain in last 6 months  Depression Screening:   PHQ-2 Score: 0      Fall Risk Screening:    In the past year, patient has experienced: no history of falling in past year      Home Safety:  Patient does not have trouble with stairs inside or outside of their home  Patient has working smoke alarms and has no working carbon monoxide detector  Home safety hazards include: none  Nutrition:   Current diet is Regular and Limited junk food  Medications:   Patient is currently taking over-the-counter supplements  OTC medications include: see medication list  Patient is able to manage medications  Activities of Daily Living (ADLs)/Instrumental Activities of Daily Living (IADLs):   Walk and transfer into and out of bed and chair?: Yes  Dress and groom yourself?: Yes    Bathe or shower yourself?: Yes    Feed yourself? Yes  Do your laundry/housekeeping?: Yes  Manage your money, pay your bills and track your expenses?: Yes  Make your own meals?: Yes    Do your own shopping?: Yes    Previous Hospitalizations:   Any hospitalizations or ED visits within the last 12 months?: No      Advance Care Planning:   Living will: Yes    Durable POA for healthcare:  Yes    Advanced directive: Yes      PREVENTIVE SCREENINGS      Cardiovascular Screening:    General: Screening Not Indicated and History Lipid Disorder      Diabetes Screening:     General: Screening Current      Prostate Cancer Screening:    General: Screening Not Indicated      Abdominal Aortic Aneurysm (AAA) Screening:    Risk factors include: tobacco use        Lung Cancer Screening:     General: Screening Not Indicated      Jay Angelo MD

## 2020-07-27 NOTE — PROGRESS NOTES
Assessment/Plan:       Problem List Items Addressed This Visit        Respiratory    Sleep apnea    Relevant Orders    CBC and differential       Cardiovascular and Mediastinum    Essential hypertension    Relevant Orders    Comprehensive metabolic panel    Lipid Panel with Direct LDL reflex       Nervous and Auditory    Localization-related focal epilepsy with complex partial seizures ([address] )       Other    History of renal stone    Cavernoma     Continue routine neurology follow-up         Hypercholesteremia    Relevant Orders    Comprehensive metabolic panel    Lipid Panel with Direct LDL reflex    Thrombocytopenia (HCC)    Relevant Orders    CBC and differential      Other Visit Diagnoses     Medicare annual wellness visit, subsequent    -  Primary    Overweight with body mass index (BMI) of 29 to 29 9 in adult                Subjective:      Patient ID: Francis Knutson is a 81 y o  male  HPI patient presents today for follow-up for his chronic health issues  He has remained quite active  He is golfing routinely without cardiovascular limitations  He has a history of hypertension and remains on furosemide which we started due to some chronic lower extremity swelling  He denies any chest pain, shortness of breath, palpitations or lightheadedness  He has a history of epilepsy and follows routinely with Neurology  Fortunately has not had a seizure in many years  He has an abnormality in the right frontal lobe consistent with a cavernoma which has remained stable  He has a history of thrombocytopenia but no bleeding  He has hyperlipidemia which remains stable on statin therapy without myalgias      The following portions of the patient's history were reviewed and updated as appropriate: allergies, current medications, past family history, past medical history, past social history, past surgical history and problem list       Current Outpatient Medications:     aspirin 81 MG tablet, Take 81 mg by mouth every other day , Disp: , Rfl:     DAILY MULTIPLE VITAMINS tablet, Take 1 tablet by mouth daily, Disp: , Rfl:     furosemide (LASIX) 20 mg tablet, Take 1 tablet (20 mg total) by mouth daily, Disp: 90 tablet, Rfl: 3    levETIRAcetam (KEPPRA) 1000 MG tablet, Take 1 tablet by mouth 2 (two) times a day, Disp: , Rfl:     rosuvastatin (CRESTOR) 5 mg tablet, Take 1 tablet (5 mg total) by mouth daily, Disp: 90 tablet, Rfl: 3     Review of Systems   Constitutional: Negative for appetite change, chills, fatigue, fever and unexpected weight change  HENT: Negative for trouble swallowing  Eyes: Negative for visual disturbance  Respiratory: Negative for cough, chest tightness, shortness of breath and wheezing  Cardiovascular: Positive for leg swelling  Negative for chest pain and palpitations  Gastrointestinal: Negative for abdominal distention, abdominal pain, blood in stool, constipation and diarrhea  Endocrine: Negative for polyuria  Genitourinary: Negative for difficulty urinating and flank pain  Musculoskeletal: Negative for arthralgias, back pain, joint swelling and myalgias  Skin: Negative for rash  Neurological: Negative for dizziness and light-headedness  Hematological: Negative for adenopathy  Does not bruise/bleed easily  Psychiatric/Behavioral: Negative for sleep disturbance  Objective:      /88   Pulse 88   Temp 97 6 °F (36 4 °C)   Resp 18   Ht 5' 11" (1 803 m)   Wt 96 2 kg (212 lb)   SpO2 95%   BMI 29 57 kg/m²          Physical Exam   Constitutional: He is oriented to person, place, and time  He appears well-developed and well-nourished  No distress  HENT:   Head: Normocephalic  Eyes: Pupils are equal, round, and reactive to light  Right eye exhibits no discharge  Left eye exhibits no discharge  Neck: No tracheal deviation present  No thyromegaly present  Cardiovascular: Normal rate, regular rhythm and normal heart sounds  No murmur heard    Pulmonary/Chest: Effort normal  No respiratory distress  He has no wheezes  He has no rales  Abdominal: Soft  Bowel sounds are normal  He exhibits no distension  There is no tenderness  Musculoskeletal: Normal range of motion  He exhibits edema (Trace edema bilateral lower extremities  )  Lymphadenopathy:     He has no cervical adenopathy  Neurological: He is alert and oriented to person, place, and time  No cranial nerve deficit  Skin: Skin is warm  He is not diaphoretic  No erythema  Psychiatric: He has a normal mood and affect   Judgment and thought content normal          Brandon Valenzuela MD

## 2020-10-15 ENCOUNTER — IMMUNIZATIONS (OUTPATIENT)
Dept: FAMILY MEDICINE CLINIC | Facility: CLINIC | Age: 85
End: 2020-10-15
Payer: COMMERCIAL

## 2020-10-15 DIAGNOSIS — Z23 FLU VACCINE NEED: Primary | ICD-10-CM

## 2020-10-15 PROCEDURE — G0008 ADMIN INFLUENZA VIRUS VAC: HCPCS

## 2020-10-15 PROCEDURE — 90662 IIV NO PRSV INCREASED AG IM: CPT

## 2021-01-04 ENCOUNTER — APPOINTMENT (OUTPATIENT)
Dept: LAB | Facility: CLINIC | Age: 86
End: 2021-01-04
Payer: COMMERCIAL

## 2021-01-04 DIAGNOSIS — G47.33 OBSTRUCTIVE SLEEP APNEA SYNDROME: ICD-10-CM

## 2021-01-04 DIAGNOSIS — I10 ESSENTIAL HYPERTENSION: ICD-10-CM

## 2021-01-04 DIAGNOSIS — E78.00 HYPERCHOLESTEREMIA: ICD-10-CM

## 2021-01-04 DIAGNOSIS — D69.6 THROMBOCYTOPENIA (HCC): ICD-10-CM

## 2021-01-04 LAB
ALBUMIN SERPL BCP-MCNC: 3.6 G/DL (ref 3.5–5)
ALP SERPL-CCNC: 88 U/L (ref 46–116)
ALT SERPL W P-5'-P-CCNC: 37 U/L (ref 12–78)
ANION GAP SERPL CALCULATED.3IONS-SCNC: 3 MMOL/L (ref 4–13)
AST SERPL W P-5'-P-CCNC: 29 U/L (ref 5–45)
BASOPHILS # BLD AUTO: 0.04 THOUSANDS/ΜL (ref 0–0.1)
BASOPHILS NFR BLD AUTO: 1 % (ref 0–1)
BILIRUB SERPL-MCNC: 0.78 MG/DL (ref 0.2–1)
BUN SERPL-MCNC: 22 MG/DL (ref 5–25)
CALCIUM SERPL-MCNC: 9.3 MG/DL (ref 8.3–10.1)
CHLORIDE SERPL-SCNC: 108 MMOL/L (ref 100–108)
CHOLEST SERPL-MCNC: 166 MG/DL (ref 50–200)
CO2 SERPL-SCNC: 29 MMOL/L (ref 21–32)
CREAT SERPL-MCNC: 1 MG/DL (ref 0.6–1.3)
EOSINOPHIL # BLD AUTO: 0.17 THOUSAND/ΜL (ref 0–0.61)
EOSINOPHIL NFR BLD AUTO: 3 % (ref 0–6)
ERYTHROCYTE [DISTWIDTH] IN BLOOD BY AUTOMATED COUNT: 13 % (ref 11.6–15.1)
GFR SERPL CREATININE-BSD FRML MDRD: 69 ML/MIN/1.73SQ M
GLUCOSE P FAST SERPL-MCNC: 93 MG/DL (ref 65–99)
HCT VFR BLD AUTO: 45.3 % (ref 36.5–49.3)
HDLC SERPL-MCNC: 60 MG/DL
HGB BLD-MCNC: 15.6 G/DL (ref 12–17)
IMM GRANULOCYTES # BLD AUTO: 0.01 THOUSAND/UL (ref 0–0.2)
IMM GRANULOCYTES NFR BLD AUTO: 0 % (ref 0–2)
LDLC SERPL CALC-MCNC: 88 MG/DL (ref 0–100)
LYMPHOCYTES # BLD AUTO: 1.2 THOUSANDS/ΜL (ref 0.6–4.47)
LYMPHOCYTES NFR BLD AUTO: 22 % (ref 14–44)
MCH RBC QN AUTO: 33.6 PG (ref 26.8–34.3)
MCHC RBC AUTO-ENTMCNC: 34.4 G/DL (ref 31.4–37.4)
MCV RBC AUTO: 98 FL (ref 82–98)
MONOCYTES # BLD AUTO: 0.5 THOUSAND/ΜL (ref 0.17–1.22)
MONOCYTES NFR BLD AUTO: 9 % (ref 4–12)
NEUTROPHILS # BLD AUTO: 3.48 THOUSANDS/ΜL (ref 1.85–7.62)
NEUTS SEG NFR BLD AUTO: 65 % (ref 43–75)
NRBC BLD AUTO-RTO: 0 /100 WBCS
PLATELET # BLD AUTO: 104 THOUSANDS/UL (ref 149–390)
PMV BLD AUTO: 10 FL (ref 8.9–12.7)
POTASSIUM SERPL-SCNC: 4.1 MMOL/L (ref 3.5–5.3)
PROT SERPL-MCNC: 6.6 G/DL (ref 6.4–8.2)
RBC # BLD AUTO: 4.64 MILLION/UL (ref 3.88–5.62)
SODIUM SERPL-SCNC: 140 MMOL/L (ref 136–145)
TRIGL SERPL-MCNC: 89 MG/DL
WBC # BLD AUTO: 5.4 THOUSAND/UL (ref 4.31–10.16)

## 2021-01-04 PROCEDURE — 80053 COMPREHEN METABOLIC PANEL: CPT

## 2021-01-04 PROCEDURE — 85025 COMPLETE CBC W/AUTO DIFF WBC: CPT

## 2021-01-04 PROCEDURE — 80061 LIPID PANEL: CPT

## 2021-01-04 PROCEDURE — 36415 COLL VENOUS BLD VENIPUNCTURE: CPT

## 2021-01-11 ENCOUNTER — OFFICE VISIT (OUTPATIENT)
Dept: FAMILY MEDICINE CLINIC | Facility: CLINIC | Age: 86
End: 2021-01-11
Payer: COMMERCIAL

## 2021-01-11 VITALS
DIASTOLIC BLOOD PRESSURE: 72 MMHG | OXYGEN SATURATION: 94 % | HEIGHT: 71 IN | BODY MASS INDEX: 30.1 KG/M2 | WEIGHT: 215 LBS | TEMPERATURE: 97.1 F | SYSTOLIC BLOOD PRESSURE: 130 MMHG | HEART RATE: 72 BPM | RESPIRATION RATE: 18 BRPM

## 2021-01-11 DIAGNOSIS — E78.00 HYPERCHOLESTEREMIA: ICD-10-CM

## 2021-01-11 DIAGNOSIS — I10 ESSENTIAL HYPERTENSION: Primary | ICD-10-CM

## 2021-01-11 DIAGNOSIS — D69.6 THROMBOCYTOPENIA (HCC): ICD-10-CM

## 2021-01-11 DIAGNOSIS — G40.209 LOCALIZATION-RELATED FOCAL EPILEPSY WITH COMPLEX PARTIAL SEIZURES (HCC): ICD-10-CM

## 2021-01-11 PROCEDURE — 3075F SYST BP GE 130 - 139MM HG: CPT | Performed by: FAMILY MEDICINE

## 2021-01-11 PROCEDURE — 99214 OFFICE O/P EST MOD 30 MIN: CPT | Performed by: FAMILY MEDICINE

## 2021-01-11 PROCEDURE — 1036F TOBACCO NON-USER: CPT | Performed by: FAMILY MEDICINE

## 2021-01-11 PROCEDURE — 3078F DIAST BP <80 MM HG: CPT | Performed by: FAMILY MEDICINE

## 2021-01-11 PROCEDURE — 1160F RVW MEDS BY RX/DR IN RCRD: CPT | Performed by: FAMILY MEDICINE

## 2021-01-11 RX ORDER — FUROSEMIDE 20 MG/1
20 TABLET ORAL DAILY
Qty: 30 TABLET | Refills: 11 | Status: SHIPPED | OUTPATIENT
Start: 2021-01-11 | End: 2022-01-28 | Stop reason: SDUPTHER

## 2021-01-11 RX ORDER — ROSUVASTATIN CALCIUM 5 MG/1
5 TABLET, COATED ORAL DAILY
Qty: 30 TABLET | Refills: 11 | Status: SHIPPED | OUTPATIENT
Start: 2021-01-11 | End: 2022-01-28 | Stop reason: SDUPTHER

## 2021-01-11 NOTE — PROGRESS NOTES
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)    Relevant Orders    CBC and differential    Comprehensive metabolic panel       Other    Hypercholesteremia    Relevant Medications    rosuvastatin (CRESTOR) 5 mg tablet    Other Relevant Orders    Comprehensive metabolic panel    Thrombocytopenia (HCC)    Relevant Orders    CBC and differential    Comprehensive metabolic panel            Subjective:      Patient ID: Jyothi Luke is a 81 y o  male  HPI patient presents today for follow-up for chronic health issues  Overall, he feels well  He has history of hypertension and denies chest pain, shortness of breath, palpitations or lightheadedness  He is having no lower extremity swelling  He does get some urinary urgency when he takes Lasix so he skips it on occasion especially if he is going golfing  He has history of hyperlipidemia  He is tolerating statin therapy  Thankfully, he has had no recurrence of seizure activity  He follows with Neurology  He has history of thrombocytopenia but has had no bruising or bleeding      The following portions of the patient's history were reviewed and updated as appropriate: allergies, current medications, past family history, past medical history, past social history, past surgical history and problem list       Current Outpatient Medications:     aspirin 81 MG tablet, Take 81 mg by mouth every other day , Disp: , Rfl:     DAILY MULTIPLE VITAMINS tablet, Take 1 tablet by mouth daily, Disp: , Rfl:     furosemide (LASIX) 20 mg tablet, Take 1 tablet (20 mg total) by mouth daily, Disp: 30 tablet, Rfl: 11    levETIRAcetam (KEPPRA) 1000 MG tablet, Take 1 tablet by mouth 2 (two) times a day, Disp: , Rfl:     rosuvastatin (CRESTOR) 5 mg tablet, Take 1 tablet (5 mg total) by mouth daily, Disp: 30 tablet, Rfl: 11     Review of Systems   Constitutional: Negative for appetite change, chills, fatigue, fever and unexpected weight change  HENT: Negative for trouble swallowing  Eyes: Negative for visual disturbance  Respiratory: Negative for cough, chest tightness, shortness of breath and wheezing  Cardiovascular: Negative for chest pain, palpitations and leg swelling  Gastrointestinal: Negative for abdominal distention, abdominal pain, blood in stool, constipation and diarrhea  Endocrine: Negative for polyuria  Genitourinary: Negative for difficulty urinating and flank pain  Musculoskeletal: Negative for arthralgias and myalgias  Skin: Negative for rash  Neurological: Negative for dizziness and light-headedness  Hematological: Negative for adenopathy  Does not bruise/bleed easily  Psychiatric/Behavioral: Negative for dysphoric mood and sleep disturbance  The patient is not nervous/anxious  Objective:      /72 (BP Location: Left arm, Patient Position: Sitting, Cuff Size: Large)   Pulse 72   Temp (!) 97 1 °F (36 2 °C) (Temporal)   Resp 18   Ht 5' 11" (1 803 m)   Wt 97 5 kg (215 lb)   SpO2 94%   BMI 29 99 kg/m²          Physical Exam  Constitutional:       General: He is not in acute distress  Appearance: He is well-developed  He is not diaphoretic  HENT:      Head: Normocephalic  Eyes:      General:         Right eye: No discharge  Left eye: No discharge  Pupils: Pupils are equal, round, and reactive to light  Neck:      Thyroid: No thyromegaly  Trachea: No tracheal deviation  Cardiovascular:      Rate and Rhythm: Normal rate and regular rhythm  Heart sounds: Normal heart sounds  No murmur  Pulmonary:      Effort: Pulmonary effort is normal  No respiratory distress        Breath sounds: No wheezing or rales  Abdominal:      General: There is no distension  Palpations: Abdomen is soft  Tenderness: There is no abdominal tenderness  Musculoskeletal: Normal range of motion  Lymphadenopathy:      Cervical: No cervical adenopathy  Skin:     General: Skin is warm  Findings: No erythema  Neurological:      Mental Status: He is alert and oriented to person, place, and time  Cranial Nerves: No cranial nerve deficit  Psychiatric:         Thought Content:  Thought content normal          Judgment: Judgment normal            Shala Javed MD

## 2021-02-12 DIAGNOSIS — Z23 ENCOUNTER FOR IMMUNIZATION: ICD-10-CM

## 2021-05-20 ENCOUNTER — OFFICE VISIT (OUTPATIENT)
Dept: SLEEP CENTER | Facility: CLINIC | Age: 86
End: 2021-05-20
Payer: COMMERCIAL

## 2021-05-20 VITALS
HEIGHT: 71 IN | SYSTOLIC BLOOD PRESSURE: 120 MMHG | DIASTOLIC BLOOD PRESSURE: 68 MMHG | WEIGHT: 213.4 LBS | BODY MASS INDEX: 29.88 KG/M2 | HEART RATE: 64 BPM

## 2021-05-20 DIAGNOSIS — G47.33 OBSTRUCTIVE SLEEP APNEA TREATED WITH CONTINUOUS POSITIVE AIRWAY PRESSURE (CPAP): Primary | ICD-10-CM

## 2021-05-20 DIAGNOSIS — Z99.89 OBSTRUCTIVE SLEEP APNEA TREATED WITH CONTINUOUS POSITIVE AIRWAY PRESSURE (CPAP): Primary | ICD-10-CM

## 2021-05-20 PROCEDURE — 1160F RVW MEDS BY RX/DR IN RCRD: CPT | Performed by: NURSE PRACTITIONER

## 2021-05-20 PROCEDURE — 1036F TOBACCO NON-USER: CPT | Performed by: NURSE PRACTITIONER

## 2021-05-20 PROCEDURE — 3074F SYST BP LT 130 MM HG: CPT | Performed by: NURSE PRACTITIONER

## 2021-05-20 PROCEDURE — 3078F DIAST BP <80 MM HG: CPT | Performed by: NURSE PRACTITIONER

## 2021-05-20 PROCEDURE — 99214 OFFICE O/P EST MOD 30 MIN: CPT | Performed by: NURSE PRACTITIONER

## 2021-05-20 NOTE — PROGRESS NOTES
Progress Note - [facility] [address] 81 y o  male   :1935, MRN: [id number]  2021      Follow Up Evaluation / Problem:  Obstructive Sleep Apnea  Overweight  HTN  Hx of epilepsy - controlled      Thank you for the opportunity of participating in the evaluation and care of this patient in the Sleep Clinic at [facility]  HPI: Adilson Kothari is a 81y o  year old male  The patient presents for follow up of obstructive sleep apnea  He has been treated for SHON, using CPAP equipment since   A split night study was planned for re-qualification of equipment, however, events occurred later in the night, resulting in a diagnostic study only, with AHI of 19 2, worsening to 72 when supine with oxygen guru of 87%  A CPAP titration study was then completed with titration to 9cm  He has been successfully using the equipment since set up in early 2018  Review of Systems      Genitourinary none   Cardiology none   Gastrointestinal none   Neurology none   Constitutional none   Integumentary none   Psychiatry none   Musculoskeletal joint pain   Pulmonary none   ENT ringing in ears   Endocrine none   Hematological none       Current Outpatient Medications:     aspirin 81 MG tablet, Take 81 mg by mouth every other day , Disp: , Rfl:     DAILY MULTIPLE VITAMINS tablet, Take 1 tablet by mouth daily, Disp: , Rfl:     furosemide (LASIX) 20 mg tablet, Take 1 tablet (20 mg total) by mouth daily, Disp: 30 tablet, Rfl: 11    levETIRAcetam (KEPPRA) 1000 MG tablet, Take 1 tablet by mouth 2 (two) times a day, Disp: , Rfl:     rosuvastatin (CRESTOR) 5 mg tablet, Take 1 tablet (5 mg total) by mouth daily, Disp: 30 tablet, Rfl: 11    Randolph Sleepiness Scale  Sitting and reading: Slight chance of dozing  Watching TV: Would never doze  Sitting, inactive in a public place (e g  a theatre or a meeting):  Would never doze  As a passenger in a car for an hour without a break: Slight chance of dozing  Lying down to rest in the afternoon when circumstances permit: Would never doze  Sitting and talking to someone: Would never doze  Sitting quietly after a lunch without alcohol: Would never doze  In a car, while stopped for a few minutes in traffic: Would never doze  Total score: 2              Vitals:    05/20/21 1045   BP: 120/68   Pulse: 64   Weight: 96 8 kg (213 lb 6 4 oz)   Height: 5' 11" (1 803 m)       Body mass index is 29 76 kg/m²  EPWORTH SLEEPINESS SCORE  Total score: 2      Past History Since Last Sleep Center Visit:   He denies any changes to his health since his last visit  He reports that he uses the CPAP equipment every night and sleeps well when using it  He rarely wakes up during the night  He feels rested upon awakening  He denies daytime sleepiness  The patient reports that he cleans the equipment appropriately and changes supplies on a regular basis  The review of systems and following portions of the patient's history were reviewed and updated as appropriate: allergies, current medications, past family history, past medical history, past social history, past surgical history, and problem list       OBJECTIVE    PAP Pressure: Nasal CPAP set to deliver 9 cm of water pressure  Type of mask used: nasal  DME Provider: [facility]    Physical Exam:     General Appearance:   Alert, cooperative, no distress, appears stated age, overweight     Head:   Normocephalic, without obvious abnormality, atraumatic     Eyes:   PERRL, conjunctiva/corneas clear, EOM's intact          Nose:  Nares normal, septum midline, no drainage or sinus tenderness           Throat:  Lips, teeth and gums normal; tongue normal size and  shape and midline mucosa moist and redundant bilaterally, uvula small - normal, tonsils not visualized, Mallampati class 4       Neck:  Supple, symmetrical, trachea midline, no adenopathy;  Thyroid: No enlargement, tenderness or nodules; no carotid bruit or JVD     Lungs:      Clear to auscultation bilaterally, respirations unlabored     Heart:   Regular rate and rhythm, S1 and S2 normal, no murmur, rub or gallop       Extremities:  Extremities normal, atraumatic, no cyanosis or edema       Skin:  Skin color, texture, turgor normal, no rashes or lesions       Neurologic:  No focal deficits noted       ASSESSMENT / PLAN    1  Obstructive sleep apnea treated with continuous positive airway pressure (CPAP)  PAP DME Resupply/Reorder       Counseling / Coordination of Care  Total clinic time spent today 25 minutes  Greater than 50% of total time was spent with the patient and / or family counseling and / or coordination of care  A description of the counseling / coordination of care:     Impressions, Diagnostic results, Prognosis, Instructions for management, Risks and benefits of treatment, Patient and family education, Risk factor reductions and Importance of compliance with treatment    Today I reviewed the patient's compliance data  he has been able to use the equipment 100% of all days recorded  Average usage was 4 or more hours 100% of all days recorded  The estimated AHI is 2 1 abnormal breathing events per hour  The patient feels they benefit from the use of PAP equipment and would like to continue PAP therapy  Response to treatment has been very good  A prescription for supplies has been provided for the next year  He will continue using this equipment at the settings noted above for the next 12 months  At that timehe will then return for a routine follow-up evaluation  I have asked the patient to contact the [address] if he encounters any difficulties prior to that time  The following instructions have been given to the patient today:    Patient Instructions   1  Continue use of CPAP equipment nightly  2  Continue to clean your equipment, as discussed  3    Contact the [address] with any questions or concerns prior to your next visit, as needed  4  Schedule visit for follow-up in 1 year      Nursing Support:  When: Monday through Friday 7A-5PM except holidays  Where: Our direct line is [phone]  If you are having a true emergency please call 911  In the event that the line is busy or it is after hours please leave a voice message and we will return your call  Please speak clearly, leaving your full name, birth date, best number to reach you and the reason for your call  Medication refills: We will need the name of the medication, the dosage, the ordering provider, whether you get a 30 or 90 day refill, and the pharmacy name and address  Medications will be ordered by the provider only  Nurses cannot call in prescriptions  Please allow 7 days for medication refills  Physician requested updates: If your provider requested that you call with an update after starting medication, please be ready to provide us the medication and dosage, what time you take your medication, the time you attempt to fall asleep, time you fall asleep, when you wake up, and what time you get out of bed  Sleep Study Results: We will contact you with sleep study results and/or next steps after the physician has reviewed your testing          Gabi Headley, [facility]

## 2021-05-21 ENCOUNTER — TELEPHONE (OUTPATIENT)
Dept: SLEEP CENTER | Facility: CLINIC | Age: 86
End: 2021-05-21

## 2021-07-16 ENCOUNTER — APPOINTMENT (OUTPATIENT)
Dept: LAB | Facility: CLINIC | Age: 86
End: 2021-07-16
Payer: COMMERCIAL

## 2021-07-16 DIAGNOSIS — E78.00 HYPERCHOLESTEREMIA: ICD-10-CM

## 2021-07-16 DIAGNOSIS — G40.209 LOCALIZATION-RELATED FOCAL EPILEPSY WITH COMPLEX PARTIAL SEIZURES (HCC): ICD-10-CM

## 2021-07-16 DIAGNOSIS — I10 ESSENTIAL HYPERTENSION: ICD-10-CM

## 2021-07-16 DIAGNOSIS — D69.6 THROMBOCYTOPENIA (HCC): ICD-10-CM

## 2021-07-16 LAB
ALBUMIN SERPL BCP-MCNC: 3.4 G/DL (ref 3.5–5)
ALP SERPL-CCNC: 76 U/L (ref 46–116)
ALT SERPL W P-5'-P-CCNC: 26 U/L (ref 12–78)
ANION GAP SERPL CALCULATED.3IONS-SCNC: 2 MMOL/L (ref 4–13)
AST SERPL W P-5'-P-CCNC: 17 U/L (ref 5–45)
BASOPHILS # BLD AUTO: 0.02 THOUSANDS/ΜL (ref 0–0.1)
BASOPHILS NFR BLD AUTO: 1 % (ref 0–1)
BILIRUB SERPL-MCNC: 0.96 MG/DL (ref 0.2–1)
BUN SERPL-MCNC: 22 MG/DL (ref 5–25)
CALCIUM ALBUM COR SERPL-MCNC: 9.1 MG/DL (ref 8.3–10.1)
CALCIUM SERPL-MCNC: 8.6 MG/DL (ref 8.3–10.1)
CHLORIDE SERPL-SCNC: 109 MMOL/L (ref 100–108)
CO2 SERPL-SCNC: 27 MMOL/L (ref 21–32)
CREAT SERPL-MCNC: 1.13 MG/DL (ref 0.6–1.3)
EOSINOPHIL # BLD AUTO: 0.14 THOUSAND/ΜL (ref 0–0.61)
EOSINOPHIL NFR BLD AUTO: 3 % (ref 0–6)
ERYTHROCYTE [DISTWIDTH] IN BLOOD BY AUTOMATED COUNT: 13.2 % (ref 11.6–15.1)
GFR SERPL CREATININE-BSD FRML MDRD: 59 ML/MIN/1.73SQ M
GLUCOSE P FAST SERPL-MCNC: 89 MG/DL (ref 65–99)
HCT VFR BLD AUTO: 45.8 % (ref 36.5–49.3)
HGB BLD-MCNC: 15.2 G/DL (ref 12–17)
IMM GRANULOCYTES # BLD AUTO: 0.02 THOUSAND/UL (ref 0–0.2)
IMM GRANULOCYTES NFR BLD AUTO: 1 % (ref 0–2)
LYMPHOCYTES # BLD AUTO: 0.93 THOUSANDS/ΜL (ref 0.6–4.47)
LYMPHOCYTES NFR BLD AUTO: 21 % (ref 14–44)
MCH RBC QN AUTO: 33.3 PG (ref 26.8–34.3)
MCHC RBC AUTO-ENTMCNC: 33.2 G/DL (ref 31.4–37.4)
MCV RBC AUTO: 100 FL (ref 82–98)
MONOCYTES # BLD AUTO: 0.42 THOUSAND/ΜL (ref 0.17–1.22)
MONOCYTES NFR BLD AUTO: 10 % (ref 4–12)
NEUTROPHILS # BLD AUTO: 2.85 THOUSANDS/ΜL (ref 1.85–7.62)
NEUTS SEG NFR BLD AUTO: 64 % (ref 43–75)
NRBC BLD AUTO-RTO: 0 /100 WBCS
PLATELET # BLD AUTO: 105 THOUSANDS/UL (ref 149–390)
PMV BLD AUTO: 10.7 FL (ref 8.9–12.7)
POTASSIUM SERPL-SCNC: 4.5 MMOL/L (ref 3.5–5.3)
PROT SERPL-MCNC: 6.5 G/DL (ref 6.4–8.2)
RBC # BLD AUTO: 4.57 MILLION/UL (ref 3.88–5.62)
SODIUM SERPL-SCNC: 138 MMOL/L (ref 136–145)
WBC # BLD AUTO: 4.38 THOUSAND/UL (ref 4.31–10.16)

## 2021-07-16 PROCEDURE — 85025 COMPLETE CBC W/AUTO DIFF WBC: CPT

## 2021-07-16 PROCEDURE — 80053 COMPREHEN METABOLIC PANEL: CPT

## 2021-07-16 PROCEDURE — 36415 COLL VENOUS BLD VENIPUNCTURE: CPT

## 2021-07-26 ENCOUNTER — OFFICE VISIT (OUTPATIENT)
Dept: FAMILY MEDICINE CLINIC | Facility: CLINIC | Age: 86
End: 2021-07-26
Payer: COMMERCIAL

## 2021-07-26 VITALS
DIASTOLIC BLOOD PRESSURE: 70 MMHG | HEIGHT: 71 IN | SYSTOLIC BLOOD PRESSURE: 138 MMHG | BODY MASS INDEX: 29.96 KG/M2 | OXYGEN SATURATION: 95 % | TEMPERATURE: 97.5 F | HEART RATE: 60 BPM | WEIGHT: 214 LBS

## 2021-07-26 DIAGNOSIS — M46.1 SI (SACROILIAC) JOINT INFLAMMATION (HCC): ICD-10-CM

## 2021-07-26 DIAGNOSIS — E78.00 HYPERCHOLESTEREMIA: ICD-10-CM

## 2021-07-26 DIAGNOSIS — I10 ESSENTIAL HYPERTENSION: ICD-10-CM

## 2021-07-26 DIAGNOSIS — Z00.00 MEDICARE ANNUAL WELLNESS VISIT, SUBSEQUENT: Primary | ICD-10-CM

## 2021-07-26 DIAGNOSIS — G40.209 LOCALIZATION-RELATED FOCAL EPILEPSY WITH COMPLEX PARTIAL SEIZURES (HCC): ICD-10-CM

## 2021-07-26 DIAGNOSIS — G47.33 OBSTRUCTIVE SLEEP APNEA TREATED WITH CONTINUOUS POSITIVE AIRWAY PRESSURE (CPAP): ICD-10-CM

## 2021-07-26 DIAGNOSIS — Z99.89 OBSTRUCTIVE SLEEP APNEA TREATED WITH CONTINUOUS POSITIVE AIRWAY PRESSURE (CPAP): ICD-10-CM

## 2021-07-26 DIAGNOSIS — D69.6 THROMBOCYTOPENIA (HCC): ICD-10-CM

## 2021-07-26 PROCEDURE — 1170F FXNL STATUS ASSESSED: CPT | Performed by: FAMILY MEDICINE

## 2021-07-26 PROCEDURE — 3725F SCREEN DEPRESSION PERFORMED: CPT | Performed by: FAMILY MEDICINE

## 2021-07-26 PROCEDURE — 3078F DIAST BP <80 MM HG: CPT | Performed by: FAMILY MEDICINE

## 2021-07-26 PROCEDURE — 1160F RVW MEDS BY RX/DR IN RCRD: CPT | Performed by: FAMILY MEDICINE

## 2021-07-26 PROCEDURE — 1125F AMNT PAIN NOTED PAIN PRSNT: CPT | Performed by: FAMILY MEDICINE

## 2021-07-26 PROCEDURE — 1036F TOBACCO NON-USER: CPT | Performed by: FAMILY MEDICINE

## 2021-07-26 PROCEDURE — G0439 PPPS, SUBSEQ VISIT: HCPCS | Performed by: FAMILY MEDICINE

## 2021-07-26 PROCEDURE — 99214 OFFICE O/P EST MOD 30 MIN: CPT | Performed by: FAMILY MEDICINE

## 2021-07-26 PROCEDURE — 3075F SYST BP GE 130 - 139MM HG: CPT | Performed by: FAMILY MEDICINE

## 2021-07-26 NOTE — ASSESSMENT & PLAN NOTE
Pressure has been good at home  Continue his furosemide  He is having a slight increase in lower extremity swelling and certainly would benefit from utilizing compression stockings as opposed to increasing his furosemide at this time

## 2021-07-26 NOTE — ASSESSMENT & PLAN NOTE
He has some pain to palpation along his SI joint today as well as some paraspinal muscle tightness and I am going to send in for some physical therapy

## 2021-07-26 NOTE — PROGRESS NOTES
Assessment/Plan:       Problem List Items Addressed This Visit        Respiratory    Obstructive sleep apnea treated with continuous positive airway pressure (CPAP)     Continue on CPAP            Cardiovascular and Mediastinum    Essential hypertension     Pressure has been good at home  Continue his furosemide  He is having a slight increase in lower extremity swelling and certainly would benefit from utilizing compression stockings as opposed to increasing his furosemide at this time  Relevant Orders    CBC and differential    Comprehensive metabolic panel    Lipid Panel with Direct LDL reflex       Nervous and Auditory    Localization-related focal epilepsy with complex partial seizures ([address] )     Continue on Keppra  He remains clinically stable  He sees Neurology annually  Musculoskeletal and Integument    SI (sacroiliac) joint inflammation (HCC)     He has some pain to palpation along his SI joint today as well as some paraspinal muscle tightness and I am going to send in for some physical therapy         Relevant Orders    Ambulatory referral to Physical Therapy       Other    Hypercholesteremia    Relevant Orders    Comprehensive metabolic panel    Lipid Panel with Direct LDL reflex    Thrombocytopenia (HCC)    Relevant Orders    CBC and differential      Other Visit Diagnoses     Medicare annual wellness visit, subsequent    -  Primary            Subjective:      Patient ID: Bulmaro Lewis is a 81 y o  male  HPI   Patient presents today for follow-up chronic health issues  Overall, he feels pretty well  He does note some slight increase in lower extremity swelling which she has had for years  He has not been utilizing compression stockings  He has history of hyperlipidemia tolerates low-dose statin therapy without chest pain, shortness of breath or palpitations  He has a seizure disorder which has been quite stable for many years on Keppra    He does note a new issue of some pain in his right low back  He notes this came on about 2 weeks ago relatively abruptly  He denies any acute injury  He denies radicular pain down his legs or weakness  He is having no bowel or bladder issues  The following portions of the patient's history were reviewed and updated as appropriate: allergies, current medications, past family history, past medical history, past social history, past surgical history and problem list       Current Outpatient Medications:     aspirin 81 MG tablet, Take 81 mg by mouth every other day , Disp: , Rfl:     DAILY MULTIPLE VITAMINS tablet, Take 1 tablet by mouth daily, Disp: , Rfl:     furosemide (LASIX) 20 mg tablet, Take 1 tablet (20 mg total) by mouth daily, Disp: 30 tablet, Rfl: 11    levETIRAcetam (KEPPRA) 1000 MG tablet, Take 1 tablet by mouth 2 (two) times a day, Disp: , Rfl:     rosuvastatin (CRESTOR) 5 mg tablet, Take 1 tablet (5 mg total) by mouth daily, Disp: 30 tablet, Rfl: 11     Review of Systems   Constitutional: Negative for appetite change, chills, fatigue, fever and unexpected weight change  HENT: Negative for trouble swallowing  Eyes: Negative for visual disturbance  Respiratory: Negative for cough, chest tightness, shortness of breath and wheezing  Cardiovascular: Negative for chest pain, palpitations and leg swelling  Gastrointestinal: Negative for abdominal distention, abdominal pain, blood in stool, constipation and diarrhea  Endocrine: Negative for polyuria  Genitourinary: Negative for difficulty urinating and flank pain  Musculoskeletal: Positive for back pain  Negative for arthralgias, gait problem, joint swelling and myalgias  Skin: Negative for rash  Neurological: Negative for dizziness and light-headedness  Hematological: Negative for adenopathy  Does not bruise/bleed easily  Psychiatric/Behavioral: Negative for dysphoric mood and sleep disturbance  The patient is not nervous/anxious            Objective:      BP 138/70 (BP Location: Right arm, Patient Position: Sitting, Cuff Size: Standard)   Pulse 60   Temp 97 5 °F (36 4 °C)   Ht 5' 11" (1 803 m)   Wt 97 1 kg (214 lb)   SpO2 95%   BMI 29 85 kg/m²          Physical Exam  Vitals reviewed  Constitutional:       Appearance: He is well-developed  He is not toxic-appearing  HENT:      Head: Normocephalic  Cardiovascular:      Rate and Rhythm: Regular rhythm  Heart sounds: Normal heart sounds  No murmur heard  Pulmonary:      Effort: No respiratory distress  Breath sounds: No wheezing or rales  Abdominal:      General: There is no distension  Tenderness: There is no abdominal tenderness  Musculoskeletal:         General: Tenderness (He is tender at the proximal right SI joint as well as along the right lower paraspinal muscles ) present  No swelling  Right lower leg: Edema present  Left lower leg: Edema present  Skin:     Findings: No erythema or rash  Neurological:      Mental Status: He is alert and oriented to person, place, and time             Deonte Chang MD

## 2021-07-26 NOTE — PROGRESS NOTES
Assessment and Plan:     Problem List Items Addressed This Visit     None      Visit Diagnoses     Medicare annual wellness visit, subsequent    -  Primary      Patient presents today for Medicare wellness visit  He is having some acute pain in the right lower back which will be looking into  He is up-to-date on vaccines  He no longer requires cancer screening at this time  He completes all of his own ADLs and screens negative for depression or cognitive decline  BMI Counseling: Body mass index is 29 85 kg/m²  The BMI is above normal  Nutrition recommendations include encouraging healthy choices of fruits and vegetables  Exercise recommendations include moderate physical activity 150 minutes/week  Preventive health issues were discussed with patient, and age appropriate screening tests were ordered as noted in patient's After Visit Summary  Personalized health advice and appropriate referrals for health education or preventive services given if needed, as noted in patient's After Visit Summary       History of Present Illness:     Patient presents for Medicare Annual Wellness visit    Patient Care Team:  Kita Causey MD as PCP - Jay Lisa MD as PCP - PCP-[facility] ([facility])     Problem List:     Patient Active Problem List   Diagnosis    Obstructive sleep apnea treated with continuous positive airway pressure (CPAP)    Class 1 obesity due to excess calories without serious comorbidity with body mass index (BMI) of 30 0 to 30 9 in adult    History of renal stone    Cavernoma    Convulsions[facility]ptic [facility]    Hypercholesteremia    Localization-related focal epilepsy with complex partial seizures ([facility]    Essential hypertension    Thrombocytopenia ([facility]      Past Medical and Surgical History:     Past Medical History:   Diagnosis Date    [facility]     Occurred after fall with fracture patella    Hyperlipidemia     Hypertension  Seizures (HCC)      Past Surgical History:   Procedure Laterality Date    BLEPHAROPLASTY      last assessed: 2017; upper lid w/ excessive skin     COLONOSCOPY W/ POLYPECTOMY      MOUTH SURGERY      gingival incision due to Dilantin therapy       Family History:     Family History   Problem Relation Age of Onset    Stroke Family         syndrome      Social History:     Social History     Socioeconomic History    Marital status: /Civil Union     Spouse name: None    Number of children: None    Years of education: None    Highest education level: None   Occupational History    None   Tobacco Use    Smoking status: Former Smoker     Packs/day: 1 00     Years: 10 00     Pack years: 10 00     Types: Cigarettes     Quit date:      Years since quittin 6    Smokeless tobacco: Never Used    Tobacco comment: Quit in the    Vaping Use    Vaping Use: Never used   Substance and Sexual Activity    Alcohol use: Yes     Alcohol/week: 5 0 standard drinks     Types: 5 Standard drinks or equivalent per week     Comment: Social     Drug use: No    Sexual activity: Not Currently   Other Topics Concern    None   Social History Narrative    Consumes 2-3 cups of coffee per day     Social Determinants of Health     Financial Resource Strain:     Difficulty of Paying Living Expenses:    Food Insecurity:     Worried About Running Out of Food in the Last Year:     Ran Out of Food in the Last Year:    Transportation Needs:     Lack of Transportation (Medical):      Lack of Transportation (Non-Medical):    Physical Activity:     Days of Exercise per Week:     Minutes of Exercise per Session:    Stress:     Feeling of Stress :    Social Connections:     Frequency of Communication with Friends and Family:     Frequency of Social Gatherings with Friends and Family:     Attends Church Services:     Active Member of Clubs or Organizations:     Attends Club or Organization Meetings:    Antoinette Kirkpatrick Marital Status:    Intimate Partner Violence:     Fear of Current or Ex-Partner:     Emotionally Abused:     Physically Abused:     Sexually Abused:       Medications and Allergies:     Current Outpatient Medications   Medication Sig Dispense Refill    aspirin 81 MG tablet Take 81 mg by mouth every other day       DAILY MULTIPLE VITAMINS tablet Take 1 tablet by mouth daily      furosemide (LASIX) 20 mg tablet Take 1 tablet (20 mg total) by mouth daily 30 tablet 11    levETIRAcetam (KEPPRA) 1000 MG tablet Take 1 tablet by mouth 2 (two) times a day      rosuvastatin (CRESTOR) 5 mg tablet Take 1 tablet (5 mg total) by mouth daily 30 tablet 11     No current facility-administered medications for this visit  No Known Allergies   Immunizations:     Immunization History   Administered Date(s) Administered    INFLUENZA 10/10/2008, 10/18/2014, 10/10/2015, 09/15/2016, 10/02/2017    Influenza Split High Dose Preservative Free IM 10/18/2012, 10/04/2013, 10/18/2014, 10/10/2015, 09/15/2016, 10/02/2017    Influenza, high dose seasonal 0 7 mL 10/03/2018, 10/10/2019, 10/15/2020    Influenza, seasonal, injectable 10/10/2011    Pneumococcal Conjugate 13-Valent 03/25/2015    Pneumococcal Polysaccharide PPV23 10/18/2012    SARS-CoV-2 / COVID-19 mRNA IM (Moderna) 01/28/2021, 02/25/2021    Td (adult), adsorbed 07/01/2004    Tdap 10/03/2016    Zoster 02/25/2010    Zoster Vaccine Recombinant 03/18/2019, 06/12/2019      Health Maintenance: There are no preventive care reminders to display for this patient  Topic Date Due    Influenza Vaccine (1) 09/01/2021      Medicare Health Risk Assessment:     /70 (BP Location: Right arm, Patient Position: Sitting, Cuff Size: Standard)   Pulse 60   Temp 97 5 °F (36 4 °C)   Ht 5' 11" (1 803 m)   Wt 97 1 kg (214 lb)   SpO2 95%   BMI 29 85 kg/m²      Manoj is here for his Subsequent Wellness visit       Health Risk Assessment:   Patient rates overall health as good  Patient feels that their physical health rating is same  Patient is satisfied with their life  Eyesight was rated as same  Hearing was rated as same  Patient feels that their emotional and mental health rating is same  Patients states they are never, rarely angry  Patient states they are never, rarely unusually tired/fatigued  Pain experienced in the last 7 days has been some  Patient's pain rating has been 5/10  Patient states that he has experienced no weight loss or gain in last 6 months  Depression Screening:   PHQ-2 Score: 0      Fall Risk Screening: In the past year, patient has experienced: no history of falling in past year      Home Safety:  Patient does not have trouble with stairs inside or outside of their home  Patient has working smoke alarms and has no working carbon monoxide detector  Home safety hazards include: none  Nutrition:   Current diet is Regular  Medications:   Patient is currently taking over-the-counter supplements  OTC medications include: see medication list  Patient is able to manage medications  Activities of Daily Living (ADLs)/Instrumental Activities of Daily Living (IADLs):   Walk and transfer into and out of bed and chair?: Yes  Dress and groom yourself?: Yes    Bathe or shower yourself?: Yes    Feed yourself? Yes  Do your laundry/housekeeping?: Yes  Manage your money, pay your bills and track your expenses?: Yes  Make your own meals?: Yes    Do your own shopping?: Yes    Previous Hospitalizations:   Any hospitalizations or ED visits within the last 12 months?: No      Advance Care Planning:   Living will: Yes    Durable POA for healthcare:  Yes    Advanced directive: Yes    End of Life Decisions reviewed with patient: Yes      Cognitive Screening:   Provider or family/friend/caregiver concerned regarding cognition?: No    PREVENTIVE SCREENINGS      Cardiovascular Screening:    General: Screening Not Indicated and History Lipid Disorder      Diabetes Screening:     General: Screening Current      Colorectal Cancer Screening:     General: Screening Not Indicated      Prostate Cancer Screening:    General: Screening Not Indicated      Osteoporosis Screening:    General: Screening Not Indicated      Abdominal Aortic Aneurysm (AAA) Screening:    Risk factors include: tobacco use        General: Screening Not Indicated      Lung Cancer Screening:     General: Screening Not Indicated      Hepatitis C Screening:    General: Screening Not Indicated    Screening, Brief Intervention, and Referral to Treatment (SBIRT)    Screening  Typical number of drinks in a day: 1  Typical number of drinks in a week: 7  Interpretation: Low risk drinking behavior      Single Item Drug Screening:  How often have you used an illegal drug (including marijuana) or a prescription medication for non-medical reasons in the past year? never    Single Item Drug Screen Score: 0  Interpretation: Negative screen for possible drug use disorder      Fred Rutledge MD

## 2021-08-03 ENCOUNTER — EVALUATION (OUTPATIENT)
Dept: PHYSICAL THERAPY | Facility: MEDICAL CENTER | Age: 86
End: 2021-08-03
Payer: COMMERCIAL

## 2021-08-03 DIAGNOSIS — M46.1 SI (SACROILIAC) JOINT INFLAMMATION (HCC): Primary | ICD-10-CM

## 2021-08-03 PROCEDURE — 97161 PT EVAL LOW COMPLEX 20 MIN: CPT

## 2021-08-03 PROCEDURE — 97140 MANUAL THERAPY 1/> REGIONS: CPT

## 2021-08-03 PROCEDURE — 97110 THERAPEUTIC EXERCISES: CPT

## 2021-08-03 NOTE — PROGRESS NOTES
PT Evaluation     Today's date: 8/3/2021  Patient name: Andi Aggarwal  : 1935  MRN: [id number]  Referring provider: Berlin Wolfe , *  Dx:   Encounter Diagnosis     ICD-10-CM    1  [facility]  M46 1 Ambulatory referral to Physical Therapy                  Assessment  Assessment details: Andi Aggarwal is a pleasant 81 y o  male who presents with R sided SI joint pain  The patient's greatest concerns are fear of not being able to return to golfing  The primary movement problem is R SI joint and lumbar hypomobility resulting in pain and ROM restriction and limiting his ability to perform ADLs, IADLs and recreational activity  Based on OLIVE, symptoms are consistent with QL and lumbar paraspinal muscle guarding and hypomobility  Pain and ambulation improved following manual interventions  No referral is necessary at this time based on examination results  Primary Impairments:  1) SI joint hypomobility- addressing with manual interventions and ROM exercises  2) Lumbar hypomobility-  addressing with manual interventions and ROM exercises  3) muscle guarding- addressing with manual interventions and ROM     Etiologic factors include underlying core stability deficit  Pt  will benefit from skilled PT services that includes manual therapy techniques to enhance tissue extensibility, neuromuscular re-education to facilitate motor control, therapeutic exercise to increase functional mobility, and modalities prn to reduce pain and inflammation  Impairments: abnormal gait, abnormal muscle firing, abnormal muscle tone, abnormal or restricted ROM, impaired balance, impaired physical strength, lacks appropriate home exercise program and pain with function    Symptom irritability: moderateUnderstanding of Dx/Px/POC: good   Prognosis: good  Prognosis details: Positive prognostic indicators include motivation to improve and social support    Negative prognostic indicators include moderate irritability and hypertension  Goals  Patient will be independent with home exercise program    Patient will be able to manage symptoms independently  Patient will be able to return to golf with no pain   Patient will be able to be (I) with ADLs and IADLs   Patient will be able to ambulate with no pain    Plan  Plan details: Prognosis above is given PT services 2x/week tapering to 1x/week over the next 2 months and home program adherence  Patient would benefit from: skilled physical therapy  Planned modality interventions: thermotherapy: hydrocollator packs  Planned therapy interventions: activity modification, joint mobilization, manual therapy, motor coordination training, neuromuscular re-education, patient education, self care, therapeutic activities, therapeutic exercise, graded activity, home exercise program, behavior modification, balance and gait training  Treatment plan discussed with: patient        Subjective Evaluation    History of Present Illness  Onset date: few weeks ago  Mechanism of injury: Pt reports that his R hip is the problem  He reports that it is worse in the morning and notiveable throughout the day but minimal to no pain at night  This pain began a few weeks ago while spreading mulch, when he moved a bag sideways while on his knees  He reports rolling and lifting his leg to shower are painful  Pain  Current pain ratin  At best pain ratin  At worst pain ratin  Location: R QL  Quality: sharp  Relieving factors: medications    Social Support  Steps to enter house: no  Stairs in house: no   Patient lives at: [facility]  Patient Goals  Patient goals for therapy: return to sport/leisure activities and independence with ADLs/IADLs  Patient goal: golf        Objective     Postural Observations  Standing posture: fair        Palpation   Left   Hypertonic in the lumbar paraspinals  Right   Hypertonic in the lumbar paraspinals and quadratus lumborum  Tenderness of the quadratus lumborum  Tenderness     Lumbar Spine  No tenderness in the left transverse process and right transverse process  Left Hip   No tenderness in the PSIS  Right Hip   No tenderness in the PSIS  Active Range of Motion     Lumbar   Flexion:  Restriction level: moderate  Extension:  Restriction level: moderate  Left lateral flexion: Active left lumbar lateral flexion: r side p!    with pain Restriction level: moderate  Right lateral flexion:  Restriction level: moderate  Left rotation:  with pain Restriction level: moderate  Right rotation: Active right lumbar rotation: r side p!  with pain Restriction level: moderate    Joint Play   Joints within functional limits: T11 and T12     Hypomobile: L1, L2, L3, L4, L5 and S1     Pain: L3, L4, L5 and S1     Strength/Myotome Testing     Left Hip   Planes of Motion   Flexion: 4  Extension: 4  Abduction: 4  Adduction: 4    Right Hip   Planes of Motion   Flexion: 4  Extension: 4-  Abduction: 4-  Adduction: 4-    Left Knee   Flexion: 4+  Extension: 4+    Right Knee   Flexion: 4-  Extension: 4    Left Ankle/Foot   Dorsiflexion: 5  Plantar flexion: 5    Right Ankle/Foot   Dorsiflexion: 5  Plantar flexion: 5    Tests     Lumbar   Positive SIJ compression, sacroiliac distraction , sacral thrust and sacral spring   Left   Negative quadrant  Right   Negative quadrant  Left Pelvic Girdle/Sacrum   Negative: thigh thrust      Right Pelvic Girdle/Sacrum   Positive: thigh thrust      Left Hip   Negative RAHEL, FADIR and long sit  Right Hip   Negative RAHEL, FADIR and long sit  Ambulation     Observational Gait   Gait: antalgic   Decreased right stance time  Functional Assessment      Squat    Left valgus, left tibial anterior translation beyond toes and right tibial anterior translation beyond toes       General Comments:    Lower quarter screen   Hips: unremarkable  Knees: unremarkable    Lumbar Comments  Pain in R low back/SI with knee flexion   Hip flexor tightness b/l             Precautions: HTN, hx of seizures, and hx of DVT       Manuals 8/3            Pas lumbar  JH            SI joint mob             Grade 5 LAD JH            Grade 5 thigh thrust  JH            Neuro Re-Ed                                                                                                        Ther Ex             Hip ADD hooklying x10 5"             Hip ABD hooklying x10 RTB            Standing hip flexor stretch 2x30"                                                                             Ther Activity                                       Gait Training                                       Modalities

## 2021-08-10 ENCOUNTER — OFFICE VISIT (OUTPATIENT)
Dept: PHYSICAL THERAPY | Facility: MEDICAL CENTER | Age: 86
End: 2021-08-10
Payer: COMMERCIAL

## 2021-08-10 DIAGNOSIS — M46.1 SI (SACROILIAC) JOINT INFLAMMATION (HCC): Primary | ICD-10-CM

## 2021-08-10 PROCEDURE — 97140 MANUAL THERAPY 1/> REGIONS: CPT

## 2021-08-10 PROCEDURE — 97112 NEUROMUSCULAR REEDUCATION: CPT

## 2021-08-10 PROCEDURE — 97110 THERAPEUTIC EXERCISES: CPT

## 2021-08-10 NOTE — PROGRESS NOTES
Daily Note     Today's date: 8/10/2021  Patient name: Mitchell Iqbal  : 1935  MRN: [id number]  Referring provider: Cash Valdez , *  Dx:   Encounter Diagnosis     ICD-10-CM    1  SI (sacroiliac) joint inflammation ([facility] [address] )  M46 1                   Subjective: Pt reports that his back feels better  He reports "I actually woke up with more pain on the other side "     Objective: See treatment diary below      Assessment: POC initiated  Tolerated treatment well  Pt presents with mild LLD, which improved following manual Grade 5 LAD of R LE  Pt had L>R QL tonicity  Treated with STM  Symptoms improved by end of session  Patient demonstrated fatigue post treatment, exhibited good technique with therapeutic exercises and would benefit from continued PT to address remaining deficits and return to PLOF  Plan: Continue per plan of care        Precautions: HTN, hx of seizures, and hx of DVT       Manuals 8/3 8/10           Pas lumbar  [facility] JH           SI joint mob  JH           Grade 5 LAD [facility] JH R LE           Grade 5 thigh thrust  JH            Hip flexor stretch             STM/IASTM  Jh b/l QL                        Neuro Re-Ed             Bridges   + ABD 2x10            P-ball squats             Ta activation   2x10 5" supine           Deadbugs              p-ball press down                                       Ther Ex             Hip ADD hooklying x10 5"  HEP           Hip ABD hooklying x10 RTB HEP           Standing hip flexor stretch 2x30" HEP           QL stretch  5x10" b/l standing P-ball           LTR   x20 5"           SKTC   x20 5"           Sidelying clams   2x10 5" b/l                         Ther Activity                                       Gait Training                                       Modalities               5'

## 2021-08-13 ENCOUNTER — OFFICE VISIT (OUTPATIENT)
Dept: PHYSICAL THERAPY | Facility: MEDICAL CENTER | Age: 86
End: 2021-08-13
Payer: COMMERCIAL

## 2021-08-13 DIAGNOSIS — M46.1 SI (SACROILIAC) JOINT INFLAMMATION (HCC): Primary | ICD-10-CM

## 2021-08-13 PROCEDURE — 97140 MANUAL THERAPY 1/> REGIONS: CPT

## 2021-08-13 PROCEDURE — 97110 THERAPEUTIC EXERCISES: CPT

## 2021-08-13 PROCEDURE — 97112 NEUROMUSCULAR REEDUCATION: CPT

## 2021-08-13 NOTE — PROGRESS NOTES
Daily Note     Today's date: 2021  Patient name: Thai Ibrahim  : 1935  MRN: [id number]  Referring provider: Donna Arango , *  Dx:   Encounter Diagnosis     ICD-10-CM    1  SI (sacroiliac) joint inflammation ([facility] [address] )  M46 1                   Subjective: Pt reports that his back feels better  He reports he was unable to lift his leg up to bring it over the tub, but now he is able to  Pt reports that he has not golfed since the injury occurred  Objective: See treatment diary below      Assessment: Tolerated treatment well  Pt presents with mild LLD, which improved following manual Grade 5 LAD of R LE  Introduced reciprocal patterns and transverse plane core strengthening for when pt returns to golf  Pt experienced spasming on L after palloff presses  Patient demonstrated fatigue post treatment, exhibited good technique with therapeutic exercises and would benefit from continued PT to address remaining deficits and return to PLOF  Plan: Continue per plan of care        Precautions: HTN, hx of seizures, and hx of DVT       Manuals 8/3 8/10 8/13          Pas lumbar  [facility] JH          SI joint mob  [facility] JH          Grade 5 LAD [facility] JH R LE JH R LE          Grade 5 thigh thrust  JH            Hip flexor stretch   JH          STM/IASTM  Jh b/l QL JH b/l QL & paraspinal                       Neuro Re-Ed             Bridges   + ABD 2x10  + ABD and ADD 2x10          P-ball squats             Ta activation   2x10 5" supine 2x10 5" supine          p-ball reciprocal presses    x20 supine          Deadbugs              p-ball press down             palloff press   3x30" YTB                                                                           Ther Ex             Hip ADD hooklying x10 5"  HEP           Hip ABD hooklying x10 RTB HEP           Standing hip flexor stretch 2x30" HEP           QL stretch  5x10" b/l standing P-ball 5x10" b/l stand pB          LTR   x20 5" x30 5"          SKTC   x20 5" np Sidelying clams   2x10 5" b/l  x20 5" b/l                       Ther Activity                                       Gait Training                                       Modalities               5' 5' post

## 2021-08-17 ENCOUNTER — OFFICE VISIT (OUTPATIENT)
Dept: PHYSICAL THERAPY | Facility: MEDICAL CENTER | Age: 86
End: 2021-08-17
Payer: COMMERCIAL

## 2021-08-17 DIAGNOSIS — M46.1 SI (SACROILIAC) JOINT INFLAMMATION (HCC): Primary | ICD-10-CM

## 2021-08-17 PROCEDURE — 97110 THERAPEUTIC EXERCISES: CPT

## 2021-08-17 PROCEDURE — 97140 MANUAL THERAPY 1/> REGIONS: CPT

## 2021-08-17 PROCEDURE — 97112 NEUROMUSCULAR REEDUCATION: CPT

## 2021-08-17 NOTE — PROGRESS NOTES
Daily Note     Today's date: 2021  Patient name: Gunjan Blank  : 1935  MRN: [id number]  Referring provider: Alyssa Rosas , *  Dx:   Encounter Diagnosis     ICD-10-CM    1  SI (sacroiliac) joint inflammation ([facility] )  M46 1                   Subjective: Pt reports that the R side of his back feels good  He has more L sided pain  Pt reports that the TA activation at home began bothering him  He states that he has pain on the L side of sacrum  Objective: See treatment diary below      Assessment: Tolerated treatment well  Based on pt subjective complaint performed piriformis release, which improved the pt's symptoms  Trialed HEP exercises to assess which exercises was reproducing pain at home  Discontinues hip flexor stretch secondary to increase in pain  Patient demonstrated fatigue post treatment, exhibited good technique with therapeutic exercises and would benefit from continued PT to address remaining deficits and return to PLOF  Plan: Continue per plan of care        Precautions: HTN, hx of seizures, and hx of DVT       Manuals 8/3 8/10 8/13 8/17         Pas lumbar  [facility] CAROLE [facility] CAROLE [facility] JH         SI joint mob  [facility] JH         Grade 5 LAD [facility] JH R LE JH R LE          Grade 5 thigh thrust  JH            Hip flexor stretch   JH          STM/IASTM  Jh b/l QL JH b/l QL & paraspinal JH piriformis L         Piriformis release    JH                                                             Neuro Re-Ed             Bridges   + ABD 2x10  + ABD and ADD 2x10 + ABD and ADD 2x10 ea         P-ball squats             Ta activation   2x10 5" supine 2x10 5" supine 2x10 5" supine         p-ball reciprocal presses    x20 supine          Deadbugs              p-ball press down             palloff press   3x30" YTB np spasm last time                                                                          Ther Ex             Hip ADD hooklying x10 5"  HEP           Hip ABD hooklying x10 RTB HEP           Standing hip flexor stretch 2x30" HEP           QL stretch  5x10" b/l standing P-ball 5x10" b/l stand pB 10x10" b/l pball         LTR   x20 5" x30 5" x30 5"         SKTC   x20 5" np          Sidelying clams   2x10 5" b/l  x20 5" b/l 3x10 b/l         Piriformis stretch    3x30"         Supine hip flexor stretch     2x30" p!  Upon standing                                                Ther Activity                                       Gait Training                                       Modalities             MH  5' 5' post  5' post

## 2021-08-20 ENCOUNTER — OFFICE VISIT (OUTPATIENT)
Dept: PHYSICAL THERAPY | Facility: MEDICAL CENTER | Age: 86
End: 2021-08-20
Payer: COMMERCIAL

## 2021-08-20 DIAGNOSIS — M46.1 SI (SACROILIAC) JOINT INFLAMMATION (HCC): Primary | ICD-10-CM

## 2021-08-20 PROCEDURE — 97140 MANUAL THERAPY 1/> REGIONS: CPT

## 2021-08-20 PROCEDURE — 97110 THERAPEUTIC EXERCISES: CPT

## 2021-08-20 PROCEDURE — 97112 NEUROMUSCULAR REEDUCATION: CPT

## 2021-08-20 NOTE — PROGRESS NOTES
Daily Note     Today's date: 2021  Patient name: Jennie Virgen  : 1935  MRN: [id number]  Referring provider: Karuna West , *  Dx:   Encounter Diagnosis     ICD-10-CM    1  SI (sacroiliac) joint inflammation ([facility] [address] )  M46 1                   Subjective: Pt reports that is having L sided sacral pain  Objective: See treatment diary below      Assessment: Tolerated treatment well  Discussed dialing back to HEP to 2x/weeks with PT 2x/week- increased pain may potentially be from lack of rest  Patient demonstrated fatigue post treatment, exhibited good technique with therapeutic exercises and would benefit from continued PT to address remaining deficits and return to PLOF  Plan: Continue per plan of care        Precautions: HTN, hx of seizures, and hx of DVT       Manuals 8/3 8/10 8/13 8/17 8/20        Pas lumbar  [facility]        SI joint mob  [facility] J[facility]        Grade 5 LAD JH JH R LE JH R LE  JH L        Grade 5 thigh thrust  JH            Hip flexor stretch  [facility]  np        STM/IASTM  Jh b/l QL JH b/l QL & paraspinal JH piriformis L JH piriformis L         Piriformis release    [facility]                                                            Neuro Re-Ed             Bridges   + ABD 2x10  + ABD and ADD 2x10 + ABD and ADD 2x10 ea np        P-ball squats             Ta activation   2x10 5" supine 2x10 5" supine 2x10 5" supine 2x10 5" supine         p-ball reciprocal presses    x20 supine  x20 supine        Deadbugs              p-ball press down     x20 standing         palloff press   3x30" YTB np spasm last time         Standing hip ABD     3x10 b/l         Standing hip EXt     3x10 b/l                                               Ther Ex             Hip ADD hooklying x10 5"  HEP           Hip ABD hooklying x10 RTB HEP           Standing hip flexor stretch 2x30" HEP           QL stretch  5x10" b/l standing P-ball 5x10" b/l stand pB 10x10" b/l pball 10"x10" b/l pball        LTR   x20 5" x30 5" x30 5" HEP        SKTC   x20 5" np          Sidelying clams   2x10 5" b/l  x20 5" b/l 3x10 b/l 3x10 b/l         Piriformis stretch    3x30" np        Supine hip flexor stretch     2x30" p!  Upon standing np                                               Ther Activity                                       Gait Training                                       Modalities             MH  5' 5' post  5' post  deferred

## 2021-08-24 ENCOUNTER — OFFICE VISIT (OUTPATIENT)
Dept: PHYSICAL THERAPY | Facility: MEDICAL CENTER | Age: 86
End: 2021-08-24
Payer: COMMERCIAL

## 2021-08-24 DIAGNOSIS — M46.1 SI (SACROILIAC) JOINT INFLAMMATION (HCC): Primary | ICD-10-CM

## 2021-08-24 PROCEDURE — 97140 MANUAL THERAPY 1/> REGIONS: CPT

## 2021-08-24 PROCEDURE — 97112 NEUROMUSCULAR REEDUCATION: CPT

## 2021-08-24 PROCEDURE — 97110 THERAPEUTIC EXERCISES: CPT

## 2021-08-24 NOTE — PROGRESS NOTES
Daily Note     Today's date: 2021  Patient name: Gunjan Blank  : 1935  MRN: [id number]  Referring provider: Alyssa Rosas , *  Dx:   Encounter Diagnosis     ICD-10-CM    1  SI (sacroiliac) joint inflammation ([facility] [address] )  M46 1                   Subjective: Pt reports that he is feeling better and has less pain  Objective: See treatment diary below      Assessment: Tolerated treatment well  Decreasing frequency of HEP has improved pt symptoms  Continue to monitor and adjust plan as needed  Begin introducing frontal and transverse plane core strengthening to pt tolerance  Patient demonstrated fatigue post treatment, exhibited good technique with therapeutic exercises and would benefit from continued PT to improve functionality, decrease pain, and meet PT and pt goals  Plan: Continue per plan of care        Precautions: HTN, hx of seizures, and hx of DVT       Manuals 8/3 8/10 8/13 8/17 8/20 8/24       [facility]       SI joint mob  [facility]       Grade 5 LAD JH JH R LE JH R LE  JH L JH R       Grade 5 thigh thrust  JH            Hip flexor stretch  [facility]  np        STM/IASTM  Jh b/l QL JH b/l QL & paraspinal JH piriformis L JH piriformis L  Jh piriformis/QL/paraspinals       Piriformis release    [facility]                                                           Neuro Re-Ed             Bridges   + ABD 2x10  + ABD and ADD 2x10 + ABD and ADD 2x10 ea np +  ADD 3x10       P-ball squats             Ta activation   2x10 5" supine 2x10 5" supine 2x10 5" supine 2x10 5" supine  2x10 5"        p-ball reciprocal presses    x20 supine  x20 supine x20 supine       Deadbugs              p-ball press down     x20 standing  x20 standing       palloff press   3x30" YTB np spasm last time         Standing hip ABD     3x10 b/l  3x10 b/l       Standing hip EXt     3x10 b/l 3x10 b/l                                              Ther Ex             Hip ADD hooklying x10 5"  HEP           Hip ABD hooklying x10 RTB HEP    3x10 GTB       Standing hip flexor stretch 2x30" HEP           QL stretch  5x10" b/l standing P-ball 5x10" b/l stand pB 10x10" b/l pball 10"x10" b/l pball 10" x10 pball       LTR   x20 5" x30 5" x30 5" HEP        SKTC   x20 5" np          Sidelying clams   2x10 5" b/l  x20 5" b/l 3x10 b/l 3x10 b/l  3x10 b/l       Piriformis stretch    3x30" np        Supine hip flexor stretch     2x30" p!  Upon standing np                                               Ther Activity                                       Gait Training                                       Modalities             MH  5' 5' post  5' post  deferred deferred

## 2021-08-27 ENCOUNTER — OFFICE VISIT (OUTPATIENT)
Dept: PHYSICAL THERAPY | Facility: MEDICAL CENTER | Age: 86
End: 2021-08-27
Payer: COMMERCIAL

## 2021-08-27 DIAGNOSIS — M46.1 SI (SACROILIAC) JOINT INFLAMMATION (HCC): Primary | ICD-10-CM

## 2021-08-27 PROCEDURE — 97110 THERAPEUTIC EXERCISES: CPT

## 2021-08-27 PROCEDURE — 97140 MANUAL THERAPY 1/> REGIONS: CPT

## 2021-08-27 PROCEDURE — 97112 NEUROMUSCULAR REEDUCATION: CPT

## 2021-08-27 NOTE — PROGRESS NOTES
Daily Note     Today's date: 2021  Patient name: Rodriguez Peña  : 1935  MRN: [id number]  Referring provider: Macarena García , *  Dx:   Encounter Diagnosis     ICD-10-CM    1  SI (sacroiliac) joint inflammation ([facility] [address] )  M46 1                   Subjective: Pt reports that he is feeling better and has less pain  He reports that he has stiffness in the morning that improves with LTR  Objective: See treatment diary below      Assessment: Tolerated treatment well  Pt demonstrates hamstring tightness treated with hamstring stretching  He presents with LLD treated with MET and HVLAT LAD  He had improvement in symptoms following manual therapy  Patient demonstrated fatigue post treatment, exhibited good technique with therapeutic exercises and would benefit from continued PT to improve functionality, decrease pain, and meet PT and pt goals  Plan: Continue per plan of care        Precautions: HTN, hx of seizures, and hx of DVT       Manuals 8/3 8/10 8/13 8/17 8/20 8/24 8/27      Pas lumbar  [facility]      SI joint mob  [facility] JH      Grade 5 LAD JH JH R LE JH R LE  JH L JH R JH R      Grade 5 thigh thrust  JH            Hip flexor stretch  [facility]  np        STM/IASTM  Jh b/l QL JH b/l QL & paraspinal  piriformis L JH piriformis L  Jh piriformis/QL/paraspinals Jh piriformis       Piriformis release    [facility]                                                          Neuro Re-Ed             Bridges   + ABD 2x10  + ABD and ADD 2x10 + ABD and ADD 2x10 ea np +  ADD 3x10 + ABD GTB and ADD 2x10      P-ball squats             Ta activation   2x10 5" supine 2x10 5" supine 2x10 5" supine 2x10 5" supine  2x10 5"  2x10 5"      p-ball reciprocal presses    x20 supine  x20 supine x20 supine x20 supine      Deadbugs              p-ball press down     x20 standing  x20 standing x20 standing       palloff press   3x30" YTB np spasm last time         Standing hip ABD     3x10 b/l  3x10 b/l 3x10 b/l      Standing hip EXt     3x10 b/l 3x10 b/l 3x10 b/l                                             Ther Ex             Hip ADD hooklying x10 5"  HEP           Hip ABD hooklying x10 RTB HEP    3x10 GTB       Standing hip flexor stretch 2x30" HEP           QL stretch  5x10" b/l standing P-ball 5x10" b/l stand pB 10x10" b/l pball 10"x10" b/l pball 10" x10 pball 10" x10 pball      LTR   x20 5" x30 5" x30 5" HEP        SKTC   x20 5" np          Sidelying clams   2x10 5" b/l  x20 5" b/l 3x10 b/l 3x10 b/l  3x10 b/l 2x10 b/l GTB      Piriformis stretch    3x30" np        Supine hip flexor stretch     2x30" p!  Upon standing np        Seated hamstring str       3x30"                                Ther Activity                                       Gait Training                                       Modalities               5' 5' post  5' post  deferred deferred

## 2021-08-31 ENCOUNTER — OFFICE VISIT (OUTPATIENT)
Dept: PHYSICAL THERAPY | Facility: MEDICAL CENTER | Age: 86
End: 2021-08-31
Payer: COMMERCIAL

## 2021-08-31 DIAGNOSIS — M46.1 SI (SACROILIAC) JOINT INFLAMMATION (HCC): Primary | ICD-10-CM

## 2021-08-31 PROCEDURE — 97110 THERAPEUTIC EXERCISES: CPT

## 2021-08-31 PROCEDURE — 97140 MANUAL THERAPY 1/> REGIONS: CPT

## 2021-08-31 PROCEDURE — 97112 NEUROMUSCULAR REEDUCATION: CPT

## 2021-09-03 ENCOUNTER — OFFICE VISIT (OUTPATIENT)
Dept: PHYSICAL THERAPY | Facility: MEDICAL CENTER | Age: 86
End: 2021-09-03
Payer: COMMERCIAL

## 2021-09-03 DIAGNOSIS — M46.1 SI (SACROILIAC) JOINT INFLAMMATION (HCC): Primary | ICD-10-CM

## 2021-09-03 PROCEDURE — 97112 NEUROMUSCULAR REEDUCATION: CPT

## 2021-09-03 PROCEDURE — 97530 THERAPEUTIC ACTIVITIES: CPT

## 2021-09-03 PROCEDURE — 97110 THERAPEUTIC EXERCISES: CPT

## 2021-09-03 NOTE — PROGRESS NOTES
Daily Note     Today's date: 9/3/2021  Patient name: Medina Hagen  : 1935  MRN: [id number]  Referring provider: Nettie Weathers , *  Dx:   Encounter Diagnosis     ICD-10-CM    1  SI (sacroiliac) joint inflammation ([facility] [address] )  M46 1                   Subjective: Pt reports that he feels good  He tried swinging at home and felt good with no adverse effects after  Objective: See treatment diary below      Assessment: Tolerated treatment well  Incorporated higher volume of swings to tolerance with no adverse effects  Discussed starting swings at home between 40-50 swings throughout the day in order to safely return to golf  Manual therapy was not indicated today  Also, discussed tapering down PT to one time a week for maintenance and updates on pt status after golfing until patient is able to achieve all goals  Patient demonstrated fatigue post treatment, exhibited good technique with therapeutic exercises and would benefit from continued PT to improve functionality to safely return to PLOF  Plan: Continue per plan of care        Precautions: HTN, hx of seizures, and hx of DVT       Manuals 8/3 8/10 8/13 8/17 8/20 8/24 8/27 8/31 9/3    Pas lumbar  [facility]     SI joint mob  [facility] JH JH JH JH JH     Grade 5 LAD JH JH R LE JH R LE  JH L JH R JH R JH R     Grade 5 thigh thrust  JH            Hip flexor stretch  J[facility] JH  np        STM/IASTM  Jh b/l QL JH b/l QL & paraspinal JH piriformis L JH piriformis L  Jh piriformis/QL/paraspinals Jh piriformis  JH piriformis     Piriformis release    [facility]                                                          Neuro Re-Ed             Bridges   + ABD 2x10  + ABD and ADD 2x10 + ABD and ADD 2x10 ea np +  ADD 3x10 + ABD GTB and ADD 2x10 GTB ABD and ADD 2x10 BlueTB ABD and ADD 2x10 ea    P-ball squats             Ta activation   2x10 5" supine 2x10 5" supine 2x10 5" supine 2x10 5" supine  2x10 5"  2x10 5" 2x10 5" 2x10 5"    p-ball reciprocal presses x20 supine  x20 supine x20 supine x20 supine      Deadbugs              p-ball press down     x20 standing  x20 standing x20 standing  x20 + marches x50 marches    palloff press   3x30" YTB np spasm last time    marisa 2# 3x10, 3# 2x10 ROT GTB x30 b/l ROT    Standing hip ABD     3x10 b/l  3x10 b/l 3x10 b/l 3x10 b/l 3x10 b/l    Standing hip EXt     3x10 b/l 3x10 b/l 3x10 b/l 3x10 b/l 3x10 b/l    Hip ROT marisa        5# x20 + gold swing 5# x20                               Ther Ex             Hip ADD hooklying x10 5"  HEP           Hip ABD hooklying x10 RTB HEP    3x10 GTB       Standing hip flexor stretch 2x30" HEP           QL stretch  5x10" b/l standing P-ball 5x10" b/l stand pB 10x10" b/l pball 10"x10" b/l pball 10" x10 pball 10" x10 pball 10"x10 pball 10"x10 pball    LTR   x20 5" x30 5" x30 5" HEP        SKTC   x20 5" np          Sidelying clams   2x10 5" b/l  x20 5" b/l 3x10 b/l 3x10 b/l  3x10 b/l 2x10 b/l GTB  3x10 blue TB b/l    Piriformis stretch    3x30" np        Supine hip flexor stretch     2x30" p!  Upon standing np        Seated hamstring str       3x30" 3x30" 3x30"    Open books         x20 b/l x20 b/l                 Ther Activity             Glf swing         50-% 5wood and 7iron x20 swings ea %                 Gait Training                                       Modalities             MH  5' 5' post  5' post  deferred deferred

## 2021-09-10 ENCOUNTER — OFFICE VISIT (OUTPATIENT)
Dept: PHYSICAL THERAPY | Facility: MEDICAL CENTER | Age: 86
End: 2021-09-10
Payer: COMMERCIAL

## 2021-09-10 DIAGNOSIS — M46.1 SI (SACROILIAC) JOINT INFLAMMATION (HCC): Primary | ICD-10-CM

## 2021-09-10 PROCEDURE — 97112 NEUROMUSCULAR REEDUCATION: CPT

## 2021-09-10 PROCEDURE — 97110 THERAPEUTIC EXERCISES: CPT

## 2021-09-10 NOTE — PROGRESS NOTES
Daily Note     Today's date: 9/10/2021  Patient name: Nery Lord  : 1935  MRN: [id number]  Referring provider: Jorge Don , *  Dx:   Encounter Diagnosis     ICD-10-CM    1  SI (sacroiliac) joint inflammation ([facility] [address] )  M46 1                   Subjective: Pt reports that he feels good  He reports that he has no problems with swinging at home  He states that he will try to go to the range this weekend a golf 9 holes on Tuesday  Objective: See treatment diary below      Assessment: Pt presnets with no pain  Observation ambulation and sit to stand have improved significantly since starting therapy  Tolerated exercise progressions well with no adverse effects  Tolerated treatment well  Discussed warm up prior to golfing on Tuesday and focus on core with swings  Patient demonstrated fatigue post treatment, exhibited good technique with therapeutic exercises and would benefit from continued PT to improve functionality to safely return to PLOF  Plan: Continue per plan of care        Precautions: HTN, hx of seizures, and hx of DVT       Manuals 9/10            Pas lumbar              SI joint mob             Grade 5 LAD             Grade 5 thigh thrust              Hip flexor stretch             STM/IASTM             Piriformis release                                                                 Neuro Re-Ed             Bridges  BlueTB ABD and ADD 2x10 ea            P-ball squats             Ta activation  HEP            p-ball reciprocal presses              Deadbugs              p-ball press down x30 marches + 2# weight            palloff press GTB x30 b/l ROT            Standing hip ABD 3x10 b/l 2#            Standing hip EXt 3x10b/l 2#            Hip ROT marisa 5# x20            Side steps marisa 15# 5x                         Ther Ex             Hip ADD hooklying             Hip ABD hooklying             Standing hip flexor stretch             QL stretch 10"x10 pball            LTR SKTC              Sidelying clams  3x10 blue TB b/l            Piriformis stretch             Supine hip flexor stretch              Seated hamstring str 3x30"            Open books              rec bike 7'                                      Ther Activity             Glf swing                          Gait Training                                       Modalities             MH

## 2021-09-17 ENCOUNTER — OFFICE VISIT (OUTPATIENT)
Dept: PHYSICAL THERAPY | Facility: MEDICAL CENTER | Age: 86
End: 2021-09-17
Payer: COMMERCIAL

## 2021-09-17 DIAGNOSIS — M46.1 SI (SACROILIAC) JOINT INFLAMMATION (HCC): Primary | ICD-10-CM

## 2021-09-17 PROCEDURE — 97110 THERAPEUTIC EXERCISES: CPT

## 2021-09-17 NOTE — PROGRESS NOTES
Daily Note     Today's date: 2021  Patient name: Medina Hagen  : 1935  MRN: [id number]  Referring provider: Nettie Weathers , *  Dx:   Encounter Diagnosis     ICD-10-CM    1  SI (sacroiliac) joint inflammation ([facility] [address] )  M46 1                   Subjective: Pt reports that he golfed 14/ holes on Tuesday with no issues  He reports that he feels 100% better and no longer need therapy  Objective: See treatment diary below      Assessment:   Medina Hagen has been compliant with attending PT and home exercise program since initial eval   Electa Phillipjames  has made improvements in objective data since initial evalulation and has achieved all goals  Patient reports having returned to their prior level or function  Patient provided with updated Home Exercise Program, all questions answered, verbalized understanding and agreement to plan of care   Thus it was mutually decided to discontinue this episode of care and transition to Home Exercise Program           Plan: D/C to HEP     Precautions: HTN, hx of seizures, and hx of DVT       Manuals 9/10 9/17           Pas lumbar              SI joint mob             Grade 5 LAD             Grade 5 thigh thrust              Hip flexor stretch             STM/IASTM             Piriformis release                                                                 Neuro Re-Ed             Bridges  BlueTB ABD and ADD 2x10 ea            P-ball squats             Ta activation  HEP            p-ball reciprocal presses              Deadbugs              p-ball press down x30 marches + 2# weight            palloff press GTB x30 b/l ROT            Standing hip ABD 3x10 b/l 2#            Standing hip EXt 3x10b/l 2#            Hip ROT marisa 5# x20            Side steps marisa 15# 5x                         Ther Ex             Hip ADD hooklying             Hip ABD hooklying             Standing hip flexor stretch             QL stretch 10"x10 pball            LTR              SKTC Sidelying clams  3x10 blue TB b/l            Piriformis stretch             Supine hip flexor stretch              Seated hamstring str 3x30"            Open books              rec bike 7'                         HEP and pt education for d/c  10'           Ther Activity             Glf swing                          Gait Training                                       Modalities             MH

## 2021-09-24 ENCOUNTER — APPOINTMENT (OUTPATIENT)
Dept: PHYSICAL THERAPY | Facility: MEDICAL CENTER | Age: 86
End: 2021-09-24
Payer: COMMERCIAL

## 2022-01-19 ENCOUNTER — APPOINTMENT (OUTPATIENT)
Dept: LAB | Facility: CLINIC | Age: 87
End: 2022-01-19
Payer: COMMERCIAL

## 2022-01-19 DIAGNOSIS — E78.00 HYPERCHOLESTEREMIA: ICD-10-CM

## 2022-01-19 DIAGNOSIS — D69.6 THROMBOCYTOPENIA (HCC): ICD-10-CM

## 2022-01-19 DIAGNOSIS — G40.209 LOCALIZATION-RELATED FOCAL EPILEPSY WITH COMPLEX PARTIAL SEIZURES (HCC): ICD-10-CM

## 2022-01-19 DIAGNOSIS — I10 ESSENTIAL HYPERTENSION: ICD-10-CM

## 2022-01-19 LAB
ALBUMIN SERPL BCP-MCNC: 3.4 G/DL (ref 3.5–5)
ALP SERPL-CCNC: 84 U/L (ref 46–116)
ALT SERPL W P-5'-P-CCNC: 24 U/L (ref 12–78)
ANION GAP SERPL CALCULATED.3IONS-SCNC: 6 MMOL/L (ref 4–13)
AST SERPL W P-5'-P-CCNC: 22 U/L (ref 5–45)
BASOPHILS # BLD AUTO: 0.02 THOUSANDS/ΜL (ref 0–0.1)
BASOPHILS NFR BLD AUTO: 0 % (ref 0–1)
BILIRUB SERPL-MCNC: 1.29 MG/DL (ref 0.2–1)
BUN SERPL-MCNC: 23 MG/DL (ref 5–25)
CALCIUM ALBUM COR SERPL-MCNC: 8.8 MG/DL (ref 8.3–10.1)
CALCIUM SERPL-MCNC: 8.3 MG/DL (ref 8.3–10.1)
CHLORIDE SERPL-SCNC: 108 MMOL/L (ref 100–108)
CHOLEST SERPL-MCNC: 152 MG/DL
CO2 SERPL-SCNC: 27 MMOL/L (ref 21–32)
CREAT SERPL-MCNC: 1.05 MG/DL (ref 0.6–1.3)
EOSINOPHIL # BLD AUTO: 0.14 THOUSAND/ΜL (ref 0–0.61)
EOSINOPHIL NFR BLD AUTO: 3 % (ref 0–6)
ERYTHROCYTE [DISTWIDTH] IN BLOOD BY AUTOMATED COUNT: 13.2 % (ref 11.6–15.1)
GFR SERPL CREATININE-BSD FRML MDRD: 64 ML/MIN/1.73SQ M
GLUCOSE P FAST SERPL-MCNC: 97 MG/DL (ref 65–99)
HCT VFR BLD AUTO: 44.7 % (ref 36.5–49.3)
HDLC SERPL-MCNC: 61 MG/DL
HGB BLD-MCNC: 15.4 G/DL (ref 12–17)
IMM GRANULOCYTES # BLD AUTO: 0.01 THOUSAND/UL (ref 0–0.2)
IMM GRANULOCYTES NFR BLD AUTO: 0 % (ref 0–2)
LDLC SERPL CALC-MCNC: 76 MG/DL (ref 0–100)
LYMPHOCYTES # BLD AUTO: 0.87 THOUSANDS/ΜL (ref 0.6–4.47)
LYMPHOCYTES NFR BLD AUTO: 19 % (ref 14–44)
MCH RBC QN AUTO: 33.3 PG (ref 26.8–34.3)
MCHC RBC AUTO-ENTMCNC: 34.5 G/DL (ref 31.4–37.4)
MCV RBC AUTO: 97 FL (ref 82–98)
MONOCYTES # BLD AUTO: 0.37 THOUSAND/ΜL (ref 0.17–1.22)
MONOCYTES NFR BLD AUTO: 8 % (ref 4–12)
NEUTROPHILS # BLD AUTO: 3.09 THOUSANDS/ΜL (ref 1.85–7.62)
NEUTS SEG NFR BLD AUTO: 70 % (ref 43–75)
NRBC BLD AUTO-RTO: 0 /100 WBCS
PLATELET # BLD AUTO: 116 THOUSANDS/UL (ref 149–390)
PMV BLD AUTO: 10.6 FL (ref 8.9–12.7)
POTASSIUM SERPL-SCNC: 4.2 MMOL/L (ref 3.5–5.3)
PROT SERPL-MCNC: 6.8 G/DL (ref 6.4–8.2)
RBC # BLD AUTO: 4.63 MILLION/UL (ref 3.88–5.62)
SODIUM SERPL-SCNC: 141 MMOL/L (ref 136–145)
TRIGL SERPL-MCNC: 73 MG/DL
WBC # BLD AUTO: 4.5 THOUSAND/UL (ref 4.31–10.16)

## 2022-01-19 PROCEDURE — 85025 COMPLETE CBC W/AUTO DIFF WBC: CPT

## 2022-01-19 PROCEDURE — 80061 LIPID PANEL: CPT

## 2022-01-19 PROCEDURE — 80177 DRUG SCRN QUAN LEVETIRACETAM: CPT

## 2022-01-19 PROCEDURE — 36415 COLL VENOUS BLD VENIPUNCTURE: CPT

## 2022-01-19 PROCEDURE — 80053 COMPREHEN METABOLIC PANEL: CPT

## 2022-01-25 LAB — LEVETIRACETAM SERPL-MCNC: 18.5 UG/ML (ref 10–40)

## 2022-01-28 ENCOUNTER — OFFICE VISIT (OUTPATIENT)
Dept: FAMILY MEDICINE CLINIC | Facility: CLINIC | Age: 87
End: 2022-01-28
Payer: COMMERCIAL

## 2022-01-28 VITALS
RESPIRATION RATE: 18 BRPM | DIASTOLIC BLOOD PRESSURE: 70 MMHG | HEART RATE: 72 BPM | SYSTOLIC BLOOD PRESSURE: 136 MMHG | OXYGEN SATURATION: 96 % | HEIGHT: 71 IN | BODY MASS INDEX: 29.68 KG/M2 | WEIGHT: 212 LBS

## 2022-01-28 DIAGNOSIS — D69.6 THROMBOCYTOPENIA (HCC): ICD-10-CM

## 2022-01-28 DIAGNOSIS — R17 ELEVATED BILIRUBIN: ICD-10-CM

## 2022-01-28 DIAGNOSIS — G40.209 LOCALIZATION-RELATED FOCAL EPILEPSY WITH COMPLEX PARTIAL SEIZURES (HCC): ICD-10-CM

## 2022-01-28 DIAGNOSIS — M17.11 PRIMARY OSTEOARTHRITIS OF RIGHT KNEE: ICD-10-CM

## 2022-01-28 DIAGNOSIS — I10 ESSENTIAL HYPERTENSION: Primary | ICD-10-CM

## 2022-01-28 DIAGNOSIS — L57.0 AK (ACTINIC KERATOSIS): ICD-10-CM

## 2022-01-28 DIAGNOSIS — E78.00 HYPERCHOLESTEREMIA: ICD-10-CM

## 2022-01-28 DIAGNOSIS — M46.1 SI (SACROILIAC) JOINT INFLAMMATION (HCC): ICD-10-CM

## 2022-01-28 DIAGNOSIS — E66.3 OVERWEIGHT (BMI 25.0-29.9): ICD-10-CM

## 2022-01-28 PROCEDURE — 20610 DRAIN/INJ JOINT/BURSA W/O US: CPT | Performed by: FAMILY MEDICINE

## 2022-01-28 PROCEDURE — 99215 OFFICE O/P EST HI 40 MIN: CPT | Performed by: FAMILY MEDICINE

## 2022-01-28 PROCEDURE — 3725F SCREEN DEPRESSION PERFORMED: CPT | Performed by: FAMILY MEDICINE

## 2022-01-28 PROCEDURE — 3078F DIAST BP <80 MM HG: CPT | Performed by: FAMILY MEDICINE

## 2022-01-28 PROCEDURE — 1036F TOBACCO NON-USER: CPT | Performed by: FAMILY MEDICINE

## 2022-01-28 PROCEDURE — 1160F RVW MEDS BY RX/DR IN RCRD: CPT | Performed by: FAMILY MEDICINE

## 2022-01-28 PROCEDURE — 3075F SYST BP GE 130 - 139MM HG: CPT | Performed by: FAMILY MEDICINE

## 2022-01-28 RX ORDER — ROSUVASTATIN CALCIUM 5 MG/1
5 TABLET, COATED ORAL DAILY
Qty: 30 TABLET | Refills: 11 | Status: SHIPPED | OUTPATIENT
Start: 2022-01-28

## 2022-01-28 RX ORDER — TRIAMCINOLONE ACETONIDE 40 MG/ML
40 INJECTION, SUSPENSION INTRA-ARTICULAR; INTRAMUSCULAR
Status: COMPLETED | OUTPATIENT
Start: 2022-01-28 | End: 2022-01-28

## 2022-01-28 RX ORDER — LIDOCAINE HYDROCHLORIDE 10 MG/ML
2 INJECTION, SOLUTION INFILTRATION; PERINEURAL
Status: COMPLETED | OUTPATIENT
Start: 2022-01-28 | End: 2022-01-28

## 2022-01-28 RX ORDER — FUROSEMIDE 20 MG/1
20 TABLET ORAL DAILY
Qty: 30 TABLET | Refills: 11 | Status: SHIPPED | OUTPATIENT
Start: 2022-01-28

## 2022-01-28 RX ADMIN — TRIAMCINOLONE ACETONIDE 40 MG: 40 INJECTION, SUSPENSION INTRA-ARTICULAR; INTRAMUSCULAR at 09:12

## 2022-01-28 RX ADMIN — LIDOCAINE HYDROCHLORIDE 2 ML: 10 INJECTION, SOLUTION INFILTRATION; PERINEURAL at 09:12

## 2022-01-28 NOTE — PROGRESS NOTES
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)     Stable on Keppra  Musculoskeletal and Integument    SI (sacroiliac) joint inflammation (HCC)     Low back pain is stable  Continue with stretching at home  Physical therapy was helpful         Primary osteoarthritis of right knee     His right knee with 1 mL of 40 mg/mL Kenalog as well as 2 mL of 1% lidocaine  He tolerated procedure well  AK (actinic keratosis)     He appears to have multiple actinic keratoses on his forehead and I am going to have him see Derm at his leisure         Relevant Orders    Ambulatory referral to Dermatology       Other    Hypercholesteremia     Continue low-dose Crestor         Relevant Medications    rosuvastatin (CRESTOR) 5 mg tablet    Other Relevant Orders    Comprehensive metabolic panel    CBC and differential    Comprehensive metabolic panel    Lipid Panel with Direct LDL reflex    Thrombocytopenia (HCC)     Continue routine monitoring of his CBC  Consider Hematology consultation should this get worse  Relevant Orders    CBC and differential    CBC and differential    Comprehensive metabolic panel    Lipid Panel with Direct LDL reflex      Other Visit Diagnoses     Overweight (BMI 25 0-29  9)        Elevated bilirubin        Relevant Orders    Comprehensive metabolic panel    CBC and differential    CBC and differential    Comprehensive metabolic panel    Lipid Panel with Direct LDL reflex          Large joint arthrocentesis: R knee  Universal Protocol:  Consent: Verbal consent obtained  Risks and benefits: risks, benefits and alternatives were discussed  Consent given by: patient  Patient identity confirmed: verbally with patient    Supporting Documentation  Indications: pain   Procedure Details  Location: knee - R knee  Needle size: 25 G  Ultrasound guidance: no  Approach: lateral  Medications administered: 2 mL lidocaine 1 %; 40 mg triamcinolone acetonide 40 mg/mL    Patient tolerance: patient tolerated the procedure well with no immediate complications  Dressing:  Sterile dressing applied          Subjective:      Patient ID: Elena Parrish is a 81 y o  male  HPI patient presents today for follow-up for chronic health issues  Having some increased pain in his right knee and would like an injection if this would help  He has never had 1 before  He does have a history of a seizure disorder to remains quite stable with Keppra  He has history of hypertension with some lower extremity swelling which is stable as well  Platelets remained stable on recent labs and he is having no bruising or bleeding    Recent labs did review a mild elevation of his bilirubin which is new but other LFTs were normal     The following portions of the patient's history were reviewed and updated as appropriate: allergies, current medications, past family history, past medical history, past social history, past surgical history and problem list       Current Outpatient Medications:     aspirin 81 MG tablet, Take 81 mg by mouth every other day , Disp: , Rfl:     DAILY MULTIPLE VITAMINS tablet, Take 1 tablet by mouth daily, Disp: , Rfl:     furosemide (LASIX) 20 mg tablet, Take 1 tablet (20 mg total) by mouth daily, Disp: 30 tablet, Rfl: 11    levETIRAcetam (KEPPRA) 1000 MG tablet, Take 1 tablet by mouth 2 (two) times a day, Disp: , Rfl:     rosuvastatin (CRESTOR) 5 mg tablet, Take 1 tablet (5 mg total) by mouth daily, Disp: 30 tablet, Rfl: 11     Review of Systems   Constitutional: Negative for appetite change, chills, fatigue, fever and unexpected weight change  HENT: Negative for trouble swallowing  Eyes: Negative for visual disturbance  Respiratory: Negative for cough, chest tightness, shortness of breath and wheezing  Cardiovascular: Negative for chest pain, palpitations and leg swelling  Gastrointestinal: Negative for abdominal distention, abdominal pain, blood in stool, constipation and diarrhea  Endocrine: Negative for polyuria  Genitourinary: Negative for difficulty urinating and flank pain  Musculoskeletal: Negative for arthralgias and myalgias  Skin: Negative for rash  Neurological: Negative for dizziness and light-headedness  Hematological: Negative for adenopathy  Does not bruise/bleed easily  Psychiatric/Behavioral: Negative for dysphoric mood and sleep disturbance  The patient is not nervous/anxious  Objective:      /70 (BP Location: Left arm, Patient Position: Sitting, Cuff Size: Large)   Pulse 72   Resp 18   Ht 5' 11" (1 803 m)   Wt 96 2 kg (212 lb)   SpO2 96%   BMI 29 57 kg/m²          Physical Exam  Vitals reviewed  Constitutional:       Appearance: He is well-developed  HENT:      Head: Normocephalic  Cardiovascular:      Rate and Rhythm: Regular rhythm  Heart sounds: Normal heart sounds  No murmur heard  Pulmonary:      Effort: No respiratory distress  Breath sounds: No wheezing or rales  Abdominal:      General: There is no distension  Tenderness: There is no abdominal tenderness  Musculoskeletal:         General: No swelling or tenderness  Right lower leg: No edema  Left lower leg: No edema  Comments: He has some degenerative changes of his right knee but range of motion remains intact  He has no effusion at this time  Skin:     Findings: No erythema or rash     Neurological:      Mental Status: He is alert and oriented to person, place, and time             Danae Hinojosa MD

## 2022-01-28 NOTE — ASSESSMENT & PLAN NOTE
His right knee with 1 mL of 40 mg/mL Kenalog as well as 2 mL of 1% lidocaine  He tolerated procedure well

## 2022-01-28 NOTE — ASSESSMENT & PLAN NOTE
He appears to have multiple actinic keratoses on his forehead and I am going to have him see Derm at his leisure

## 2022-03-25 ENCOUNTER — APPOINTMENT (OUTPATIENT)
Dept: LAB | Facility: CLINIC | Age: 87
End: 2022-03-25
Payer: COMMERCIAL

## 2022-03-25 DIAGNOSIS — E78.00 HYPERCHOLESTEREMIA: ICD-10-CM

## 2022-03-25 DIAGNOSIS — R17 ELEVATED BILIRUBIN: ICD-10-CM

## 2022-03-25 DIAGNOSIS — I10 ESSENTIAL HYPERTENSION: ICD-10-CM

## 2022-03-25 DIAGNOSIS — D69.6 THROMBOCYTOPENIA (HCC): ICD-10-CM

## 2022-03-25 LAB
ALBUMIN SERPL BCP-MCNC: 3.5 G/DL (ref 3.5–5)
ALP SERPL-CCNC: 83 U/L (ref 46–116)
ALT SERPL W P-5'-P-CCNC: 33 U/L (ref 12–78)
ANION GAP SERPL CALCULATED.3IONS-SCNC: 3 MMOL/L (ref 4–13)
AST SERPL W P-5'-P-CCNC: 24 U/L (ref 5–45)
BASOPHILS # BLD AUTO: 0.03 THOUSANDS/ΜL (ref 0–0.1)
BASOPHILS NFR BLD AUTO: 1 % (ref 0–1)
BILIRUB SERPL-MCNC: 0.75 MG/DL (ref 0.2–1)
BUN SERPL-MCNC: 18 MG/DL (ref 5–25)
CALCIUM SERPL-MCNC: 8.6 MG/DL (ref 8.3–10.1)
CHLORIDE SERPL-SCNC: 109 MMOL/L (ref 100–108)
CO2 SERPL-SCNC: 27 MMOL/L (ref 21–32)
CREAT SERPL-MCNC: 1.17 MG/DL (ref 0.6–1.3)
EOSINOPHIL # BLD AUTO: 0.17 THOUSAND/ΜL (ref 0–0.61)
EOSINOPHIL NFR BLD AUTO: 3 % (ref 0–6)
ERYTHROCYTE [DISTWIDTH] IN BLOOD BY AUTOMATED COUNT: 13.2 % (ref 11.6–15.1)
GFR SERPL CREATININE-BSD FRML MDRD: 56 ML/MIN/1.73SQ M
GLUCOSE P FAST SERPL-MCNC: 112 MG/DL (ref 65–99)
HCT VFR BLD AUTO: 44.7 % (ref 36.5–49.3)
HGB BLD-MCNC: 15.1 G/DL (ref 12–17)
IMM GRANULOCYTES # BLD AUTO: 0.03 THOUSAND/UL (ref 0–0.2)
IMM GRANULOCYTES NFR BLD AUTO: 1 % (ref 0–2)
LYMPHOCYTES # BLD AUTO: 0.9 THOUSANDS/ΜL (ref 0.6–4.47)
LYMPHOCYTES NFR BLD AUTO: 18 % (ref 14–44)
MCH RBC QN AUTO: 33.6 PG (ref 26.8–34.3)
MCHC RBC AUTO-ENTMCNC: 33.8 G/DL (ref 31.4–37.4)
MCV RBC AUTO: 99 FL (ref 82–98)
MONOCYTES # BLD AUTO: 0.48 THOUSAND/ΜL (ref 0.17–1.22)
MONOCYTES NFR BLD AUTO: 10 % (ref 4–12)
NEUTROPHILS # BLD AUTO: 3.39 THOUSANDS/ΜL (ref 1.85–7.62)
NEUTS SEG NFR BLD AUTO: 67 % (ref 43–75)
NRBC BLD AUTO-RTO: 0 /100 WBCS
PLATELET # BLD AUTO: 127 THOUSANDS/UL (ref 149–390)
PMV BLD AUTO: 10.4 FL (ref 8.9–12.7)
POTASSIUM SERPL-SCNC: 4.1 MMOL/L (ref 3.5–5.3)
PROT SERPL-MCNC: 6.5 G/DL (ref 6.4–8.2)
RBC # BLD AUTO: 4.5 MILLION/UL (ref 3.88–5.62)
SODIUM SERPL-SCNC: 139 MMOL/L (ref 136–145)
WBC # BLD AUTO: 5 THOUSAND/UL (ref 4.31–10.16)

## 2022-03-25 PROCEDURE — 85025 COMPLETE CBC W/AUTO DIFF WBC: CPT

## 2022-03-25 PROCEDURE — 36415 COLL VENOUS BLD VENIPUNCTURE: CPT

## 2022-03-25 PROCEDURE — 80053 COMPREHEN METABOLIC PANEL: CPT

## 2022-05-18 ENCOUNTER — OFFICE VISIT (OUTPATIENT)
Dept: SLEEP CENTER | Facility: CLINIC | Age: 87
End: 2022-05-18
Payer: COMMERCIAL

## 2022-05-18 VITALS
BODY MASS INDEX: 41.16 KG/M2 | HEART RATE: 72 BPM | WEIGHT: 218 LBS | SYSTOLIC BLOOD PRESSURE: 120 MMHG | DIASTOLIC BLOOD PRESSURE: 70 MMHG | HEIGHT: 61 IN

## 2022-05-18 DIAGNOSIS — E66.01 CLASS 3 SEVERE OBESITY DUE TO EXCESS CALORIES WITHOUT SERIOUS COMORBIDITY WITH BODY MASS INDEX (BMI) OF 40.0 TO 44.9 IN ADULT (HCC): ICD-10-CM

## 2022-05-18 DIAGNOSIS — Z99.89 OBSTRUCTIVE SLEEP APNEA TREATED WITH CONTINUOUS POSITIVE AIRWAY PRESSURE (CPAP): Primary | ICD-10-CM

## 2022-05-18 DIAGNOSIS — G47.33 OBSTRUCTIVE SLEEP APNEA TREATED WITH CONTINUOUS POSITIVE AIRWAY PRESSURE (CPAP): Primary | ICD-10-CM

## 2022-05-18 PROBLEM — E66.813 CLASS 3 SEVERE OBESITY WITH BODY MASS INDEX (BMI) OF 40.0 TO 44.9 IN ADULT (HCC): Status: ACTIVE | Noted: 2018-03-22

## 2022-05-18 PROCEDURE — 99214 OFFICE O/P EST MOD 30 MIN: CPT | Performed by: NURSE PRACTITIONER

## 2022-05-18 PROCEDURE — 1160F RVW MEDS BY RX/DR IN RCRD: CPT | Performed by: NURSE PRACTITIONER

## 2022-05-18 PROCEDURE — 1036F TOBACCO NON-USER: CPT | Performed by: NURSE PRACTITIONER

## 2022-05-18 PROCEDURE — 3074F SYST BP LT 130 MM HG: CPT | Performed by: NURSE PRACTITIONER

## 2022-05-18 PROCEDURE — 3078F DIAST BP <80 MM HG: CPT | Performed by: NURSE PRACTITIONER

## 2022-05-18 NOTE — PROGRESS NOTES
Progress Note - Oracio [age] y o  male   :1935, MRN: [id number]  2022      Follow Up Evaluation / Problem: Moderate to Severe Obstructive Sleep Apnea  Overweight  HTN  Hx of epilepsy - controlled      Thank you for the opportunity of participating in the evaluation and care of this patient in the Sleep Clinic at [facility]  HPI: David Carrizales is a 81y o  year old male  The patient presents for follow up of obstructive sleep apnea  He has been treated for SHON, using CPAP equipment since   A split night study was planned for re-qualification of equipment, however, respiratory events occurred later in the night during the study, resulting in a diagnostic study only, with AHI of 19 2, worsening to 72 when supine with oxygen guru of 87%  A CPAP titration study was then completed with titration to 9cm  He has been successfully using the equipment since set up in early 2018      Review of Systems      Genitourinary difficulty with erection   Cardiology none   Gastrointestinal none   Neurology none   Constitutional none   Integumentary none   Psychiatry none   Musculoskeletal joint pain and sciatica   Pulmonary none   ENT ringing in ears   Endocrine none   Hematological none       Current Outpatient Medications:     aspirin 81 MG tablet, Take 81 mg by mouth every other day , Disp: , Rfl:     DAILY MULTIPLE VITAMINS tablet, Take 1 tablet by mouth daily, Disp: , Rfl:     furosemide (LASIX) 20 mg tablet, Take 1 tablet (20 mg total) by mouth daily, Disp: 30 tablet, Rfl: 11    levETIRAcetam (KEPPRA) 1000 MG tablet, Take 1 tablet by mouth 2 (two) times a day, Disp: , Rfl:     rosuvastatin (CRESTOR) 5 mg tablet, Take 1 tablet (5 mg total) by mouth daily, Disp: 30 tablet, Rfl: 11    [place] Sleepiness Scale  Sitting and reading: Slight chance of dozing  Watching TV: Slight chance of dozing  Sitting, inactive in a public place (e g  a theatre or a meeting): Would never doze  As a passenger in a car for an hour without a break: Slight chance of dozing  Lying down to rest in the afternoon when circumstances permit: Would never doze  Sitting and talking to someone: Would never doze  Sitting quietly after a lunch without alcohol: Would never doze  In a car, while stopped for a few minutes in traffic: Would never doze  Total score: 3              Vitals:    05/18/22 1111   BP: 120/70   BP Location: Left arm   Patient Position: Sitting   Cuff Size: Large   Pulse: 72   Weight: 98 9 kg (218 lb)   Height: 5' 1 32" (1 558 m)       Body mass index is 40 76 kg/m²  EPWORTH SLEEPINESS SCORE  Total score: 3      Past History Since Last Sleep Center Visit:   He denies any changes to his health since his last visit  He reports that he uses the CPAP equipment every night and sleeps well when using it  He rarely wakes up during the night  He feels rested upon awakening  He denies daytime sleepiness  The patient reports that he cleans the equipment appropriately, using mild soap and water and changes supplies on a regular basis  The patient's CPAP equipment has been recalled  The patient has never used an ozone  to clean the equipment  The patient has not had any symptoms consistent with those associated with the recall            The review of systems and following portions of the patient's history were reviewed and updated as appropriate: allergies, current medications, past family history, past medical history, past social history, past surgical history, and problem list       OBJECTIVE  Equipment set up date:  1/9/2018  PAP Pressure: Nasal CPAP set to deliver 9 cm of water pressure  Type of mask used: nasal  DME Provider: [employer]    Physical Exam:     General Appearance:   Alert, cooperative, no distress, appears stated age, overweight     Head:   Normocephalic, without obvious abnormality, atraumatic     Eyes: PERRL, conjunctiva/corneas clear, EOM's intact          Nose:  Nares normal, septum midline, no drainage or sinus tenderness           Throat:  Lips, teeth and gums normal; tongue normal size and midline mucosa moist and redundant bilaterally, uvula normal, tonsils not visualized, Mallampati class 3       Neck:  Supple, symmetrical, trachea midline, no adenopathy; Thyroid: No enlargement, tenderness or nodules; no carotid bruit or JVD     Lungs:      Clear to auscultation bilaterally, respirations unlabored     Heart:   Regular rate and rhythm, S1 and S2 normal, no murmur, rub or gallop       Extremities:  Extremities normal, atraumatic, no cyanosis or edema       Skin:  Skin color, texture, turgor normal, no rashes or lesions       Neurologic:  No focal deficits noted       ASSESSMENT / PLAN    1  Obstructive sleep apnea treated with continuous positive airway pressure (CPAP)  PAP DME Resupply/Reorder   2  Class 3 severe obesity due to excess calories without serious comorbidity with body mass index (BMI) of 40 0 to 44 9 in adult [facility])         Counseling / Coordination of Care  Total clinic time spent today 30 minutes  Greater than 50% of total time was spent with the patient and / or family counseling and / or coordination of care  A description of the counseling / coordination of care:     Impressions, Diagnostic results, Prognosis, Instructions for management, Risks and benefits of treatment, Patient and family education, Risk factor reductions and Importance of compliance with treatment    Today I reviewed the patient's compliance data  he has been able to use the equipment 100% of all days recorded  Average usage was 4 or more hours 100% of all days recorded  The estimated AHI is 1 6 abnormal breathing events per hour  The patient feels they benefit from the use of PAP equipment and would like to continue PAP therapy  Response to treatment has been excellent    A prescription for supplies has been provided for the next year  We discussed the recent recall of the patient's PAP equipment  The risks and benefits for continued use vs  Discontinuation of PAP therapy were discussed  It is ultimately the patient's decision whether or not to continue PAP therapy at this time  The patient was advised to register their equipment on the [employer], which will give them further direction in the future replacement of the equipment  Since the ozone cleaning devices have been suspected as a causative agent in the recall, the patient has been advised to avoid using any ozone cleaning device and clean the equipment using mild soap and water  He will continue using this equipment at the settings noted above for the next 12 months  At that timehe will then return for a routine follow-up evaluation  I have asked the patient to contact the [facility] if he encounters any difficulties prior to that time  The following instructions have been given to the patient today:    Patient Instructions   1  Continue use of CPAP equipment nightly, at your discretion  2  Continue to clean your equipment, as discussed  3  Contact the [facility] with any questions or concerns prior to your next visit, as needed  4  Schedule visit for follow-up in 1 year    Continuous Positive Airway Pressure (CPAP) therapy was prescribed to you as a medical necessity and there are risks of discontinuing  use of the device, some of which may be long term  Symptoms you experienced before using CPAP may return such as snoring, apneas, excessive daytime sleepiness, hypertension, cardiac arrhythmias, risk of stroke, congestive heart-failure, exacerbation of COPD and potential respiratory failure  Ultimately, it is a personal decision for you to make if you continue use of an affected device or discontinue until a replacement is provided   Unfortunately, [employer] has not yet provided us with information about available devices  Registering your device will allow you to receive up to date information regarding the recall  You can visit their website at [email or web address] to register your device and to learn more about how [employer] plans to replace your device  OR  You can call them to register your device and ask any questions at:  [phone]    It is advised that you do not use any type of ozone  for your PAP equipment  Nursing Support:  When: Monday through Friday 7A-5PM except holidays  Where: Our direct line is [phone]  If you are having a true emergency please call 911  In the event that the line is busy or it is after hours please leave a voice message and we will return your call  Please speak clearly, leaving your full name, birth date, best number to reach you and the reason for your call  Medication refills: We will need the name of the medication, the dosage, the ordering provider, whether you get a 30 or 90 day refill, and the pharmacy name and address  Medications will be ordered by the provider only  Nurses cannot call in prescriptions  Please allow 7 days for medication refills  Physician requested updates: If your provider requested that you call with an update after starting medication, please be ready to provide us the medication and dosage, what time you take your medication, the time you attempt to fall asleep, time you fall asleep, when you wake up, and what time you get out of bed  Sleep Study Results: We will contact you with sleep study results and/or next steps after the physician has reviewed your testing        Herman Pena, [address] [place] [address]

## 2022-05-18 NOTE — PATIENT INSTRUCTIONS
1   Continue use of CPAP equipment nightly, at your discretion  2  Continue to clean your equipment, as discussed  3  Contact the [address] with any questions or concerns prior to your next visit, as needed  4  Schedule visit for follow-up in 1 year    Continuous Positive Airway Pressure (CPAP) therapy was prescribed to you as a medical necessity and there are risks of discontinuing  use of the device, some of which may be long term  Symptoms you experienced before using CPAP may return such as snoring, apneas, excessive daytime sleepiness, hypertension, cardiac arrhythmias, risk of stroke, congestive heart-failure, exacerbation of COPD and potential respiratory failure  Ultimately, it is a personal decision for you to make if you continue use of an affected device or discontinue until a replacement is provided  Unfortunately, [employer] has not yet provided us with information about available devices  Registering your device will allow you to receive up to date information regarding the recall  You can visit their website at [email or web address] to register your device and to learn more about how [employer] plans to replace your device  OR  You can call them to register your device and ask any questions at:  [phone]    It is advised that you do not use any type of ozone  for your PAP equipment  Nursing Support:  When: Monday through Friday 7A-5PM except holidays  Where: Our direct line is [phone]  If you are having a true emergency please call 911  In the event that the line is busy or it is after hours please leave a voice message and we will return your call  Please speak clearly, leaving your full name, birth date, best number to reach you and the reason for your call  Medication refills: We will need the name of the medication, the dosage, the ordering provider, whether you get a 30 or 90 day refill, and the pharmacy name and address    Medications will be ordered by the provider only  Nurses cannot call in prescriptions  Please allow 7 days for medication refills  Physician requested updates: If your provider requested that you call with an update after starting medication, please be ready to provide us the medication and dosage, what time you take your medication, the time you attempt to fall asleep, time you fall asleep, when you wake up, and what time you get out of bed  Sleep Study Results: We will contact you with sleep study results and/or next steps after the physician has reviewed your testing

## 2022-05-27 ENCOUNTER — TELEPHONE (OUTPATIENT)
Dept: SLEEP CENTER | Facility: CLINIC | Age: 87
End: 2022-05-27

## 2022-07-13 ENCOUNTER — APPOINTMENT (OUTPATIENT)
Dept: LAB | Facility: CLINIC | Age: 87
End: 2022-07-13
Payer: COMMERCIAL

## 2022-07-13 DIAGNOSIS — E78.00 HYPERCHOLESTEREMIA: ICD-10-CM

## 2022-07-13 DIAGNOSIS — D69.6 THROMBOCYTOPENIA (HCC): ICD-10-CM

## 2022-07-13 DIAGNOSIS — R17 ELEVATED BILIRUBIN: ICD-10-CM

## 2022-07-13 DIAGNOSIS — I10 ESSENTIAL HYPERTENSION: ICD-10-CM

## 2022-07-13 LAB
ALBUMIN SERPL BCP-MCNC: 3.6 G/DL (ref 3.5–5)
ALP SERPL-CCNC: 77 U/L (ref 46–116)
ALT SERPL W P-5'-P-CCNC: 26 U/L (ref 12–78)
ANION GAP SERPL CALCULATED.3IONS-SCNC: 2 MMOL/L (ref 4–13)
AST SERPL W P-5'-P-CCNC: 18 U/L (ref 5–45)
BASOPHILS # BLD AUTO: 0.03 THOUSANDS/ΜL (ref 0–0.1)
BASOPHILS NFR BLD AUTO: 1 % (ref 0–1)
BILIRUB SERPL-MCNC: 0.83 MG/DL (ref 0.2–1)
BUN SERPL-MCNC: 18 MG/DL (ref 5–25)
CALCIUM SERPL-MCNC: 8.9 MG/DL (ref 8.3–10.1)
CHLORIDE SERPL-SCNC: 108 MMOL/L (ref 100–108)
CHOLEST SERPL-MCNC: 144 MG/DL
CO2 SERPL-SCNC: 29 MMOL/L (ref 21–32)
CREAT SERPL-MCNC: 1.06 MG/DL (ref 0.6–1.3)
EOSINOPHIL # BLD AUTO: 0.13 THOUSAND/ΜL (ref 0–0.61)
EOSINOPHIL NFR BLD AUTO: 3 % (ref 0–6)
ERYTHROCYTE [DISTWIDTH] IN BLOOD BY AUTOMATED COUNT: 13.3 % (ref 11.6–15.1)
GFR SERPL CREATININE-BSD FRML MDRD: 63 ML/MIN/1.73SQ M
GLUCOSE P FAST SERPL-MCNC: 97 MG/DL (ref 65–99)
HCT VFR BLD AUTO: 46.1 % (ref 36.5–49.3)
HDLC SERPL-MCNC: 60 MG/DL
HGB BLD-MCNC: 15.4 G/DL (ref 12–17)
IMM GRANULOCYTES # BLD AUTO: 0.01 THOUSAND/UL (ref 0–0.2)
IMM GRANULOCYTES NFR BLD AUTO: 0 % (ref 0–2)
LDLC SERPL CALC-MCNC: 69 MG/DL (ref 0–100)
LYMPHOCYTES # BLD AUTO: 1.03 THOUSANDS/ΜL (ref 0.6–4.47)
LYMPHOCYTES NFR BLD AUTO: 23 % (ref 14–44)
MCH RBC QN AUTO: 32.9 PG (ref 26.8–34.3)
MCHC RBC AUTO-ENTMCNC: 33.4 G/DL (ref 31.4–37.4)
MCV RBC AUTO: 99 FL (ref 82–98)
MONOCYTES # BLD AUTO: 0.37 THOUSAND/ΜL (ref 0.17–1.22)
MONOCYTES NFR BLD AUTO: 8 % (ref 4–12)
NEUTROPHILS # BLD AUTO: 3 THOUSANDS/ΜL (ref 1.85–7.62)
NEUTS SEG NFR BLD AUTO: 65 % (ref 43–75)
NRBC BLD AUTO-RTO: 0 /100 WBCS
PLATELET # BLD AUTO: 118 THOUSANDS/UL (ref 149–390)
PMV BLD AUTO: 10.4 FL (ref 8.9–12.7)
POTASSIUM SERPL-SCNC: 4.3 MMOL/L (ref 3.5–5.3)
PROT SERPL-MCNC: 6.5 G/DL (ref 6.4–8.2)
RBC # BLD AUTO: 4.68 MILLION/UL (ref 3.88–5.62)
SODIUM SERPL-SCNC: 139 MMOL/L (ref 136–145)
TRIGL SERPL-MCNC: 73 MG/DL
WBC # BLD AUTO: 4.57 THOUSAND/UL (ref 4.31–10.16)

## 2022-07-13 PROCEDURE — 80053 COMPREHEN METABOLIC PANEL: CPT

## 2022-07-13 PROCEDURE — 85025 COMPLETE CBC W/AUTO DIFF WBC: CPT

## 2022-07-13 PROCEDURE — 80061 LIPID PANEL: CPT

## 2022-07-13 PROCEDURE — 36415 COLL VENOUS BLD VENIPUNCTURE: CPT

## 2022-07-29 ENCOUNTER — OFFICE VISIT (OUTPATIENT)
Dept: FAMILY MEDICINE CLINIC | Facility: CLINIC | Age: 87
End: 2022-07-29
Payer: COMMERCIAL

## 2022-07-29 VITALS
RESPIRATION RATE: 18 BRPM | OXYGEN SATURATION: 95 % | SYSTOLIC BLOOD PRESSURE: 120 MMHG | HEART RATE: 61 BPM | BODY MASS INDEX: 39.65 KG/M2 | HEIGHT: 61 IN | TEMPERATURE: 97.2 F | WEIGHT: 210 LBS | DIASTOLIC BLOOD PRESSURE: 80 MMHG

## 2022-07-29 DIAGNOSIS — M17.11 PRIMARY OSTEOARTHRITIS OF RIGHT KNEE: ICD-10-CM

## 2022-07-29 DIAGNOSIS — E78.00 HYPERCHOLESTEREMIA: ICD-10-CM

## 2022-07-29 DIAGNOSIS — G47.33 OBSTRUCTIVE SLEEP APNEA TREATED WITH CONTINUOUS POSITIVE AIRWAY PRESSURE (CPAP): ICD-10-CM

## 2022-07-29 DIAGNOSIS — I10 ESSENTIAL HYPERTENSION: Primary | ICD-10-CM

## 2022-07-29 DIAGNOSIS — Z99.89 OBSTRUCTIVE SLEEP APNEA TREATED WITH CONTINUOUS POSITIVE AIRWAY PRESSURE (CPAP): ICD-10-CM

## 2022-07-29 DIAGNOSIS — M46.1 SI (SACROILIAC) JOINT INFLAMMATION (HCC): ICD-10-CM

## 2022-07-29 DIAGNOSIS — E66.01 CLASS 2 SEVERE OBESITY DUE TO EXCESS CALORIES WITH SERIOUS COMORBIDITY AND BODY MASS INDEX (BMI) OF 39.0 TO 39.9 IN ADULT (HCC): ICD-10-CM

## 2022-07-29 DIAGNOSIS — G40.209 LOCALIZATION-RELATED FOCAL EPILEPSY WITH COMPLEX PARTIAL SEIZURES (HCC): ICD-10-CM

## 2022-07-29 DIAGNOSIS — D69.6 THROMBOCYTOPENIA (HCC): ICD-10-CM

## 2022-07-29 PROBLEM — E66.812 CLASS 2 SEVERE OBESITY DUE TO EXCESS CALORIES WITH SERIOUS COMORBIDITY AND BODY MASS INDEX (BMI) OF 39.0 TO 39.9 IN ADULT (HCC): Status: ACTIVE | Noted: 2018-03-22

## 2022-07-29 PROCEDURE — 99214 OFFICE O/P EST MOD 30 MIN: CPT | Performed by: FAMILY MEDICINE

## 2022-07-29 PROCEDURE — G0439 PPPS, SUBSEQ VISIT: HCPCS | Performed by: FAMILY MEDICINE

## 2022-07-29 NOTE — ASSESSMENT & PLAN NOTE
He is having some ongoing knee pain  I injected him back in January but he did not really have prolonged relief with a Kenalog injection  I am going to refer him to Orthopedics

## 2022-07-29 NOTE — PROGRESS NOTES
Assessment and Plan:     Problem List Items Addressed This Visit        Respiratory    Obstructive sleep apnea treated with continuous positive airway pressure (CPAP)       Cardiovascular and Mediastinum    Essential hypertension - Primary    Relevant Orders    CBC and differential    Comprehensive metabolic panel       Nervous and Auditory    Localization-related focal epilepsy with complex partial seizures ([address] )     Continue current regimen  He is quite stable  Musculoskeletal and Integument    SI (sacroiliac) joint inflammation (HCC)     SI joint discomfort is stable at this time  He continues with routine exercises         Primary osteoarthritis of right knee     He is having some ongoing knee pain  I injected him back in January but he did not really have prolonged relief with a Kenalog injection  I am going to refer him to Orthopedics  Relevant Orders    Ambulatory referral to Orthopedic Surgery       Other    Class 2 severe obesity due to excess calories with serious comorbidity and body mass index (BMI) of 39 0 to 39 9 in [facility])    Hypercholesteremia    Relevant Orders    Comprehensive metabolic panel    Thrombocytopenia (HCC)     Platelets remain stable  Hold on Hematology evaluation at this time  Continue routine monitoring  Relevant Orders    CBC and differential           Preventive health issues were discussed with patient, and age appropriate screening tests were ordered as noted in patient's After Visit Summary  Personalized health advice and appropriate referrals for health education or preventive services given if needed, as noted in patient's After Visit Summary  History of Present Illness:     Patient presents for a Medicare Wellness Visit    HPI patient presents today for Medicare wellness visit as well as a follow-up for routine health issues  Fortunately, he continues do quite follow-up  He remains quite active with golfing    He has had no cardiovascular limitations to exertion  He does have some chronic mild lower extremity swelling  He takes Lasix most days of the week but will skip it on a day he is golfing due to polyuria  He has history of seizure disorder which has been quite stable with Keppra  He remains compliant with his CPAP  Patient Care Team:  Anniece Skiff , MD as PCP - Elaine Lozano MD as PCP - [address] [place], [place] [address] [facility]     Review of Systems:     Review of Systems   Constitutional: Negative for appetite change, chills, fatigue, fever and unexpected weight change  HENT: Negative for trouble swallowing  Eyes: Negative for visual disturbance  Respiratory: Negative for cough, chest tightness, shortness of breath and wheezing  Cardiovascular: Negative for chest pain, palpitations and leg swelling  Gastrointestinal: Negative for abdominal distention, abdominal pain, blood in stool, constipation and diarrhea  Endocrine: Negative for polyuria  Genitourinary: Negative for difficulty urinating and flank pain  Musculoskeletal: Positive for arthralgias (Knee pain)  Negative for myalgias  Skin: Negative for rash  Neurological: Negative for dizziness and light-headedness  Hematological: Negative for adenopathy  Does not bruise/bleed easily  Psychiatric/Behavioral: Negative for dysphoric mood and sleep disturbance  The patient is not nervous/anxious           Problem List:     Patient Active Problem List   Diagnosis    Obstructive sleep apnea treated with continuous positive airway pressure (CPAP)    Class 2 severe obesity due to excess calories with serious comorbidity and body mass index (BMI) of 39 0 to 39 9 in adult [facility])    History of renal stone    Cavernoma    Hypercholesteremia    Localization-related focal epilepsy with complex partial seizures ([address] )    Essential hypertension    Thrombocytopenia (HCC)    SI (sacroiliac) joint inflammation (HCC)    Primary osteoarthritis of right knee    AK (actinic keratosis)      Past Medical and Surgical History:     Past Medical History:   Diagnosis Date    Convulsions, epileptic ([facility] [address] ) 5/3/2012    Description: Onset 1988  Absence seizures in 2006 when switching from dilantin to keppra    DVT (deep venous thrombosis) (HCC)     Occurred after fall with fracture patella    Hyperlipidemia     Hypertension     Seizures (HCC)      Past Surgical History:   Procedure Laterality Date    BLEPHAROPLASTY      last assessed: 2017; upper lid w/ excessive skin     COLONOSCOPY W/ POLYPECTOMY      MOUTH SURGERY      gingival incision due to Dilantin therapy       Family History:     Family History   Problem Relation Age of Onset    Stroke Family         syndrome      Social History:     Social History     Socioeconomic History    Marital status: /Civil Union     Spouse name: None    Number of children: None    Years of education: None    Highest education level: None   Occupational History    None   Tobacco Use    Smoking status: Former Smoker     Packs/day: 1 00     Years: 10 00     Pack years: 10 00     Types: Cigarettes     Quit date:      Years since quittin 6    Smokeless tobacco: Never Used    Tobacco comment: Quit in the    Vaping Use    Vaping Use: Never used   Substance and Sexual Activity    Alcohol use:  Yes     Alcohol/week: 5 0 standard drinks     Types: 5 Standard drinks or equivalent per week     Comment: Social     Drug use: No    Sexual activity: Not Currently   Other Topics Concern    None   Social History Narrative    Consumes 2-3 cups of coffee per day     Social Determinants of Health     Financial Resource Strain: Not on file   Food Insecurity: Not on file   Transportation Needs: Not on file   Physical Activity: Not on file   Stress: Not on file   Social Connections: Not on file   Intimate Partner Violence: Not on file   Housing Stability: Not on file      Medications and Allergies: Current Outpatient Medications   Medication Sig Dispense Refill    DAILY MULTIPLE VITAMINS tablet Take 1 tablet by mouth daily      furosemide (LASIX) 20 mg tablet Take 1 tablet (20 mg total) by mouth daily 30 tablet 11    levETIRAcetam (KEPPRA) 1000 MG tablet Take 1 tablet by mouth 2 (two) times a day      rosuvastatin (CRESTOR) 5 mg tablet Take 1 tablet (5 mg total) by mouth daily 30 tablet 11     No current facility-administered medications for this visit  No Known Allergies   Immunizations:     Immunization History   Administered Date(s) Administered    COVID-19 MODERNA VACC 0 5 ML IM 01/28/2021, 02/25/2021, 10/25/2021, 04/25/2022    INFLUENZA 10/10/2008, 10/18/2014, 10/10/2015, 09/15/2016, 10/02/2017, 10/08/2021    Influenza Split High Dose Preservative Free IM 10/18/2012, 10/04/2013, 10/18/2014, 10/10/2015, 09/15/2016, 10/02/2017    Influenza, high dose seasonal 0 7 mL 10/03/2018, 10/10/2019, 10/15/2020    Influenza, seasonal, injectable 10/10/2011    Pneumococcal Conjugate 13-Valent 03/25/2015    Pneumococcal Polysaccharide PPV23 10/18/2012    Td (adult), adsorbed 07/01/2004    Tdap 10/03/2016    Zoster 02/25/2010    Zoster Vaccine Recombinant 03/18/2019, 06/12/2019      Health Maintenance: There are no preventive care reminders to display for this patient  Topic Date Due    Influenza Vaccine (1) 09/01/2022      Medicare Screening Tests and Risk Assessments:     Kam Lopez is here for his Subsequent Wellness visit  Health Risk Assessment:   Patient rates overall health as good  Patient feels that their physical health rating is same  Patient is satisfied with their life  Eyesight was rated as same  Hearing was rated as same  Patient feels that their emotional and mental health rating is same  Patients states they are never, rarely angry  Patient states they are never, rarely unusually tired/fatigued  Pain experienced in the last 7 days has been some   Patient's pain rating has been 5/10  Patient states that he has experienced no weight loss or gain in last 6 months  Fall Risk Screening: In the past year, patient has experienced: no history of falling in past year      Home Safety:  Patient does not have trouble with stairs inside or outside of their home  Patient has working smoke alarms and has no working carbon monoxide detector  Home safety hazards include: none  Nutrition:   Current diet is Regular  Medications:   Patient is currently taking over-the-counter supplements  OTC medications include: see medication list  Patient is able to manage medications  Activities of Daily Living (ADLs)/Instrumental Activities of Daily Living (IADLs):   Walk and transfer into and out of bed and chair?: Yes  Dress and groom yourself?: Yes    Bathe or shower yourself?: Yes    Feed yourself? Yes  Do your laundry/housekeeping?: Yes  Manage your money, pay your bills and track your expenses?: Yes  Make your own meals?: Yes    Do your own shopping?: Yes    Previous Hospitalizations:   Any hospitalizations or ED visits within the last 12 months?: No      Advance Care Planning:   Living will: Yes    Durable POA for healthcare:  Yes    Advanced directive: Yes    End of Life Decisions reviewed with patient: Yes      Cognitive Screening:   Provider or family/friend/caregiver concerned regarding cognition?: No    PREVENTIVE SCREENINGS      Cardiovascular Screening:    General: Screening Not Indicated and History Lipid Disorder      Diabetes Screening:     General: Screening Current      Colorectal Cancer Screening:     General: Screening Not Indicated      Prostate Cancer Screening:    General: Screening Not Indicated      Osteoporosis Screening:    General: Screening Not Indicated      Abdominal Aortic Aneurysm (AAA) Screening:    Risk factors include: tobacco use        General: Screening Not Indicated      Lung Cancer Screening:     General: Screening Not Indicated      Hepatitis C Screening:    General: Screening Not Indicated    Screening, Brief Intervention, and Referral to Treatment (SBIRT)    Screening  Typical number of drinks in a day: 1  Typical number of drinks in a week: 7  Interpretation: Low risk drinking behavior  Single Item Drug Screening:  How often have you used an illegal drug (including marijuana) or a prescription medication for non-medical reasons in the past year? never    Single Item Drug Screen Score: 0  Interpretation: Negative screen for possible drug use disorder    No exam data present     Physical Exam:     /80 (BP Location: Left arm, Patient Position: Sitting, Cuff Size: Large)   Pulse 61   Temp (!) 97 2 °F (36 2 °C) (Temporal)   Resp 18   Ht 5' 1 32" (1 558 m)   Wt 95 3 kg (210 lb)   SpO2 95%   BMI 39 27 kg/m²     Physical Exam  Constitutional:       General: He is not in acute distress  Appearance: Normal appearance  He is well-developed  He is not diaphoretic  HENT:      Head: Normocephalic  Right Ear: External ear normal       Left Ear: External ear normal       Nose: Nose normal    Eyes:      General:         Right eye: No discharge  Left eye: No discharge  Conjunctiva/sclera: Conjunctivae normal       Pupils: Pupils are equal, round, and reactive to light  Neck:      Thyroid: No thyromegaly  Trachea: No tracheal deviation  Cardiovascular:      Rate and Rhythm: Normal rate and regular rhythm  Heart sounds: Normal heart sounds  No murmur heard  No friction rub  Pulmonary:      Effort: Pulmonary effort is normal  No respiratory distress  Breath sounds: Normal breath sounds  No wheezing  Chest:      Chest wall: No tenderness  Abdominal:      General: There is no distension  Palpations: There is no mass  Tenderness: There is no abdominal tenderness  There is no guarding or rebound  Hernia: No hernia is present  Musculoskeletal:         General: No swelling or deformity  Cervical back: Normal range of motion  Right lower leg: Edema present  Left lower leg: Edema (1+ b/l) present  Skin:     Findings: No erythema or rash  Neurological:      General: No focal deficit present  Mental Status: He is alert  Cranial Nerves: No cranial nerve deficit  Coordination: Coordination normal    Psychiatric:         Thought Content:  Thought content normal           Clari Curiel MD

## 2022-12-28 DIAGNOSIS — I10 ESSENTIAL HYPERTENSION: ICD-10-CM

## 2022-12-28 RX ORDER — FUROSEMIDE 20 MG/1
TABLET ORAL
Qty: 30 TABLET | Refills: 11 | Status: SHIPPED | OUTPATIENT
Start: 2022-12-28

## 2023-01-05 DIAGNOSIS — E78.00 HYPERCHOLESTEREMIA: ICD-10-CM

## 2023-01-05 RX ORDER — ROSUVASTATIN CALCIUM 5 MG/1
TABLET, COATED ORAL
Qty: 90 TABLET | Refills: 3 | Status: SHIPPED | OUTPATIENT
Start: 2023-01-05

## 2023-01-25 ENCOUNTER — LAB (OUTPATIENT)
Dept: LAB | Facility: CLINIC | Age: 88
End: 2023-01-25

## 2023-01-25 DIAGNOSIS — I10 ESSENTIAL HYPERTENSION: ICD-10-CM

## 2023-01-25 DIAGNOSIS — D69.6 THROMBOCYTOPENIA (HCC): ICD-10-CM

## 2023-01-25 DIAGNOSIS — E78.00 HYPERCHOLESTEREMIA: ICD-10-CM

## 2023-01-25 DIAGNOSIS — G40.209 LOCALIZATION-RELATED FOCAL EPILEPSY WITH COMPLEX PARTIAL SEIZURES (HCC): ICD-10-CM

## 2023-01-25 LAB
ALBUMIN SERPL BCP-MCNC: 3.4 G/DL (ref 3.5–5)
ALP SERPL-CCNC: 108 U/L (ref 46–116)
ALT SERPL W P-5'-P-CCNC: 27 U/L (ref 12–78)
ANION GAP SERPL CALCULATED.3IONS-SCNC: 2 MMOL/L (ref 4–13)
AST SERPL W P-5'-P-CCNC: 17 U/L (ref 5–45)
BASOPHILS # BLD AUTO: 0.03 THOUSANDS/ÂΜL (ref 0–0.1)
BASOPHILS NFR BLD AUTO: 1 % (ref 0–1)
BILIRUB SERPL-MCNC: 0.76 MG/DL (ref 0.2–1)
BUN SERPL-MCNC: 21 MG/DL (ref 5–25)
CALCIUM ALBUM COR SERPL-MCNC: 9.9 MG/DL (ref 8.3–10.1)
CALCIUM SERPL-MCNC: 9.4 MG/DL (ref 8.3–10.1)
CHLORIDE SERPL-SCNC: 110 MMOL/L (ref 96–108)
CO2 SERPL-SCNC: 29 MMOL/L (ref 21–32)
CREAT SERPL-MCNC: 1.03 MG/DL (ref 0.6–1.3)
EOSINOPHIL # BLD AUTO: 0.22 THOUSAND/ÂΜL (ref 0–0.61)
EOSINOPHIL NFR BLD AUTO: 4 % (ref 0–6)
ERYTHROCYTE [DISTWIDTH] IN BLOOD BY AUTOMATED COUNT: 12.9 % (ref 11.6–15.1)
GFR SERPL CREATININE-BSD FRML MDRD: 65 ML/MIN/1.73SQ M
GLUCOSE P FAST SERPL-MCNC: 98 MG/DL (ref 65–99)
HCT VFR BLD AUTO: 45.2 % (ref 36.5–49.3)
HGB BLD-MCNC: 14.9 G/DL (ref 12–17)
IMM GRANULOCYTES # BLD AUTO: 0.02 THOUSAND/UL (ref 0–0.2)
IMM GRANULOCYTES NFR BLD AUTO: 0 % (ref 0–2)
LYMPHOCYTES # BLD AUTO: 1.03 THOUSANDS/ÂΜL (ref 0.6–4.47)
LYMPHOCYTES NFR BLD AUTO: 18 % (ref 14–44)
MCH RBC QN AUTO: 32.8 PG (ref 26.8–34.3)
MCHC RBC AUTO-ENTMCNC: 33 G/DL (ref 31.4–37.4)
MCV RBC AUTO: 100 FL (ref 82–98)
MONOCYTES # BLD AUTO: 0.48 THOUSAND/ÂΜL (ref 0.17–1.22)
MONOCYTES NFR BLD AUTO: 9 % (ref 4–12)
NEUTROPHILS # BLD AUTO: 3.88 THOUSANDS/ÂΜL (ref 1.85–7.62)
NEUTS SEG NFR BLD AUTO: 68 % (ref 43–75)
NRBC BLD AUTO-RTO: 0 /100 WBCS
PLATELET # BLD AUTO: 173 THOUSANDS/UL (ref 149–390)
PMV BLD AUTO: 10 FL (ref 8.9–12.7)
POTASSIUM SERPL-SCNC: 4.7 MMOL/L (ref 3.5–5.3)
PROT SERPL-MCNC: 6.8 G/DL (ref 6.4–8.4)
RBC # BLD AUTO: 4.54 MILLION/UL (ref 3.88–5.62)
SODIUM SERPL-SCNC: 141 MMOL/L (ref 135–147)
WBC # BLD AUTO: 5.66 THOUSAND/UL (ref 4.31–10.16)

## 2023-01-27 LAB — LEVETIRACETAM SERPL-MCNC: 17.5 UG/ML (ref 10–40)

## 2023-02-01 ENCOUNTER — OFFICE VISIT (OUTPATIENT)
Dept: FAMILY MEDICINE CLINIC | Facility: CLINIC | Age: 88
End: 2023-02-01

## 2023-02-01 VITALS
SYSTOLIC BLOOD PRESSURE: 134 MMHG | OXYGEN SATURATION: 98 % | DIASTOLIC BLOOD PRESSURE: 76 MMHG | HEART RATE: 58 BPM | HEIGHT: 71 IN | BODY MASS INDEX: 29.82 KG/M2 | TEMPERATURE: 97.8 F | WEIGHT: 213 LBS

## 2023-02-01 DIAGNOSIS — D69.6 THROMBOCYTOPENIA (HCC): ICD-10-CM

## 2023-02-01 DIAGNOSIS — M17.11 PRIMARY OSTEOARTHRITIS OF RIGHT KNEE: ICD-10-CM

## 2023-02-01 DIAGNOSIS — E78.00 HYPERCHOLESTEREMIA: ICD-10-CM

## 2023-02-01 DIAGNOSIS — L57.0 AK (ACTINIC KERATOSIS): ICD-10-CM

## 2023-02-01 DIAGNOSIS — R73.09 ELEVATED GLUCOSE: ICD-10-CM

## 2023-02-01 DIAGNOSIS — G40.209 LOCALIZATION-RELATED FOCAL EPILEPSY WITH COMPLEX PARTIAL SEIZURES (HCC): ICD-10-CM

## 2023-02-01 DIAGNOSIS — I10 ESSENTIAL HYPERTENSION: Primary | ICD-10-CM

## 2023-02-01 DIAGNOSIS — M46.1 SI (SACROILIAC) JOINT INFLAMMATION (HCC): ICD-10-CM

## 2023-02-01 DIAGNOSIS — G47.33 OBSTRUCTIVE SLEEP APNEA TREATED WITH CONTINUOUS POSITIVE AIRWAY PRESSURE (CPAP): ICD-10-CM

## 2023-02-01 DIAGNOSIS — D18.00 CAVERNOMA: ICD-10-CM

## 2023-02-01 DIAGNOSIS — Z99.89 OBSTRUCTIVE SLEEP APNEA TREATED WITH CONTINUOUS POSITIVE AIRWAY PRESSURE (CPAP): ICD-10-CM

## 2023-02-01 PROBLEM — E66.812 CLASS 2 SEVERE OBESITY DUE TO EXCESS CALORIES WITH SERIOUS COMORBIDITY AND BODY MASS INDEX (BMI) OF 39.0 TO 39.9 IN ADULT (HCC): Status: RESOLVED | Noted: 2018-03-22 | Resolved: 2023-02-01

## 2023-02-01 PROBLEM — E66.01 CLASS 2 SEVERE OBESITY DUE TO EXCESS CALORIES WITH SERIOUS COMORBIDITY AND BODY MASS INDEX (BMI) OF 39.0 TO 39.9 IN ADULT (HCC): Status: RESOLVED | Noted: 2018-03-22 | Resolved: 2023-02-01

## 2023-02-01 NOTE — PROGRESS NOTES
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)       Musculoskeletal and Integument    SI (sacroiliac) joint inflammation (HCC)     This improved significantly after physical therapy  Continue to monitor  Primary osteoarthritis of right knee    Relevant Orders    Ambulatory Referral to Orthopedic Surgery    AK (actinic keratosis)     He is now following with dermatology  Hematopoietic and Hemostatic    Thrombocytopenia (HCC)    Relevant Orders    Comprehensive metabolic panel    CBC and differential       Other    Cavernoma    Hypercholesteremia    Relevant Orders    Comprehensive metabolic panel    Lipid Panel with Direct LDL reflex   Other Visit Diagnoses     Elevated glucose        Relevant Orders    Hemoglobin A1C            Subjective:      Patient ID: Joanne Wood is a 81 y o  male  HPI patient presents today for follow-up for chronic health issues  He notes he is doing well  He remains active but is not really exercising due to significant right knee pain with prolonged ambulation  Is a history of some mild hypertension as well as some mild lower extremity swelling  He remains on furosemide 20 mg daily which he believes has been quite effective  He has a history of seizure disorder and follows routinely with neurology  He remains compliant with Keppra      The following portions of the patient's history were reviewed and updated as appropriate: allergies, current medications, past family history, past medical history, past social history, past surgical history and problem list       Current Outpatient Medications:   • DAILY MULTIPLE VITAMINS tablet, Take 1 tablet by mouth daily, Disp: , Rfl:   •  furosemide (LASIX) 20 mg tablet, TAKE 1 TABLET BY MOUTH EVERY DAY, Disp: 30 tablet, Rfl: 11  •  levETIRAcetam (KEPPRA) 1000 MG tablet, Take 1 tablet by mouth 2 (two) times a day, Disp: , Rfl:   •  rosuvastatin (CRESTOR) 5 mg tablet, TAKE 1 TABLET BY MOUTH EVERY DAY, Disp: 90 tablet, Rfl: 3     Review of Systems   Constitutional: Negative for appetite change, chills, fatigue, fever and unexpected weight change  HENT: Negative for trouble swallowing  Eyes: Negative for visual disturbance  Respiratory: Negative for cough, chest tightness, shortness of breath and wheezing  Cardiovascular: Negative for chest pain, palpitations and leg swelling  Gastrointestinal: Negative for abdominal distention, abdominal pain, blood in stool, constipation and diarrhea  Endocrine: Negative for polyuria  Genitourinary: Negative for difficulty urinating and flank pain  Musculoskeletal: Negative for arthralgias and myalgias  Skin: Negative for rash  Neurological: Negative for dizziness and light-headedness  Hematological: Negative for adenopathy  Does not bruise/bleed easily  Psychiatric/Behavioral: Negative for dysphoric mood and sleep disturbance  The patient is not nervous/anxious  Objective:      /76 (BP Location: Left arm, Patient Position: Sitting, Cuff Size: Large)   Pulse 58   Temp 97 8 °F (36 6 °C)   Ht 5' 11" (1 803 m)   Wt 96 6 kg (213 lb)   SpO2 98%   BMI 29 71 kg/m²          Physical Exam  Vitals reviewed  Constitutional:       Appearance: He is well-developed  HENT:      Head: Normocephalic  Cardiovascular:      Rate and Rhythm: Regular rhythm  Heart sounds: Normal heart sounds  No murmur heard  Pulmonary:      Effort: No respiratory distress  Breath sounds: No wheezing or rales  Abdominal:      General: There is no distension  Tenderness: There is no abdominal tenderness  Musculoskeletal:         General: No swelling  Right lower leg: No edema  Left lower leg: No edema  Skin:     Findings: No erythema or rash  Neurological:      Mental Status: He is alert and oriented to person, place, and time             Mary Michele MD

## 2023-03-01 ENCOUNTER — APPOINTMENT (OUTPATIENT)
Dept: RADIOLOGY | Facility: MEDICAL CENTER | Age: 88
End: 2023-03-01

## 2023-03-01 ENCOUNTER — OFFICE VISIT (OUTPATIENT)
Dept: OBGYN CLINIC | Facility: MEDICAL CENTER | Age: 88
End: 2023-03-01

## 2023-03-01 VITALS
DIASTOLIC BLOOD PRESSURE: 78 MMHG | WEIGHT: 214.4 LBS | HEART RATE: 67 BPM | SYSTOLIC BLOOD PRESSURE: 155 MMHG | HEIGHT: 71 IN | BODY MASS INDEX: 30.02 KG/M2

## 2023-03-01 DIAGNOSIS — M25.561 RIGHT KNEE PAIN, UNSPECIFIED CHRONICITY: ICD-10-CM

## 2023-03-01 DIAGNOSIS — Z01.89 ENCOUNTER FOR LOWER EXTREMITY COMPARISON IMAGING STUDY: ICD-10-CM

## 2023-03-01 DIAGNOSIS — M17.11 PRIMARY OSTEOARTHRITIS OF RIGHT KNEE: Primary | ICD-10-CM

## 2023-03-01 RX ORDER — TRIAMCINOLONE ACETONIDE 40 MG/ML
40 INJECTION, SUSPENSION INTRA-ARTICULAR; INTRAMUSCULAR
Status: COMPLETED | OUTPATIENT
Start: 2023-03-01 | End: 2023-03-01

## 2023-03-01 RX ORDER — BUPIVACAINE HYDROCHLORIDE 2.5 MG/ML
2 INJECTION, SOLUTION INFILTRATION; PERINEURAL
Status: COMPLETED | OUTPATIENT
Start: 2023-03-01 | End: 2023-03-01

## 2023-03-01 RX ADMIN — BUPIVACAINE HYDROCHLORIDE 2 ML: 2.5 INJECTION, SOLUTION INFILTRATION; PERINEURAL at 09:42

## 2023-03-01 RX ADMIN — TRIAMCINOLONE ACETONIDE 40 MG: 40 INJECTION, SUSPENSION INTRA-ARTICULAR; INTRAMUSCULAR at 09:42

## 2023-03-01 NOTE — LETTER
March 1, 2023     Hai Brink MD  [address] [facility] [place]  [address]  [place] [address]    Patient: Ashley Suarez   YOB: 1935   Date of Visit: 3/1/2023       Dear Dr Vangie Quinonez: Thank you for referring Ashley Suarez to me for evaluation  Below are my notes for this consultation  If you have questions, please do not hesitate to call me  I look forward to following your patient along with you  Sincerely,        Shawn Mixon DO        CC: No Recipients  Moni lemus DO  3/1/2023 10:03 AM  Sign when Signing Visit   Assessment/Plan     1  Primary osteoarthritis of right knee    2  Right knee pain, unspecified chronicity    3  Encounter for lower extremity comparison imaging study      Orders Placed This Encounter   Procedures   • Large joint arthrocentesis: R knee   • XR knee 4+ vw right injury   • XR knee 1 or 2 vw left   • Ambulatory Referral to Physical Therapy     • Patient presents with severe right knee osteoarthritis  • Discussed conservative treatment as well as risks and benefits of these treatment options  • After a discussion of risks and benefits the patient elected to proceed with a right knee steroid injection today  Patient should ice and avoid strenuous activity for 1-2 days if needed  Patient should avoid vaccines for 2 weeks if possible  If patient is diabetic should also monitor glucose over the next 7 to 10 days  • PT script placed  • Home exercises provided today for back and hip  • Add in Tylenol as needed for pain  1,000 mg up to 3 times a day  Do not exceed 3,000 mg per day  · Advised him to call in 2 weeks if the injection provided today did not provide relief and would like visco injections ordered, to be administered in 2 months  Return in about 3 months (around 6/1/2023) for Recheck  I answered all of the patient's questions during the visit and provided education of the patient's condition during the visit    The patient verbalized understanding of the information given and agrees with the plan  This note was dictated using KIDOZ software  It may contain errors including improperly dictated words  Please contact physician directly for any questions  History of Present Illness   Chief complaint:   Chief Complaint   Patient presents with   • Right Knee - Pain       HPI: Faith Camejo is a 81 y o  male that c/o right knee pain  Pain has been present for 2 years, located medial, and described as dull and intermmitent  Patient denies any previous injuries or surgeries  Pain is aggravated  by walking and stairs, and he denies any instability  Patient denies any radiating pain or distal paresthesias  Pain is alleviated by Advil taken   Patient has not initiated PT or bracing for the left knee, but has received 1 CSI a little over 1 year ago with his PCP with only 1 week of relief  He also reports recent onset of right-sided lower back pain, previously treated with PT, and hip pain above the greater trochanter  ROS:    See HPI for musculoskeletal review  All other systems reviewed are negative     Historical Information   Past Medical History:   Diagnosis Date   • Class 2 severe obesity due to excess calories with serious comorbidity and body mass index (BMI) of 39 0 to 39 9 in adult [facility]) 3/22/2018   • Convulsions, 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[facility] [address]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/2012    Description: Onset 1988    Absence seizures in 2006 when switching from dilantin to keppra   • DVT (deep venous thrombosis) ([facility] [address] )     Occurred after fall with fracture patella   • Hyperlipidemia    • Hypertension    • Seizures [facility])      Past Surgical History:   Procedure Laterality Date   • BLEPHAROPLASTY      last assessed: 03/24/2017; upper lid w/ excessive skin    • COLONOSCOPY W/ POLYPECTOMY     • MOUTH SURGERY      gingival incision due to Dilantin therapy      Social History   Social History     Substance and Sexual Activity   Alcohol Use Yes   • Alcohol/week: 5 0 standard drinks • Types: 5 Standard drinks or equivalent per week    Comment: Social      Social History     Substance and Sexual Activity   Drug Use No     Social History     Tobacco Use   Smoking Status Former   • Packs/day: 1 00   • Years: 10 00   • Pack years: 10 00   • Types: Cigarettes   • Quit date: 56   • Years since quittin 2   Smokeless Tobacco Never   Tobacco Comments    Quit in the s     Family History:   Family History   Problem Relation Age of Onset   • No Known Problems Mother    • No Known Problems Father    • Stroke Family         syndrome       Current Outpatient Medications on File Prior to Visit   Medication Sig Dispense Refill   • DAILY MULTIPLE VITAMINS tablet Take 1 tablet by mouth daily     • furosemide (LASIX) 20 mg tablet TAKE 1 TABLET BY MOUTH EVERY DAY 30 tablet 11   • levETIRAcetam (KEPPRA) 1000 MG tablet Take 1 tablet by mouth 2 (two) times a day     • rosuvastatin (CRESTOR) 5 mg tablet TAKE 1 TABLET BY MOUTH EVERY DAY 90 tablet 3     No current facility-administered medications on file prior to visit  No Known Allergies    Current Outpatient Medications on File Prior to Visit   Medication Sig Dispense Refill   • DAILY MULTIPLE VITAMINS tablet Take 1 tablet by mouth daily     • furosemide (LASIX) 20 mg tablet TAKE 1 TABLET BY MOUTH EVERY DAY 30 tablet 11   • levETIRAcetam (KEPPRA) 1000 MG tablet Take 1 tablet by mouth 2 (two) times a day     • rosuvastatin (CRESTOR) 5 mg tablet TAKE 1 TABLET BY MOUTH EVERY DAY 90 tablet 3     No current facility-administered medications on file prior to visit  Objective    Vitals: Blood pressure 155/78, pulse 67, height 5' 11" (1 803 m), weight 97 3 kg (214 lb 6 4 oz)  ,Body mass index is 29 9 kg/m²      PE:  AAOx 3  WDWN  Hearing intact, no drainage from eyes  Regular rate  no audible wheezing  no abdominal distension  LE compartments soft, skin intact    rightknee:    Appearance:  Mild generalized swelling   No ecchymosis  no obvious joint deformity   No effusion  Palpation/Tenderness:  No TTP over medial joint line  No TTP over lateral joint line   No TTP over patella  No TTP over patellar tendon  No TTP over pes anserine bursa  Active Range of Motion:  AROM: 2-120  PROM: 2-130  Special Tests:  Patellar grind:  Negative  Valgus Stress Test:  negative  Varus Stress Test:  negative     No ipsilateral hip pain with ROM    rightLE:    Sensation grossly intact  AT/GS/EHL intact    RLE:  EHL/AT/GS/quads/hamstrings/iliopsoas 5/5, sensation grossly intact L4, L5, S1, palpable pedal pulse  LLE:  EHL/AT/GS/quads/hamstrings/iliopsoas 5/5, sensation grossly intact L4, L5, S1, palpable pedal pulse      Imaging Studies: I have personally reviewed pertinent films in PACS  XR rightknee:    Severe arthritis with joint space narrowing and osteophyte formation        Large joint arthrocentesis: R knee  Universal Protocol:  Consent: Verbal consent obtained    Risks and benefits: risks, benefits and alternatives were discussed  Consent given by: patient  Patient understanding: patient states understanding of the procedure being performed  Patient consent: the patient's understanding of the procedure matches consent given    Supporting Documentation  Indications: pain   Procedure Details  Location: knee - R knee  Needle size: 22 G  Ultrasound guidance: no  Approach: anterolateral  Medications administered: 40 mg triamcinolone acetonide 40 mg/mL; 2 mL bupivacaine 0 25 %    Patient tolerance: patient tolerated the procedure well with no immediate complications  Dressing:  Sterile dressing applied

## 2023-03-01 NOTE — PATIENT INSTRUCTIONS
Call in 2 weeks if today's steroid injection did not provide pain relief and you would like visco/gel injections ordered, to be administered in 2 months from today

## 2023-03-01 NOTE — PROGRESS NOTES
Assessment/Plan     1  Primary osteoarthritis of right knee    2  Right knee pain, unspecified chronicity    3  Encounter for lower extremity comparison imaging study      Orders Placed This Encounter   Procedures   • Large joint arthrocentesis: R knee   • XR knee 4+ vw right injury   • XR knee 1 or 2 vw left   • Ambulatory Referral to Physical Therapy     • Patient presents with severe right knee osteoarthritis  • Discussed conservative treatment as well as risks and benefits of these treatment options  • After a discussion of risks and benefits the patient elected to proceed with a right knee steroid injection today  Patient should ice and avoid strenuous activity for 1-2 days if needed  Patient should avoid vaccines for 2 weeks if possible  If patient is diabetic should also monitor glucose over the next 7 to 10 days  • PT script placed  • Home exercises provided today for back and hip  • Add in Tylenol as needed for pain  1,000 mg up to 3 times a day  Do not exceed 3,000 mg per day  · Advised him to call in 2 weeks if the injection provided today did not provide relief and would like visco injections ordered, to be administered in 2 months  Return in about 3 months (around 6/1/2023) for Recheck  I answered all of the patient's questions during the visit and provided education of the patient's condition during the visit  The patient verbalized understanding of the information given and agrees with the plan  This note was dictated using Livestation software  It may contain errors including improperly dictated words  Please contact physician directly for any questions  History of Present Illness   Chief complaint:   Chief Complaint   Patient presents with   • Right Knee - Pain       HPI: Vianca Edge is a 81 y o  male that c/o right knee pain  Pain has been present for 2 years, located medial, and described as dull and intermmitent  Patient denies any previous injuries or surgeries   Pain is aggravated  by walking and stairs, and he denies any instability  Patient denies any radiating pain or distal paresthesias  Pain is alleviated by Advil taken   Patient has not initiated PT or bracing for the left knee, but has received 1 CSI a little over 1 year ago with his PCP with only 1 week of relief  He also reports recent onset of right-sided lower back pain, previously treated with PT, and hip pain above the greater trochanter  ROS:    See HPI for musculoskeletal review  All other systems reviewed are negative     Historical Information   Past Medical History:   Diagnosis Date   • Class 2 severe obesity due to excess calories with serious comorbidity and body mass index (BMI) of 39 0 to 39 9 in adult [facility]) 3/22/2018   • Convulsions, 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/2012    Description: Onset 1988    Absence seizures in  when switching from dilantin to keppra   • DVT (deep venous thrombosis) ([facility] [address] )     Occurred after fall with fracture patella   • Hyperlipidemia    • Hypertension    • Seizures ([facility] [address] )      Past Surgical History:   Procedure Laterality Date   • BLEPHAROPLASTY      last assessed: 2017; upper lid w/ excessive skin    • COLONOSCOPY W/ POLYPECTOMY     • MOUTH SURGERY      gingival incision due to Dilantin therapy      Social History   Social History     Substance and Sexual Activity   Alcohol Use Yes   • Alcohol/week: 5 0 standard drinks   • Types: 5 Standard drinks or equivalent per week    Comment: Social      Social History     Substance and Sexual Activity   Drug Use No     Social History     Tobacco Use   Smoking Status Former   • Packs/day: 1 00   • Years: 10 00   • Pack years: 10 00   • Types: Cigarettes   • Quit date: 56   • Years since quittin 2   Smokeless Tobacco Never   Tobacco Comments    Quit in the s     Family History:   Family History   Problem Relation Age of Onset   • No Known Problems Mother    • No Known Problems Father    • Stroke Family         syndrome Current Outpatient Medications on File Prior to Visit   Medication Sig Dispense Refill   • DAILY MULTIPLE VITAMINS tablet Take 1 tablet by mouth daily     • furosemide (LASIX) 20 mg tablet TAKE 1 TABLET BY MOUTH EVERY DAY 30 tablet 11   • levETIRAcetam (KEPPRA) 1000 MG tablet Take 1 tablet by mouth 2 (two) times a day     • rosuvastatin (CRESTOR) 5 mg tablet TAKE 1 TABLET BY MOUTH EVERY DAY 90 tablet 3     No current facility-administered medications on file prior to visit  No Known Allergies    Current Outpatient Medications on File Prior to Visit   Medication Sig Dispense Refill   • DAILY MULTIPLE VITAMINS tablet Take 1 tablet by mouth daily     • furosemide (LASIX) 20 mg tablet TAKE 1 TABLET BY MOUTH EVERY DAY 30 tablet 11   • levETIRAcetam (KEPPRA) 1000 MG tablet Take 1 tablet by mouth 2 (two) times a day     • rosuvastatin (CRESTOR) 5 mg tablet TAKE 1 TABLET BY MOUTH EVERY DAY 90 tablet 3     No current facility-administered medications on file prior to visit  Objective   Vitals: Blood pressure 155/78, pulse 67, height 5' 11" (1 803 m), weight 97 3 kg (214 lb 6 4 oz)  ,Body mass index is 29 9 kg/m²      PE:  AAOx 3  WDWN  Hearing intact, no drainage from eyes  Regular rate  no audible wheezing  no abdominal distension  LE compartments soft, skin intact    rightknee:    Appearance:  Mild generalized swelling   No ecchymosis  no obvious joint deformity   No effusion  Palpation/Tenderness:  No TTP over medial joint line  No TTP over lateral joint line   No TTP over patella  No TTP over patellar tendon  No TTP over pes anserine bursa  Active Range of Motion:  AROM: 2-120  PROM: 2-130  Special Tests:  Patellar grind:  Negative  Valgus Stress Test:  negative  Varus Stress Test:  negative     No ipsilateral hip pain with ROM    rightLE:    Sensation grossly intact  AT/GS/EHL intact    RLE:  EHL/AT/GS/quads/hamstrings/iliopsoas 5/5, sensation grossly intact L4, L5, S1, palpable pedal pulse  LLE: EHL/AT/GS/quads/hamstrings/iliopsoas 5/5, sensation grossly intact L4, L5, S1, palpable pedal pulse      Imaging Studies: I have personally reviewed pertinent films in PACS  XR rightknee:    Severe arthritis with joint space narrowing and osteophyte formation        Large joint arthrocentesis: R knee  Universal Protocol:  Consent: Verbal consent obtained    Risks and benefits: risks, benefits and alternatives were discussed  Consent given by: patient  Patient understanding: patient states understanding of the procedure being performed  Patient consent: the patient's understanding of the procedure matches consent given    Supporting Documentation  Indications: pain   Procedure Details  Location: knee - R knee  Needle size: 22 G  Ultrasound guidance: no  Approach: anterolateral  Medications administered: 40 mg triamcinolone acetonide 40 mg/mL; 2 mL bupivacaine 0 25 %    Patient tolerance: patient tolerated the procedure well with no immediate complications  Dressing:  Sterile dressing applied

## 2023-03-17 ENCOUNTER — EVALUATION (OUTPATIENT)
Dept: PHYSICAL THERAPY | Facility: MEDICAL CENTER | Age: 88
End: 2023-03-17

## 2023-03-17 DIAGNOSIS — M17.11 PRIMARY OSTEOARTHRITIS OF RIGHT KNEE: Primary | ICD-10-CM

## 2023-03-17 NOTE — PROGRESS NOTES
PT Evaluation     Today's date: 3/17/2023  Patient name: Wilburt Skiff  : 1935  MRN: [id number]  Referring provider: Dwaine Joseph  Dx:   Encounter Diagnosis     ICD-10-CM    1  Primary osteoarthritis of right knee  M17 11 Ambulatory Referral to Physical Therapy                     Assessment  Assessment details: Wilburt Skiff is a pleasant 81 y o  male who presents with chronic R medial knee pain of an insidious onset for the last 5+ years  No further referral is necessary at this time based upon examination results  Primary movement impairment is R quadriceps weakness, which contributes to compensatory stress on his R medial knee when ambulating longer distances  Patient also presents with R patellofemoral and tibiofemoral hypomobility, which limits his ability to twist when performing recreational activities  Patient also presents with weakness in his proximal hip girdle, which further contributes to excessive strain on his R knee when performing ADL  Patient was educated in an illustrated HEP for quad activation and knee mobility and was able to complete exercises without pain  Patient would benefit from skilled PT services to address the listed impairments to facilitate a return to PLOF  Thank you for the referral      Impairments: abnormal muscle firing, abnormal muscle tone, abnormal or restricted ROM, abnormal movement, activity intolerance, impaired balance, impaired physical strength, lacks appropriate home exercise program and pain with function  Functional limitations: walking longer distances, playing golf  Symptom irritability: lowBarriers to therapy: none  Understanding of Dx/Px/POC: good   Prognosis: good  Prognosis details: Positive prognostic factors include positive attitude towards recovery  Negative prognostic factors include chronicity of symptoms, HTN      Goals  STG:  Patient will be independent with home exercise program    Patient will be able to perform proper QS on R LE to demonstrate improved quad strength for ambulation  LTG:  Patient will increase R quadriceps and hamstrings strength to at least 4+/5 to be able to ambulate longer distances  Patient will demonstrate improved patellar mobility to improve mechanics for golfing  Patient will increase R hip strength to at least 4/5 to decrease compensatory stress on R knee during ADL  Patient will be able to ambulate longer distances compared to baseline  Patient will be able to manage symptoms independently  Plan  Plan details: Prognosis is above given PT services 2x/week tapering to 1x/week over the next 8 weeks and given HEP adherence  Patient would benefit from: skilled physical therapy  Referral necessary: No  Planned modality interventions: cryotherapy and thermotherapy: hydrocollator packs  Planned therapy interventions: activity modification, balance, body mechanics training, flexibility, functional ROM exercises, gait training, graded activity, graded exercise, home exercise program, joint mobilization, manual therapy, massage, Faye taping, patient education, strengthening, stretching, therapeutic activities and therapeutic exercise  Frequency: 2x week  Duration in weeks: 8  Treatment plan discussed with: patient        Subjective Evaluation    History of Present Illness  Mechanism of injury: This is a 82 yo male presenting with R knee pain for the last 5+ years of an insidious onset gradually worsening over time  He reports that he received an injection 1 year ago with relief for a couple days  However, the pain returned over time  He received another injection on 3/1/23 with good relief, but he continues to have some pain when walking longer distances            Recurrent probem    Quality of life: good    Pain  Current pain ratin  At best pain ratin  At worst pain ratin  Location: R medial knee  Quality: sharp  Relieving factors: rest  Aggravating factors: walking  Progression: improved      Diagnostic Tests  X-ray: abnormal (severe medial tibiofemoral joint narrowing per chart)  Treatments  Previous treatment: injection treatment  Patient Goals  Patient goals for therapy: decreased pain, increased strength and return to sport/leisure activities  Patient goal: to be able to walk longer distances, to be able to golf        Objective     Active Range of Motion   Left Knee   Flexion: 120 degrees   Extension: 2 degrees     Right Knee   Flexion: 120 degrees   Extension: 2 degrees     Mobility   Patellar Mobility:   Left Knee   WFL: medial, lateral, superior and inferior  Right Knee   Hypomobile: medial, lateral, superior and inferior     Strength/Myotome Testing     Left Hip   Planes of Motion   Flexion: 3  Abduction: 3  External rotation: 3+  Internal rotation: 3+    Right Hip   Planes of Motion   Flexion: 3  Abduction: 3  External rotation: 3+  Internal rotation: 3+    Left Knee   Flexion: 5  Extension: 4-  Quadriceps contraction: good    Right Knee   Flexion: 4-  Extension: 2+  Quadriceps contraction: fair    Tests     Left Knee   Negative active quad  Right Knee   Positive active quad  Functional Assessment      Squat    Left tibial anterior translation beyond toes and right tibial anterior translation beyond toes       Single Leg Stance   Left: 2 seconds  Right: 2 seconds             Precautions: hx of seizures, thrombocytopenia, HTN, hx of DVT    HEP: QS, strap gastroc stretch  Manuals 3/17            R knee distraction NV            R patellar mobs NV                                      Neuro Re-Ed                                                                                                        Ther Ex             Rec bike             QS 5"x20 HEP            Strap gastroc stretch 30"x4 HEP            SLR NV            Supine hip ADD isometrics NV            Supine clams NV            Standing hip ABD             Wall gastroc stretch             Seated HS stretch with stool NV            HEP education and instruction x8'            Ther Activity                                       Gait Training                                       Modalities

## 2023-03-21 ENCOUNTER — OFFICE VISIT (OUTPATIENT)
Dept: PHYSICAL THERAPY | Facility: MEDICAL CENTER | Age: 88
End: 2023-03-21

## 2023-03-21 DIAGNOSIS — M17.11 PRIMARY OSTEOARTHRITIS OF RIGHT KNEE: Primary | ICD-10-CM

## 2023-03-21 NOTE — PROGRESS NOTES
Daily Note     Today's date: 3/21/2023  Patient name: Ann Dubin  : 1935  MRN: [id number]  Referring provider: Trish Aguilar  Dx:   Encounter Diagnosis     ICD-10-CM    1  Primary osteoarthritis of right knee  M17 11                      Subjective: Patient reports that his knee is feeling pretty good today with no significant soreness after his first visit  Objective: See treatment diary below      Assessment: Performed manual interventions to address tibiofemoral and patellar hypomobility  Patient reported alleviation of tightness when ambulating after seated knee distraction  He demonstrated significant multiplanar R patellar hypomobility but demonstrated a slight improvement in inferior/superior glide post-tx  Initiated knee mobility/strengthening program as outlined below  Verbal cueing provided to prevent EXT lag during SLR  Patient tolerated treatment well and was able to complete program without pain  Patient would benefit from continued PT to address impairments to maximize function  Plan: Continue per plan of care  Precautions: hx of seizures, thrombocytopenia, HTN, hx of DVT    HEP: QS, strap gastroc stretch  Manuals 3/17 3/21           R knee distraction NV KP Gr  III           R patellar mobs NV KP Gr   III 4-way                                     Neuro Re-Ed                                                                                                        Ther Ex             Rec bike  5'           QS 5"x20 HEP 5"x30           Strap gastroc stretch 30"x4 HEP 30"x4           SLR NV 2x10           Supine hip ADD isometrics NV 3"x20           Supine clams NV 3"x20 RTB           Standing hip ABD             Wall gastroc stretch             Seated HS stretch with step stool NV 20"x3           HEP education and instruction x8'            Ther Activity                                       Gait Training                                       Modalities

## 2023-03-23 ENCOUNTER — OFFICE VISIT (OUTPATIENT)
Dept: PHYSICAL THERAPY | Facility: MEDICAL CENTER | Age: 88
End: 2023-03-23

## 2023-03-23 DIAGNOSIS — M17.11 PRIMARY OSTEOARTHRITIS OF RIGHT KNEE: Primary | ICD-10-CM

## 2023-03-23 NOTE — PROGRESS NOTES
Daily Note     Today's date: 3/23/2023  Patient name: John Taveras  : 1935  MRN: [id number]  Referring provider: Syed Steen  Dx:   Encounter Diagnosis     ICD-10-CM    1  Primary osteoarthritis of right knee  M17 11                      Subjective: Patient reports that he felt relief after last session with less pain in his R knee  Objective: See treatment diary below      Assessment: Continued with manual interventions to address knee hypomobility with patient demonstrating significant improvements in multiplanar R patellar mobility compared to last session  He also achieved 0 deg of R knee EXT AROM today, which further demonstrates good progress toward goals  Able to progress reps for hip ABD/ADD, which demonstrates an improvement in mulitplanar hip endurance  Also added standing hip ABD to improve proximal stability  All interventions were performed in a pain-free range, and patient tolerated treatment well  Patient would benefit from continued PT to improve knee and hip girdle strength to return to active lifestyle  Plan: Continue per plan of care  Precautions: hx of seizures, thrombocytopenia, HTN, hx of DVT    HEP: QS, strap gastroc stretch  Manuals 3/17 3/21 3/23          R knee distraction NV KP Gr  III KP Gr  III          R patellar mobs NV KP Gr  III 4-way KP Gr   III 4-way                                    Neuro Re-Ed                                                                                                        Ther Ex             Rec bike  5' 5'          QS 5"x20 HEP 5"x30 5"x30          Strap gastroc stretch 30"x4 HEP 30"x4 30"x4          SLR NV 2x10 2x10          Supine hip ADD isometrics NV 3"x20 5"x30          Supine clams NV 3"x20 RTB 5"x30 GTB          Standing hip ABD   2x10 ea          Seated HS stretch with step stool NV 20"x3 30"x4          HEP education and instruction x8'            Ther Activity                                       Gait Training Modalities

## 2023-03-27 ENCOUNTER — OFFICE VISIT (OUTPATIENT)
Dept: PHYSICAL THERAPY | Facility: MEDICAL CENTER | Age: 88
End: 2023-03-27

## 2023-03-27 DIAGNOSIS — M17.11 PRIMARY OSTEOARTHRITIS OF RIGHT KNEE: Primary | ICD-10-CM

## 2023-03-27 NOTE — PROGRESS NOTES
"Daily Note     Today's date: 3/27/2023  Patient name: Shankar Berrios  : 1935  MRN: [id number]  Referring provider: Juan Strickland  Dx:   Encounter Diagnosis     ICD-10-CM    1  Primary osteoarthritis of right knee  M17 11                      Subjective: Patient reports that his knee is feeling better, and he is not having as much pain as previously  He notes that his ability to walk is improving  Objective: See treatment diary below      Assessment: Continued with manual interventions to address R tibiofemoral and patellar hypomobility with patient reporting decreased stiffness when ambulating after seated knee distraction  Able to progress reps for SLR, which demonstrates an improvement in quad endurance  Patient was educated to add SLR to HEP to further improve quad strength  Patient tolerated treatment well and was able to complete program without pain  Patient would benefit from continued PT to improve R knee and hip girdle strength to be able to ambulate longer distances  Plan: Continue per plan of care  Precautions: hx of seizures, thrombocytopenia, HTN, hx of DVT    HEP: QS, strap gastroc stretch, SLR  Manuals 3/17 3/21 3/23 3/27         R knee distraction NV KP Gr  III KP Gr  III KP Gr  III         R patellar mobs NV KP Gr  III 4-way KP Gr  III 4-way KP Gr   III 4-way                                   Neuro Re-Ed                                                                                                        Ther Ex             Rec bike  5' 5' 5'         QS 5\"x20 HEP 5\"x30 5\"x30 5\"x30         Strap gastroc stretch 30\"x4 HEP 30\"x4 30\"x4 30\"x4         SLR NV 2x10 2x10 3x10         Supine hip ADD isometrics NV 3\"x20 5\"x30 5\"x30         Supine clams NV 3\"x20 RTB 5\"x30 GTB 5\"x30 GTB         Standing hip ABD   2x10 ea 2x10 ea         Seated HS stretch with step stool NV 20\"x3 30\"x4 30\"x4         HEP education and instruction x8'            Ther Activity                           " Gait Training                                       Modalities

## 2023-03-30 ENCOUNTER — OFFICE VISIT (OUTPATIENT)
Dept: PHYSICAL THERAPY | Facility: MEDICAL CENTER | Age: 88
End: 2023-03-30

## 2023-03-30 DIAGNOSIS — M17.11 PRIMARY OSTEOARTHRITIS OF RIGHT KNEE: Primary | ICD-10-CM

## 2023-03-30 NOTE — PROGRESS NOTES
"Daily Note     Today's date: 3/30/2023  Patient name: Gerald Rogel  : 1935  MRN: [id number]  Referring provider: El Richardson  Dx:   Encounter Diagnosis     ICD-10-CM    1  Primary osteoarthritis of right knee  M17 11                      Subjective: Patient reports that he is feeling better with less pain since his initial visit, but the pain returns when he walks longer distances  Objective: See treatment diary below      Assessment: Patient continues to respond well to manual interventions with report of decreased R knee pain when ambulating post-tx  Patellar mobility also notably improved in all planes today  Able to initiate resistance for SLR, which demonstrates an improvement in quad strength  Patient was also able to increase reps for standing hip ABD, which indicates that his proximal endurance is improving  Patient tolerated treatment well and was able to complete program without pain  Patient would benefit from continued PT to further progress quad/hip girdle strengthening to be able to ambulate longer distances  Plan: Continue per plan of care  Precautions: hx of seizures, thrombocytopenia, HTN, hx of DVT    HEP: QS, strap gastroc stretch, SLR  Manuals 3/17 3/21 3/23 3/27 3/30        R knee distraction NV KP Gr  III KP Gr  III KP Gr  III KP Gr  III        R patellar mobs NV KP Gr  III 4-way KP Gr  III 4-way KP Gr  III 4-way KP Gr   III 4-way                                  Neuro Re-Ed                                                                                                        Ther Ex             Rec bike  5' 5' 5' 5'        QS 5\"x20 HEP 5\"x30 5\"x30 5\"x30 HEP        Strap gastroc stretch 30\"x4 HEP 30\"x4 30\"x4 30\"x4 30\"x4        SLR NV 2x10 2x10 3x10 2x10 1#        Supine hip ADD isometrics NV 3\"x20 5\"x30 5\"x30 5\"x30        Supine clams NV 3\"x20 RTB 5\"x30 GTB 5\"x30 GTB 5\"x30 blue        Standing hip ABD   2x10 ea 2x10 ea 3x10 ea        Seated HS stretch with step " "stool NV 20\"x3 30\"x4 30\"x4 30\"x4        HEP education and instruction x8'            Ther Activity                                       Gait Training                                       Modalities                                            "

## 2023-04-03 ENCOUNTER — OFFICE VISIT (OUTPATIENT)
Dept: PHYSICAL THERAPY | Facility: MEDICAL CENTER | Age: 88
End: 2023-04-03

## 2023-04-03 DIAGNOSIS — M17.11 PRIMARY OSTEOARTHRITIS OF RIGHT KNEE: Primary | ICD-10-CM

## 2023-04-03 NOTE — PROGRESS NOTES
"Daily Note     Today's date: 4/3/2023  Patient name: Mohsen Cantu  : 1935  MRN: [id number]  Referring provider: Larisa Cerda  Dx:   Encounter Diagnosis     ICD-10-CM    1  Primary osteoarthritis of right knee  M17 11                      Subjective: Patient reports that his knee is feeling the same as last session with no significant changes  He continues to have some pain when walking longer distances, but he is improved overall since beginning PT  Objective: See treatment diary below      Assessment: Continued with seated knee distraction and patellar mobs to address R knee hypomobility  Patient reported alleviation of symptoms when ambulating after seated knee distraction  Able to progress reps for SLR, which demonstrates an improvement in quad endurance  Added wall ball squats to improve CKC strength with cueing provided to prevent anterior tibial translation  Patient tolerated treatment well and was able to complete program without pain  Patient would benefit from continued PT to improve knee mobility and progress strengthening as appropriate to be able to ambulate longer distances  Plan: Continue per plan of care  Precautions: hx of seizures, thrombocytopenia, HTN, hx of DVT    HEP: QS, strap gastroc stretch, SLR  Manuals 3/17 3/21 3/23 3/27 3/30 4/3       R knee distraction NV KP Gr  III KP Gr  III KP Gr  III KP Gr  III KP Gr  III       R patellar mobs NV KP Gr  III 4-way KP Gr  III 4-way KP Gr  III 4-way KP Gr  III 4-way KP Gr   III 4-way                                 Neuro Re-Ed                                                                                                        Ther Ex             Rec bike  5' 5' 5' 5' 5'       QS 5\"x20 HEP 5\"x30 5\"x30 5\"x30 HEP HEP       Strap gastroc stretch 30\"x4 HEP 30\"x4 30\"x4 30\"x4 30\"x4 30\"x4       SLR NV 2x10 2x10 3x10 2x10 1# 3x10 1#       Supine hip ADD isometrics NV 3\"x20 5\"x30 5\"x30 5\"x30 5\"x35       Supine clams NV 3\"x20 RTB " "5\"x30 GTB 5\"x30 GTB 5\"x30 blue 5\"x30 blue       Standing hip ABD   2x10 ea 2x10 ea 3x10 ea 3x10 ea       Wall ball squats      x10       Seated HS stretch with step stool NV 20\"x3 30\"x4 30\"x4 30\"x4 30\"x4       HEP education and instruction x8'            Ther Activity                                       Gait Training                                       Modalities                                            "

## 2023-04-06 ENCOUNTER — OFFICE VISIT (OUTPATIENT)
Dept: PHYSICAL THERAPY | Facility: MEDICAL CENTER | Age: 88
End: 2023-04-06

## 2023-04-06 DIAGNOSIS — M17.11 PRIMARY OSTEOARTHRITIS OF RIGHT KNEE: Primary | ICD-10-CM

## 2023-04-06 NOTE — PROGRESS NOTES
"Daily Note     Today's date: 2023  Patient name: Shankar Berrios  : 1935  MRN: [id number]  Referring provider: Juan Strickland  Dx:   Encounter Diagnosis     ICD-10-CM    1  Primary osteoarthritis of right knee  M17 11                      Subjective: Patient reports that he is feeling about the same as last session with no major changes  He has pain when walking longer distances, but the pain is less intense compared to a few weeks ago  He also reports that he has less pain when going up and down the stairs  He notes that he had some discomfort in his knee when doing the squats last session, but he did not have any soreness after the session  Objective: See treatment diary below      Assessment: Continued with R knee distraction and patellar mobs to address hypomobility, and patient tolerated manuals well  Improved multiplanar patellar mobility noted today compared to last session  Held wall ball squats due to report of discomfort during this intervention last session  Added leg press today to further improve CKC strength  Patient tolerated treatment well and was able to complete program without pain  Patient would benefit from continued PT to improve knee mobility and progress strengthening as appropriate to be able to ambulate longer distances  Plan: Continue per plan of care  Precautions: hx of seizures, thrombocytopenia, HTN, hx of DVT    HEP: QS, strap gastroc stretch, SLR  Manuals 3/17 3/21 3/23 3/27 3/30 4/3 4/6      R knee distraction NV KP Gr  III KP Gr  III KP Gr  III KP Gr  III KP Gr  III KP Gr  III      R patellar mobs NV KP Gr  III 4-way KP Gr  III 4-way KP Gr  III 4-way KP Gr  III 4-way KP Gr  III 4-way KP Gr   III 4-way                                Neuro Re-Ed                                                                                                        Ther Ex             Rec bike  5' 5' 5' 5' 5' 5'      QS 5\"x20 HEP 5\"x30 5\"x30 5\"x30 HEP HEP HEP      Strap " "gastroc stretch 30\"x4 HEP 30\"x4 30\"x4 30\"x4 30\"x4 30\"x4 30\"x4      SLR NV 2x10 2x10 3x10 2x10 1# 3x10 1# 3x10 1#      Supine hip ADD isometrics NV 3\"x20 5\"x30 5\"x30 5\"x30 5\"x35 5\"x35      Supine clams NV 3\"x20 RTB 5\"x30 GTB 5\"x30 GTB 5\"x30 blue 5\"x30 blue 5\"x30 blue      Standing hip ABD   2x10 ea 2x10 ea 3x10 ea 3x10 ea 3x10 ea      Wall ball squats      x10 held      Leg press       2x10 DL seat 8 70#      Seated HS stretch with step stool NV 20\"x3 30\"x4 30\"x4 30\"x4 30\"x4 30\"x4      HEP education and instruction x8'            Ther Activity                                       Gait Training                                       Modalities                                            "

## 2023-04-24 ENCOUNTER — APPOINTMENT (OUTPATIENT)
Dept: PHYSICAL THERAPY | Facility: MEDICAL CENTER | Age: 88
End: 2023-04-24

## 2023-04-27 ENCOUNTER — OFFICE VISIT (OUTPATIENT)
Dept: PHYSICAL THERAPY | Facility: MEDICAL CENTER | Age: 88
End: 2023-04-27

## 2023-04-27 DIAGNOSIS — M17.11 PRIMARY OSTEOARTHRITIS OF RIGHT KNEE: Primary | ICD-10-CM

## 2023-04-27 NOTE — PROGRESS NOTES
Discharge Summary     Today's date: 2023  Patient name: Ajit Sales  : 1935  MRN: [id number]  Referring provider: Radha Curtis  Dx:   Encounter Diagnosis     ICD-10-CM    1  Primary osteoarthritis of right knee  M17 11                      Subjective: Patient reports that his knee is feeling better overall since his initial visit  He is pleased with his progress and notes that he is able to walk longer distances compared to his first visit  He has not attempted returning to golf yet, but he feels that his exercise program will help him to continue to improve  He feels ready to be discharged to his home program        Objective: See treatment diary below    R knee AROM: WFL and comparable to the contralateral side    R knee strength:  FLX: /  EXT:     R hip ABD strength: 4+/5    Assessment: Patient has demonstrated excellent progress with improving his R quad strength compared to his initial visit, which has increased his distance of ambulation compared to his baseline  Patient has also demonstrated good progress with improving multiplanar R patellar mobility, which has further improved his quality of gait mechanics  Patient has increased his R hip girdle strength, which has decreased the compensatory stress on his R anterior knee during ADL  Patient has met all of his goals for PT except for returning to golf because he did not attempt yet  However, patient is independent in a comprehensive illustrated HEP for continued quad/hip girdle strengthening and flexibility  Patient has reached his maximum benefit from formal PT services and is now discharged to his HEP  Goals  STG:  Patient will be independent with home exercise program - met   Patient will be able to perform proper QS on R LE to demonstrate improved quad strength for ambulation - met  LTG:  Patient will increase R quadriceps and hamstrings strength to at least 4+/5 to be able to ambulate longer distances  - met  Patient "will demonstrate improved patellar mobility to improve mechanics for golfing - met (did not attempt returning to golf yet)  Patient will increase R hip strength to at least 4/5 to decrease compensatory stress on R knee during ADL  - met  Patient will be able to ambulate longer distances compared to baseline - met  Patient will be able to manage symptoms independently  - met    Plan: Discharge to HEP  Precautions: hx of seizures, thrombocytopenia, HTN, hx of DVT    HEP: QS, strap gastroc stretch, SLR  Manuals 3/17 3/21 3/23 3/27 3/30 4/3 4/6 4/10 4/13 4/17 4/20 4/27   R knee distraction NV KP Gr  III KP Gr  III KP Gr  III KP Gr  III KP Gr  III KP Gr  III KP Gr  III KP Gr  III KP Gr  III KP Gr  III KP Gr  III   R patellar mobs NV KP Gr  III 4-way KP Gr  III 4-way KP Gr  III 4-way KP Gr  III 4-way KP Gr  III 4-way KP Gr  III 4-way KP Gr  III 4-way KP Gr  III 4-way KP Gr  III 4-way KP Gr  III 4-way KP Gr   III 4-way                                 Neuro Re-Ed                                                                                                                        Ther Ex               Rec bike  5' 5' 5' 5' 5' 5' 5' 5' 5' 5' 5'   QS 5\"x20 HEP 5\"x30 5\"x30 5\"x30 HEP HEP HEP HEP 5\"x30 HEP     Strap gastroc stretch 30\"x4 HEP 30\"x4 30\"x4 30\"x4 30\"x4 30\"x4 30\"x4 30\"x4 np      SLR NV 2x10 2x10 3x10 2x10 1# 3x10 1# 3x10 1# 2x10 2# 3x10 2# 3x12 2# 3x10 3# 3x10 2#   Supine hip ADD isometrics NV 3\"x20 5\"x30 5\"x30 5\"x30 5\"x35 5\"x35 5\"x35 5\"x35 2x10 + bridge x20 + bridge x20 + bridge   Supine clams NV 3\"x20 RTB 5\"x30 GTB 5\"x30 GTB 5\"x30 blue 5\"x30 blue 5\"x30 blue 5\"x30 black 5\"x30 black 5\"x35 black 5\"x35 black 5\"x35 black   Standing hip ABD   2x10 ea 2x10 ea 3x10 ea 3x10 ea 3x10 ea 3x10 ea 3x12 ea 2x10 ea 1# 3x10 ea 1# 3x10 ea 1#   Wall ball squats      x10 held        Leg press       2x10 DL seat 8 70# x20 DL seat 8 75# x25 DL seat 8 75# x20 DL seat 8 80# x20 DL seat 8 85# x20 DL seat 8 85#   Seated HS stretch with " "step stool NV 20\"x3 30\"x4 30\"x4 30\"x4 30\"x4 30\"x4 30\"x4 30\"x4 30\"x4 30\"x4 30\"x4   HEP education and instruction x8'              Ther Activity                                             Gait Training                                             Modalities                                                  "

## 2023-05-18 ENCOUNTER — OFFICE VISIT (OUTPATIENT)
Dept: SLEEP CENTER | Facility: CLINIC | Age: 88
End: 2023-05-18

## 2023-05-18 VITALS
WEIGHT: 211 LBS | HEIGHT: 71 IN | SYSTOLIC BLOOD PRESSURE: 120 MMHG | DIASTOLIC BLOOD PRESSURE: 72 MMHG | OXYGEN SATURATION: 91 % | HEART RATE: 66 BPM | BODY MASS INDEX: 29.54 KG/M2

## 2023-05-18 DIAGNOSIS — Z99.89 OBSTRUCTIVE SLEEP APNEA TREATED WITH CONTINUOUS POSITIVE AIRWAY PRESSURE (CPAP): Primary | ICD-10-CM

## 2023-05-18 DIAGNOSIS — G47.33 OBSTRUCTIVE SLEEP APNEA TREATED WITH CONTINUOUS POSITIVE AIRWAY PRESSURE (CPAP): Primary | ICD-10-CM

## 2023-05-18 NOTE — PATIENT INSTRUCTIONS
1   Continue use of CPAP equipment nightly  2  Continue to clean your equipment, as discussed  3  Contact the Sleep [address] [place] [address] with any questions or concerns prior to your next visit, as needed  4  Schedule visit for follow-up in 1 year         Nursing Support:  When: Monday through Friday 7A-5PM except holidays  Where: Our direct line is [phone]  If you are having a true emergency please call 911  In the event that the line is busy or it is after hours please leave a voice message and we will return your call  Please speak clearly, leaving your full name, birth date, best number to reach you and the reason for your call  Medication refills: We will need the name of the medication, the dosage, the ordering provider, whether you get a 30 or 90 day refill, and the pharmacy name and address  Medications will be ordered by the provider only  Nurses cannot call in prescriptions  Please allow 7 days for medication refills  Physician requested updates: If your provider requested that you call with an update after starting medication, please be ready to provide us the medication and dosage, what time you take your medication, the time you attempt to fall asleep, time you fall asleep, when you wake up, and what time you get out of bed  Sleep Study Results: We will contact you with sleep study results and/or next steps after the physician has reviewed your testing

## 2023-05-18 NOTE — PROGRESS NOTES
Progress Note - [address] 81 y o  male   :1935, MRN: [id number]  2023      Follow Up Evaluation / Problem: Moderate to Severe Obstructive Sleep Apnea  Overweight  HTN  Hx of epilepsy - controlled with Keppra      Thank you for the opportunity of participating in the evaluation and care of this patient in the Sleep Clinic at [facility][note 3]  HPI: Sera Goff is a 81y o  year old male  The patient presents for follow up of obstructive sleep apnea  He has been treated for SHON, using CPAP equipment since   A split night study was planned for re-qualification of equipment, however, respiratory events occurred later in the night during the study, resulting in a diagnostic study only, with AHI of 19 2, worsening to 72 when supine with oxygen guru of 87%  A CPAP titration study was then completed with titration to 9cm  He has been successfully using the equipment since set up in early 2018  He presents today to review compliance and effectiveness of PAP therapy  Current Outpatient Medications:   •  DAILY MULTIPLE VITAMINS tablet, Take 1 tablet by mouth daily, Disp: , Rfl:   •  furosemide (LASIX) 20 mg tablet, TAKE 1 TABLET BY MOUTH EVERY DAY, Disp: 30 tablet, Rfl: 11  •  levETIRAcetam (KEPPRA) 1000 MG tablet, Take 1 tablet by mouth 2 (two) times a day, Disp: , Rfl:   •  rosuvastatin (CRESTOR) 5 mg tablet, TAKE 1 TABLET BY MOUTH EVERY DAY, Disp: 90 tablet, Rfl: 3    How likely are you to doze off or fall asleep in the following situations, in contrast to feeling just tired? Sitting and reading: Slight chance of dozing  Watching TV: Slight chance of dozing  Sitting, inactive in a public place (e g  a theatre or a meeting):  Would never doze  As a passenger in a car for an hour without a break: Slight chance of dozing  Lying down to rest in the afternoon when circumstances permit: Moderate chance of dozing  Sitting and "talking to someone: Would never doze  Sitting quietly after a lunch without alcohol: Would never doze  In a car, while stopped for a few minutes in traffic: Would never doze  Total score: 5              Vitals:    05/18/23 0842   BP: 120/72   BP Location: Left arm   Patient Position: Sitting   Cuff Size: Adult   Pulse: 66   SpO2: 91%   Weight: 95 7 kg (211 lb)   Height: 5' 11\" (1 803 m)       Body mass index is 29 43 kg/m²  EPWORTH SLEEPINESS SCORE  Total score: 5      Past History Since Last Sleep Center Visit:   He denies any changes to his health since his last visit  Weight has been relatively stable  He reports that he uses the CPAP equipment every night and sleeps well when using it  He rarely wakes up during the night  He feels rested upon awakening, after sleeping for 8 hours per night  He reads during the day and may doze, taking a brief nap, but if active, he does not feel tired  The patient reports that he cleans the equipment appropriately, using mild soap and water and changes supplies on a regular basis  The patient's CPAP equipment was recalled, however, he received the replacement DreamStation 2 from the   The review of systems and following portions of the patient's history were reviewed and updated as appropriate: allergies, current medications, past family history, past medical history, past social history, past surgical history, and problem list       OBJECTIVE  Equipment set up date:  1/9/2018 - received DreamStation 2  PAP Pressure: Nasal CPAP set to deliver 9 cm of water pressure  Type of mask used: nasal  DME Provider: [employer]    Physical Exam:     General Appearance:   Alert, cooperative, no distress, appears stated age, overweight     Lungs:    Heart:  Clear to auscultation bilaterally, respirations unlabored      Regular rate and rhythm, S1 and S2 normal, no murmur, rub or gallop              ASSESSMENT / PLAN    1   Obstructive sleep apnea treated with " continuous positive airway pressure (CPAP)  PAP DME Resupply/Reorder          Counseling / Coordination of Care  Total clinic time spent today 25 minutes  Greater than 50% of total time was spent with the patient and / or family counseling and / or coordination of care  A description of the counseling / coordination of care:     Impressions, Diagnostic results, Prognosis, Instructions for management, Risks and benefits of treatment, Patient and family education, Risk factor reductions and Importance of compliance with treatment    Today I reviewed the patient's compliance data  he has been able to use the equipment 100% of all days recorded  Average usage was 4 or more hours 100% of all days recorded  The estimated AHI is 0 4 abnormal breathing events per hour  The patient feels they benefit from the use of PAP equipment and would like to continue PAP therapy  Response to treatment has been excellent  A prescription for supplies has been provided for the next year  He will continue using this equipment at the settings noted above for the next 12 months  At that timehe will then return for a routine follow-up evaluation  I have asked the patient to contact the [address] if he encounters any difficulties prior to that time  The following instructions have been given to the patient today:    Patient Instructions   1  Continue use of CPAP equipment nightly  2  Continue to clean your equipment, as discussed  3  Contact the Sleep [address] [place] [address] with any questions or concerns prior to your next visit, as needed  4  Schedule visit for follow-up in 1 year         Nursing Support:  When: Monday through Friday 7A-5PM except holidays  Where: Our direct line is [phone]  If you are having a true emergency please call 911  In the event that the line is busy or it is after hours please leave a voice message and we will return your call    Please speak clearly, leaving your full name, birth date, best number to reach you and the reason for your call  Medication refills: We will need the name of the medication, the dosage, the ordering provider, whether you get a 30 or 90 day refill, and the pharmacy name and address  Medications will be ordered by the provider only  Nurses cannot call in prescriptions  Please allow 7 days for medication refills  Physician requested updates: If your provider requested that you call with an update after starting medication, please be ready to provide us the medication and dosage, what time you take your medication, the time you attempt to fall asleep, time you fall asleep, when you wake up, and what time you get out of bed  Sleep Study Results: We will contact you with sleep study results and/or next steps after the physician has reviewed your testing        Ry Sagastume, [address] [facility]

## 2023-05-19 ENCOUNTER — TELEPHONE (OUTPATIENT)
Dept: SLEEP CENTER | Facility: CLINIC | Age: 88
End: 2023-05-19

## 2023-05-19 LAB

## 2023-05-22 LAB
DME PARACHUTE DELIVERY DATE ACTUAL: NORMAL
DME PARACHUTE DELIVERY DATE REQUESTED: NORMAL
DME PARACHUTE ITEM DESCRIPTION: NORMAL
DME PARACHUTE ORDER STATUS: NORMAL
DME PARACHUTE SUPPLIER NAME: NORMAL
DME PARACHUTE SUPPLIER PHONE: NORMAL

## 2023-06-02 ENCOUNTER — OFFICE VISIT (OUTPATIENT)
Dept: OBGYN CLINIC | Facility: MEDICAL CENTER | Age: 88
End: 2023-06-02

## 2023-06-02 VITALS
WEIGHT: 210.2 LBS | SYSTOLIC BLOOD PRESSURE: 134 MMHG | DIASTOLIC BLOOD PRESSURE: 77 MMHG | BODY MASS INDEX: 29.43 KG/M2 | HEART RATE: 59 BPM | HEIGHT: 71 IN

## 2023-06-02 DIAGNOSIS — M17.11 PRIMARY OSTEOARTHRITIS OF RIGHT KNEE: Primary | ICD-10-CM

## 2023-06-02 NOTE — PROGRESS NOTES
Assessment/Plan:  1  Primary osteoarthritis of right knee      Orders Placed This Encounter   Procedures   • Injection Procedure Prior Authorization     • Patient presents with severe right knee osteoarthritis  • Euflexxa 3-series ordered for for right knee osteoarthritis  The patient has failed at least 3 months of conservative treatment including analgesics, PT, activity modification  The patient has failed to respond to steroid injections  The patient's symptoms include pain   This is limiting their ACLs and sleep at times  His pain score is 4/10  • Continue home exercises as tolerated  • Continue Tylenol as needed for pain  1,000 mg up to 3 times a day  Do not exceed 3,000 mg per day  • We will continue conservative treatment as desired, informed him he would need PCP and cardiology clearances if he decides he would like to pursue a knee replacement in the future  Return for visco     I answered all of the patient's questions during the visit and provided education of the patient's condition during the visit  The patient verbalized understanding of the information given and agrees with the plan  This note was dictated using Leader[employer] software  It may contain errors including improperly dictated words  Please contact physician directly for any questions  Subjective   Chief Complaint:   Chief Complaint   Patient presents with   • Left Knee - Follow-up   • Right Knee - Follow-up       MIGUEL ANGEL  Thom Stahl is a 81 y o  male who presents for follow up for right knee pain  He received a steroid injection on 3/1/23 with 1-2 weeks of relief  He also attended PT before he was discharged, and has been performing home exercises  He feels this has been helping  Pain is alleviated by rest, CSI, and Tylenol once a day  occasionally, and aggravated by golfing  He would like to pursue visco supplementation injections, and is fine with a 3-series   He would like to continue with conservative treatment for as long as possible  Review of Systems  ROS:    See HPI for musculoskeletal review  All other systems reviewed are negative     History:  Past Medical History:   Diagnosis Date   • Class 2 severe obesity due to excess calories with serious comorbidity and body mass index (BMI) of 39 0 to 39 9 in adult [facility]) 3/22/2018   • Convulsions, epileptic ([facility] ) 5/3/2012    Description: Onset 1988    Absence seizures in 2006 when switching from dilantin to keppra   • DVT (deep venous thrombosis) ([facility] )     Occurred after fall with fracture patella   • Hyperlipidemia    • Hypertension    • Seizures ([facility] )      Past Surgical History:   Procedure Laterality Date   • BLEPHAROPLASTY      last assessed: 2017; upper lid w/ excessive skin    • COLONOSCOPY W/ POLYPECTOMY     • MOUTH SURGERY      gingival incision due to Dilantin therapy      Social History   Social History     Substance and Sexual Activity   Alcohol Use Yes   • Alcohol/week: 5 0 standard drinks of alcohol   • Types: 5 Standard drinks or equivalent per week    Comment: Social      Social History     Substance and Sexual Activity   Drug Use No     Social History     Tobacco Use   Smoking Status Former   • Packs/day: 1 00   • Years: 10 00   • Total pack years: 10 00   • Types: Cigarettes   • Quit date: 56   • Years since quittin 4   Smokeless Tobacco Never   Tobacco Comments    Quit in the 1960's     Family History:   Family History   Problem Relation Age of Onset   • No Known Problems Mother    • No Known Problems Father    • Stroke Family         syndrome       Current Outpatient Medications on File Prior to Visit   Medication Sig Dispense Refill   • DAILY MULTIPLE VITAMINS tablet Take 1 tablet by mouth daily     • furosemide (LASIX) 20 mg tablet TAKE 1 TABLET BY MOUTH EVERY DAY 30 tablet 11   • levETIRAcetam (KEPPRA) 1000 MG tablet Take 1 tablet by mouth 2 (two) times a day     • rosuvastatin (CRESTOR) 5 mg tablet TAKE 1 TABLET BY MOUTH EVERY DAY 90 tablet 3 "    No current facility-administered medications on file prior to visit  No Known Allergies     Objective     /77   Pulse 59   Ht 5' 11\" (1 803 m)   Wt 95 3 kg (210 lb 3 2 oz)   BMI 29 32 kg/m²      PE:  AAOx 3  WDWN  Hearing intact, no drainage from eyes  no audible wheezing  no abdominal distension  LE compartments soft, skin intact    Ortho Exam:  right Knee:   No erythema  no swelling  no effusion  no warmth  AROM: 0-115  No TTP  Stable to varus/valgus stress        Scribe Attestation    I,:  Telly Toscano am acting as a scribe while in the presence of the attending physician :       I,:  Dick Stephenson, DO personally performed the services described in this documentation    as scribed in my presence  :           "

## 2023-06-30 ENCOUNTER — PROCEDURE VISIT (OUTPATIENT)
Dept: OBGYN CLINIC | Facility: MEDICAL CENTER | Age: 88
End: 2023-06-30
Payer: COMMERCIAL

## 2023-06-30 VITALS
HEIGHT: 71 IN | SYSTOLIC BLOOD PRESSURE: 132 MMHG | WEIGHT: 212 LBS | DIASTOLIC BLOOD PRESSURE: 68 MMHG | HEART RATE: 72 BPM | BODY MASS INDEX: 29.68 KG/M2

## 2023-06-30 DIAGNOSIS — M17.11 PRIMARY OSTEOARTHRITIS OF RIGHT KNEE: Primary | ICD-10-CM

## 2023-06-30 PROCEDURE — 20610 DRAIN/INJ JOINT/BURSA W/O US: CPT | Performed by: ORTHOPAEDIC SURGERY

## 2023-06-30 NOTE — PROGRESS NOTES
• Patient presents with severe right knee osteoarthritis  • Patient is here for his right knee Synvisc 3 series injection  He tolerated the procedure well  Post injection instructions reviewed  • Follow up 1 week for 2/3 Synvisc 3 series injection  Large joint arthrocentesis: R knee  Universal Protocol:  Consent: Verbal consent obtained    Risks and benefits: risks, benefits and alternatives were discussed  Consent given by: patient  Patient understanding: patient states understanding of the procedure being performed  Patient consent: the patient's understanding of the procedure matches consent given    Supporting Documentation  Indications: pain   Procedure Details  Location: knee - R knee  Needle size: 22 G  Ultrasound guidance: no  Approach: anterolateral  Medications administered: 16 mg hylan 16 MG/2ML  Specialty Pharmacy Supplied: received medications from pharmacy  Patient tolerance: patient tolerated the procedure well with no immediate complications  Dressing:  Sterile dressing applied

## 2023-07-07 ENCOUNTER — PROCEDURE VISIT (OUTPATIENT)
Dept: OBGYN CLINIC | Facility: MEDICAL CENTER | Age: 88
End: 2023-07-07
Payer: COMMERCIAL

## 2023-07-07 VITALS
SYSTOLIC BLOOD PRESSURE: 149 MMHG | HEART RATE: 62 BPM | WEIGHT: 209.4 LBS | DIASTOLIC BLOOD PRESSURE: 80 MMHG | HEIGHT: 71 IN | BODY MASS INDEX: 29.31 KG/M2

## 2023-07-07 DIAGNOSIS — M17.11 PRIMARY OSTEOARTHRITIS OF RIGHT KNEE: Primary | ICD-10-CM

## 2023-07-07 PROCEDURE — 20610 DRAIN/INJ JOINT/BURSA W/O US: CPT | Performed by: ORTHOPAEDIC SURGERY

## 2023-07-07 NOTE — PROGRESS NOTES
Patient has severe right knee osteoarthritis. Patient is here for his 2nd right knee Synvisc 3 injection. He tolerated the procedure well. Post injection instructions reviewed. Follow up 1 week for 3/3 Synvisc 3 injection. Large joint arthrocentesis: R knee  Universal Protocol:  Consent: Verbal consent obtained.   Risks and benefits: risks, benefits and alternatives were discussed  Consent given by: patient  Patient understanding: patient states understanding of the procedure being performed  Patient consent: the patient's understanding of the procedure matches consent given  Site marked: the operative site was marked  Supporting Documentation  Indications: pain   Procedure Details  Location: knee - R knee  Needle size: 22 G  Ultrasound guidance: no  Approach: anterolateral  Medications administered: 16 mg hylan 16 MG/2ML  Specialty Pharmacy Supplied: received medications from pharmacy  Patient tolerance: patient tolerated the procedure well with no immediate complications  Dressing:  Sterile dressing applied

## 2023-07-14 ENCOUNTER — PROCEDURE VISIT (OUTPATIENT)
Dept: OBGYN CLINIC | Facility: MEDICAL CENTER | Age: 88
End: 2023-07-14
Payer: COMMERCIAL

## 2023-07-14 VITALS
HEIGHT: 71 IN | DIASTOLIC BLOOD PRESSURE: 79 MMHG | BODY MASS INDEX: 29.54 KG/M2 | SYSTOLIC BLOOD PRESSURE: 149 MMHG | HEART RATE: 59 BPM | WEIGHT: 211 LBS

## 2023-07-14 DIAGNOSIS — M17.11 PRIMARY OSTEOARTHRITIS OF RIGHT KNEE: Primary | ICD-10-CM

## 2023-07-14 PROCEDURE — 20610 DRAIN/INJ JOINT/BURSA W/O US: CPT | Performed by: ORTHOPAEDIC SURGERY

## 2023-07-14 NOTE — PROGRESS NOTES
· Patient has severe right knee osteoarthritis. · Patient is here for his 3rd right knee Synvisc 3 injection. · He tolerated the procedure well. · Post injection instructions reviewed. · Follow up as needed. Large joint arthrocentesis: R knee  Universal Protocol:  Consent: Verbal consent obtained.   Risks and benefits: risks, benefits and alternatives were discussed  Consent given by: patient  Patient understanding: patient states understanding of the procedure being performed  Patient consent: the patient's understanding of the procedure matches consent given    Supporting Documentation  Indications: pain   Procedure Details  Location: knee - R knee  Needle size: 22 G  Ultrasound guidance: no  Approach: anterolateral  Medications administered: 16 mg hylan 16 MG/2ML  Specialty Pharmacy Supplied: received medications from pharmacy  Patient tolerance: patient tolerated the procedure well with no immediate complications  Dressing:  Sterile dressing applied

## 2023-07-26 ENCOUNTER — APPOINTMENT (OUTPATIENT)
Dept: LAB | Facility: CLINIC | Age: 88
End: 2023-07-26
Payer: COMMERCIAL

## 2023-07-26 DIAGNOSIS — E78.00 HYPERCHOLESTEREMIA: ICD-10-CM

## 2023-07-26 DIAGNOSIS — D69.6 THROMBOCYTOPENIA (HCC): ICD-10-CM

## 2023-07-26 DIAGNOSIS — R73.09 ELEVATED GLUCOSE: ICD-10-CM

## 2023-07-26 DIAGNOSIS — I10 ESSENTIAL HYPERTENSION: ICD-10-CM

## 2023-07-26 LAB
ALBUMIN SERPL BCP-MCNC: 3.6 G/DL (ref 3.5–5)
ALP SERPL-CCNC: 71 U/L (ref 46–116)
ALT SERPL W P-5'-P-CCNC: 20 U/L (ref 12–78)
ANION GAP SERPL CALCULATED.3IONS-SCNC: 4 MMOL/L
AST SERPL W P-5'-P-CCNC: 17 U/L (ref 5–45)
BASOPHILS # BLD AUTO: 0.02 THOUSANDS/ÂΜL (ref 0–0.1)
BASOPHILS NFR BLD AUTO: 1 % (ref 0–1)
BILIRUB SERPL-MCNC: 0.61 MG/DL (ref 0.2–1)
BUN SERPL-MCNC: 20 MG/DL (ref 5–25)
CALCIUM SERPL-MCNC: 8.5 MG/DL (ref 8.3–10.1)
CHLORIDE SERPL-SCNC: 116 MMOL/L (ref 96–108)
CHOLEST SERPL-MCNC: 134 MG/DL
CO2 SERPL-SCNC: 25 MMOL/L (ref 21–32)
CREAT SERPL-MCNC: 1.01 MG/DL (ref 0.6–1.3)
EOSINOPHIL # BLD AUTO: 0.11 THOUSAND/ÂΜL (ref 0–0.61)
EOSINOPHIL NFR BLD AUTO: 3 % (ref 0–6)
ERYTHROCYTE [DISTWIDTH] IN BLOOD BY AUTOMATED COUNT: 12.9 % (ref 11.6–15.1)
EST. AVERAGE GLUCOSE BLD GHB EST-MCNC: 88 MG/DL
GFR SERPL CREATININE-BSD FRML MDRD: 67 ML/MIN/1.73SQ M
GLUCOSE P FAST SERPL-MCNC: 95 MG/DL (ref 65–99)
HBA1C MFR BLD: 4.7 %
HCT VFR BLD AUTO: 44.6 % (ref 36.5–49.3)
HDLC SERPL-MCNC: 59 MG/DL
HGB BLD-MCNC: 15.2 G/DL (ref 12–17)
IMM GRANULOCYTES # BLD AUTO: 0.01 THOUSAND/UL (ref 0–0.2)
IMM GRANULOCYTES NFR BLD AUTO: 0 % (ref 0–2)
LDLC SERPL CALC-MCNC: 59 MG/DL (ref 0–100)
LYMPHOCYTES # BLD AUTO: 0.93 THOUSANDS/ÂΜL (ref 0.6–4.47)
LYMPHOCYTES NFR BLD AUTO: 22 % (ref 14–44)
MCH RBC QN AUTO: 33.9 PG (ref 26.8–34.3)
MCHC RBC AUTO-ENTMCNC: 34.1 G/DL (ref 31.4–37.4)
MCV RBC AUTO: 100 FL (ref 82–98)
MONOCYTES # BLD AUTO: 0.34 THOUSAND/ÂΜL (ref 0.17–1.22)
MONOCYTES NFR BLD AUTO: 8 % (ref 4–12)
NEUTROPHILS # BLD AUTO: 2.75 THOUSANDS/ÂΜL (ref 1.85–7.62)
NEUTS SEG NFR BLD AUTO: 66 % (ref 43–75)
NRBC BLD AUTO-RTO: 0 /100 WBCS
PLATELET # BLD AUTO: 115 THOUSANDS/UL (ref 149–390)
PMV BLD AUTO: 10.3 FL (ref 8.9–12.7)
POTASSIUM SERPL-SCNC: 3.9 MMOL/L (ref 3.5–5.3)
PROT SERPL-MCNC: 6.6 G/DL (ref 6.4–8.4)
RBC # BLD AUTO: 4.48 MILLION/UL (ref 3.88–5.62)
SODIUM SERPL-SCNC: 145 MMOL/L (ref 135–147)
TRIGL SERPL-MCNC: 82 MG/DL
WBC # BLD AUTO: 4.16 THOUSAND/UL (ref 4.31–10.16)

## 2023-07-26 PROCEDURE — 85025 COMPLETE CBC W/AUTO DIFF WBC: CPT

## 2023-07-26 PROCEDURE — 83036 HEMOGLOBIN GLYCOSYLATED A1C: CPT

## 2023-07-26 PROCEDURE — 80061 LIPID PANEL: CPT

## 2023-07-26 PROCEDURE — 80053 COMPREHEN METABOLIC PANEL: CPT

## 2023-07-26 PROCEDURE — 36415 COLL VENOUS BLD VENIPUNCTURE: CPT

## 2023-08-02 ENCOUNTER — OFFICE VISIT (OUTPATIENT)
Dept: FAMILY MEDICINE CLINIC | Facility: CLINIC | Age: 88
End: 2023-08-02
Payer: COMMERCIAL

## 2023-08-02 VITALS
SYSTOLIC BLOOD PRESSURE: 116 MMHG | OXYGEN SATURATION: 95 % | DIASTOLIC BLOOD PRESSURE: 70 MMHG | HEART RATE: 61 BPM | BODY MASS INDEX: 29.71 KG/M2 | WEIGHT: 213 LBS

## 2023-08-02 DIAGNOSIS — D18.00 CAVERNOMA: ICD-10-CM

## 2023-08-02 DIAGNOSIS — Z99.89 OBSTRUCTIVE SLEEP APNEA TREATED WITH CONTINUOUS POSITIVE AIRWAY PRESSURE (CPAP): ICD-10-CM

## 2023-08-02 DIAGNOSIS — M17.11 PRIMARY OSTEOARTHRITIS OF RIGHT KNEE: ICD-10-CM

## 2023-08-02 DIAGNOSIS — G47.33 OBSTRUCTIVE SLEEP APNEA TREATED WITH CONTINUOUS POSITIVE AIRWAY PRESSURE (CPAP): ICD-10-CM

## 2023-08-02 DIAGNOSIS — D69.6 THROMBOCYTOPENIA (HCC): ICD-10-CM

## 2023-08-02 DIAGNOSIS — D72.810 LYMPHOPENIA: ICD-10-CM

## 2023-08-02 DIAGNOSIS — M46.1 SI (SACROILIAC) JOINT INFLAMMATION (HCC): ICD-10-CM

## 2023-08-02 DIAGNOSIS — G40.209 LOCALIZATION-RELATED FOCAL EPILEPSY WITH COMPLEX PARTIAL SEIZURES (HCC): ICD-10-CM

## 2023-08-02 DIAGNOSIS — I10 ESSENTIAL HYPERTENSION: Primary | ICD-10-CM

## 2023-08-02 DIAGNOSIS — E78.00 HYPERCHOLESTEREMIA: ICD-10-CM

## 2023-08-02 PROCEDURE — 99214 OFFICE O/P EST MOD 30 MIN: CPT | Performed by: FAMILY MEDICINE

## 2023-08-02 PROCEDURE — G0439 PPPS, SUBSEQ VISIT: HCPCS | Performed by: FAMILY MEDICINE

## 2023-08-02 NOTE — PROGRESS NOTES
Assessment and Plan:     Problem List Items Addressed This Visit        Respiratory    Obstructive sleep apnea treated with continuous positive airway pressure (CPAP)     Continue CPAP            Cardiovascular and Mediastinum    Essential hypertension - Primary     Blood pressures were well controlled this time with furosemide. Relevant Orders    Comprehensive metabolic panel    CBC and differential       Nervous and Auditory    Localization-related focal epilepsy with complex partial seizures [facility])     He does continue to follow with neurology. Continue on Keppra. Musculoskeletal and Integument    SI (sacroiliac) joint inflammation (HCC)     Back issues are stable at this time. Primary osteoarthritis of right knee     He is status post 3 Hylan injections. So far, they seem to be working. Continue as needed follow-up with orthopedics            Hematopoietic and Hemostatic    Thrombocytopenia ([address])     He has some chronic thrombocytopenia. Repeat labs in 6 months. He does have a mild leukopenia. Consider hematology work-up should this getting worse. Relevant Orders    Comprehensive metabolic panel    CBC and differential    Vitamin B12       Other    Cavernoma    Hypercholesteremia     Lipids look excellent on rosuvastatin         Relevant Orders    CBC and differential    Lymphopenia     Check labs prior to follow-up         Relevant Orders    Comprehensive metabolic panel    CBC and differential    Vitamin B12        Preventive health issues were discussed with patient, and age appropriate screening tests were ordered as noted in patient's After Visit Summary. Personalized health advice and appropriate referrals for health education or preventive services given if needed, as noted in patient's After Visit Summary.      History of Present Illness:     Patient presents for a Medicare Wellness Visit    Patient presents today for follow-up of chronic health issues as well as wellness visit. Overall, he is doing well. 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 He denies any ponds or chest pain, shortness of breath or palpitation with exercise. He is having some right knee pain but seems improved after Hylan injection. Blood pressure is excellent today. He remains on furosemide for history of elevated blood pressure as well as lower extremity swelling. Patient Care Team:  Rox Milligan MD as PCP - Ghulam Coley MD as PCP - [address] [place] [address] (RTE)     Review of Systems:     Review of Systems   Constitutional: Negative for appetite change, chills, fatigue, fever and unexpected weight change. HENT: Negative for trouble swallowing. Eyes: Negative for visual disturbance. Respiratory: Negative for cough, chest tightness, shortness of breath and wheezing. Cardiovascular: Negative for chest pain, palpitations and leg swelling. Gastrointestinal: Negative for abdominal distention, abdominal pain, blood in stool, constipation and diarrhea. Endocrine: Negative for polyuria. Genitourinary: Negative for difficulty urinating and flank pain. Musculoskeletal: Positive for arthralgias and back pain. Negative for myalgias. Skin: Negative for rash. Neurological: Negative for dizziness and light-headedness. Hematological: Negative for adenopathy. Does not bruise/bleed easily. Psychiatric/Behavioral: Negative for dysphoric mood and sleep disturbance. The patient is not nervous/anxious.          Problem List:     Patient Active Problem List   Diagnosis   • Obstructive sleep apnea treated with continuous positive airway pressure (CPAP)   • History of renal stone   • Cavernoma   • Hypercholesteremia   • Localization-related focal epilepsy with complex partial seizures (HCC)   • Essential hypertension   • Thrombocytopenia (HCC)   • SI (sacroiliac) joint inflammation (HCC)   • Primary osteoarthritis of right knee   • AK (actinic keratosis)   • Lymphopenia      Past Medical and Surgical History:     Past Medical History:   Diagnosis Date   • Class 2 severe obesity due to excess calories with serious comorbidity and body mass index (BMI) of 39.0 to 39.9 in adult [facility]) 3/22/2018   • Convulsions, 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 Absence seizures in  when switching from dilantin to keppra   • DVT (deep venous thrombosis) (HCC)     Occurred after fall with fracture patella   • Hyperlipidemia    • Hypertension    • Seizures ([address])      Past Surgical History:   Procedure Laterality Date   • BLEPHAROPLASTY      last assessed: 2017; upper lid w/ excessive skin    • COLONOSCOPY W/ POLYPECTOMY     • MOUTH SURGERY      gingival incision due to Dilantin therapy       Family History:     Family History   Problem Relation Age of Onset   • No Known Problems Mother    • No Known Problems Father    • Stroke Family         syndrome      Social History:     Social History     Socioeconomic History   • Marital status: /Civil Union     Spouse name: None   • Number of children: None   • Years of education: None   • Highest education level: None   Occupational History   • None   Tobacco Use   • Smoking status: Former     Packs/day: 1.00     Years: 10.00     Total pack years: 10.00     Types: Cigarettes     Quit date: 1960     Years since quittin.6   • Smokeless tobacco: Never   • Tobacco comments:     Quit in the 's   Vaping Use   • Vaping Use: Never used   Substance and Sexual Activity   • Alcohol use:  Yes     Alcohol/week: 5.0 standard drinks of alcohol     Types: 5 Standard drinks or equivalent per week     Comment: Social    • Drug use: No   • Sexual activity: Not Currently   Other Topics Concern   • None   Social History Narrative    Consumes 2-3 cups of coffee per day     Social Determinants of Health     Financial Resource Strain: Low Risk  (2023)    Overall Financial Resource Strain (CARDIA)    • Difficulty of Paying Living Expenses: Not hard at all   Food Insecurity: Not on file   Transportation Needs: No Transportation Needs (8/2/2023)    PRAPARE - Transportation    • Lack of Transportation (Medical): No    • Lack of Transportation (Non-Medical): No   Physical Activity: Not on file   Stress: Not on file   Social Connections: Not on file   Intimate Partner Violence: Not on file   Housing Stability: Not on file      Medications and Allergies:     Current Outpatient Medications   Medication Sig Dispense Refill   • DAILY MULTIPLE VITAMINS tablet Take 1 tablet by mouth daily     • furosemide (LASIX) 20 mg tablet TAKE 1 TABLET BY MOUTH EVERY DAY 30 tablet 11   • levETIRAcetam (KEPPRA) 1000 MG tablet Take 1 tablet by mouth 2 (two) times a day     • rosuvastatin (CRESTOR) 5 mg tablet TAKE 1 TABLET BY MOUTH EVERY DAY 90 tablet 3     No current facility-administered medications for this visit. No Known Allergies   Immunizations:     Immunization History   Administered Date(s) Administered   • COVID-19 MODERNA VACC 0.5 ML IM 01/28/2021, 02/25/2021, 10/25/2021, 04/25/2022   • COVID-19 Moderna Vac BIVALENT 12 Yr+ IM (BOOSTER ONLY) 0.5 ML 10/06/2022   • INFLUENZA 10/10/2008, 10/18/2014, 10/10/2015, 09/15/2016, 10/02/2017, 10/08/2021, 10/18/2022   • Influenza Split High Dose Preservative Free IM 10/18/2012, 10/04/2013, 10/18/2014, 10/10/2015, 09/15/2016, 10/02/2017   • Influenza, high dose seasonal 0.7 mL 10/03/2018, 10/10/2019, 10/15/2020   • Influenza, seasonal, injectable 10/10/2011   • Pneumococcal Conjugate 13-Valent 03/25/2015   • Pneumococcal Polysaccharide PPV23 10/18/2012   • Td (adult), adsorbed 07/01/2004   • Tdap 10/03/2016   • Zoster 02/25/2010   • Zoster Vaccine Recombinant 03/18/2019, 06/12/2019      Health Maintenance: There are no preventive care reminders to display for this patient.       Topic Date Due   • Influenza Vaccine (1) 09/01/2023      Medicare Screening Tests and Risk Assessments:     Malick Blank is here for his Subsequent Wellness visit. Health Risk Assessment:   Patient rates overall health as good. Patient feels that their physical health rating is same. Patient is satisfied with their life. Eyesight was rated as same. Hearing was rated as slightly worse. Patient feels that their emotional and mental health rating is same. Patients states they are never, rarely angry. Patient states they are never, rarely unusually tired/fatigued. Pain experienced in the last 7 days has been some. Patient's pain rating has been 5/10. Patient states that he has experienced no weight loss or gain in last 6 months. Depression Screening:   PHQ-2 Score: 0      Fall Risk Screening: In the past year, patient has experienced: no history of falling in past year      Home Safety:  Patient does not have trouble with stairs inside or outside of their home. Patient has working smoke alarms and has working carbon monoxide detector. Home safety hazards include: none. Nutrition:   Current diet is Regular. Medications:   Patient is not currently taking any over-the-counter supplements. Patient is able to manage medications. Activities of Daily Living (ADLs)/Instrumental Activities of Daily Living (IADLs):   Walk and transfer into and out of bed and chair?: Yes  Dress and groom yourself?: Yes    Bathe or shower yourself?: Yes    Feed yourself? Yes  Do your laundry/housekeeping?: Yes  Manage your money, pay your bills and track your expenses?: Yes  Make your own meals?: Yes    Do your own shopping?: Yes    Previous Hospitalizations:   Any hospitalizations or ED visits within the last 12 months?: No      Advance Care Planning:   Living will: Yes    Durable POA for healthcare:  Yes    Advanced directive: Yes    End of Life Decisions reviewed with patient: Yes      Cognitive Screening:   Provider or family/friend/caregiver concerned regarding cognition?: No    PREVENTIVE SCREENINGS Cardiovascular Screening:    General: Screening Not Indicated and History Lipid Disorder      Diabetes Screening:     General: Screening Current      Colorectal Cancer Screening:     General: Screening Not Indicated      Prostate Cancer Screening:    General: Screening Not Indicated      Osteoporosis Screening:    General: Screening Not Indicated      Abdominal Aortic Aneurysm (AAA) Screening:    Risk factors include: tobacco use        General: Screening Not Indicated      Lung Cancer Screening:     General: Screening Not Indicated      Hepatitis C Screening:    General: Screening Not Indicated    Screening, Brief Intervention, and Referral to Treatment (SBIRT)    Screening    Typical number of drinks in a week: 5    Single Item Drug Screening:  How often have you used an illegal drug (including marijuana) or a prescription medication for non-medical reasons in the past year? never    Single Item Drug Screen Score: 0  Interpretation: Negative screen for possible drug use disorder    No results found.      Physical Exam:     /70 (BP Location: Left arm, Patient Position: Sitting, Cuff Size: Standard)   Pulse 61   Wt 96.6 kg (213 lb)   SpO2 95%   BMI 29.71 kg/m²     Physical Exam     Chela Orta MD

## 2023-08-02 NOTE — ASSESSMENT & PLAN NOTE
He is status post 3 Hylan injections. So far, they seem to be working.   Continue as needed follow-up with orthopedics

## 2023-08-02 NOTE — ASSESSMENT & PLAN NOTE
He has some chronic thrombocytopenia. Repeat labs in 6 months. He does have a mild leukopenia. Consider hematology work-up should this getting worse.

## 2023-11-23 DIAGNOSIS — I10 ESSENTIAL HYPERTENSION: ICD-10-CM

## 2023-11-23 RX ORDER — FUROSEMIDE 20 MG/1
TABLET ORAL
Qty: 30 TABLET | Refills: 11 | Status: SHIPPED | OUTPATIENT
Start: 2023-11-23

## 2023-12-13 DIAGNOSIS — E78.00 HYPERCHOLESTEREMIA: ICD-10-CM

## 2023-12-13 RX ORDER — ROSUVASTATIN CALCIUM 5 MG/1
TABLET, COATED ORAL
Qty: 90 TABLET | Refills: 3 | Status: SHIPPED | OUTPATIENT
Start: 2023-12-13

## 2024-01-07 ENCOUNTER — NURSE TRIAGE (OUTPATIENT)
Dept: OTHER | Facility: OTHER | Age: 89
End: 2024-01-07

## 2024-01-07 ENCOUNTER — OFFICE VISIT (OUTPATIENT)
Dept: URGENT CARE | Facility: MEDICAL CENTER | Age: 89
End: 2024-01-07
Payer: COMMERCIAL

## 2024-01-07 ENCOUNTER — APPOINTMENT (OUTPATIENT)
Dept: URGENT CARE | Facility: MEDICAL CENTER | Age: 89
End: 2024-01-07
Payer: COMMERCIAL

## 2024-01-07 VITALS
TEMPERATURE: 100.2 F | RESPIRATION RATE: 18 BRPM | HEART RATE: 82 BPM | OXYGEN SATURATION: 93 % | DIASTOLIC BLOOD PRESSURE: 65 MMHG | SYSTOLIC BLOOD PRESSURE: 139 MMHG

## 2024-01-07 DIAGNOSIS — U07.1 COVID-19: ICD-10-CM

## 2024-01-07 DIAGNOSIS — R05.1 ACUTE COUGH: Primary | ICD-10-CM

## 2024-01-07 LAB
SARS-COV-2 AG UPPER RESP QL IA: POSITIVE
VALID CONTROL: ABNORMAL

## 2024-01-07 PROCEDURE — 87811 SARS-COV-2 COVID19 W/OPTIC: CPT | Performed by: NURSE PRACTITIONER

## 2024-01-07 PROCEDURE — 99213 OFFICE O/P EST LOW 20 MIN: CPT | Performed by: NURSE PRACTITIONER

## 2024-01-07 RX ORDER — NIRMATRELVIR AND RITONAVIR 300-100 MG
3 KIT ORAL 2 TIMES DAILY
Qty: 30 TABLET | Refills: 0 | Status: SHIPPED | OUTPATIENT
Start: 2024-01-07 | End: 2024-01-12

## 2024-01-07 NOTE — TELEPHONE ENCOUNTER
"Regarding: COVID Positive/ advice  ----- Message from Ronel Del Real sent at 1/7/2024  1:14 PM EST -----  \"My  tested positive for COVID, and was given PAXLOVID, we would like advise on when he should start taking it.\"    "

## 2024-01-07 NOTE — PATIENT INSTRUCTIONS
Take zyrtec or allegra daily  Use flonase 1-2 sprays in each nare daily   Use nasal saline to the nose,   Use humidifer in room  Symptoms worsen go to ER  Rest    Today you tested positive for COVID 19, RX for paxlovid provided to pt, printed and handed to patient    Patient aware to monitor his symptoms, quarantine for 5 days from the onset of symptoms, continue to wear a mask strictly for 5 days following    If worse go to the ER  Call and make a follow up appt with your PCP     Tylenol for pain and fever.

## 2024-01-07 NOTE — PROGRESS NOTES
NAME: Manoj Diamond is a 88 y.o. male  : 1935    MRN: [id number]    /65   Pulse 82   Temp 100.2 °F (37.9 °C)   Resp 18   SpO2 93%     1:56 PM    Assessment and Plan   Acute cough [R05.1]  1. Acute cough  Poct Covid 19 Rapid Antigen Test      2. COVID-19  nirmatrelvir & ritonavir (Paxlovid, 300/100,) tablet therapy pack          Manoj was seen today for cold like symptoms.    Diagnoses and all orders for this visit:    Acute cough  -     Poct Covid 19 Rapid Antigen Test    COVID-19  -     nirmatrelvir & ritonavir (Paxlovid, 300/100,) tablet therapy pack; Take 3 tablets by mouth 2 (two) times a day for 5 days Take 2 nirmatrelvir tablets + 1 ritonavir tablet together per dose        Patient Instructions     Patient Instructions   Take zyrtec or allegra daily  Use flonase 1-2 sprays in each nare daily   Use nasal saline to the nose,   Use humidifer in room  Symptoms worsen go to ER  Rest    Today you tested positive for COVID 19, RX for paxlovid provided to pt, printed and handed to patient    Patient aware to monitor his symptoms, quarantine for 5 days from the onset of symptoms, continue to wear a mask strictly for 5 days following    If worse go to the ER  Call and make a follow up appt with your PCP     Tylenol for pain and fever.     Proceed to the nearest ER if symptoms worsen, Follow up with your PCP  Continue to social distance, wash your hands, and wear your masks. Please continue to follow the CDC.gov guidelines daily for they are subject to change on COVID-19    Chief Complaint     Chief Complaint   Patient presents with    Cold Like Symptoms     Patient c/o cough, congestion, sore throat and fever x 3 days. Home Covid test performed at home was negative.          History of Present Illness     Manoj here today with cough, congestion, sore throat for three days. Home covid test done and was negative on Thursday. No other complaints, denies CP            Review of Systems   Review of Systems    Constitutional:  Positive for fatigue. Negative for chills.   HENT:  Positive for congestion, postnasal drip and sore throat. Negative for ear discharge, ear pain and trouble swallowing.    Respiratory:  Positive for cough. Negative for shortness of breath and wheezing.    Cardiovascular: Negative.    Neurological:  Negative for dizziness and headaches.         Current Medications       Current Outpatient Medications:     nirmatrelvir & ritonavir (Paxlovid, 300/100,) tablet therapy pack, Take 3 tablets by mouth 2 (two) times a day for 5 days Take 2 nirmatrelvir tablets + 1 ritonavir tablet together per dose, Disp: 30 tablet, Rfl: 0    Acetaminophen 500 MG, Take 500 mg by mouth every 6 (six) hours as needed, Disp: , Rfl:     DAILY MULTIPLE VITAMINS tablet, Take 1 tablet by mouth daily, Disp: , Rfl:     furosemide (LASIX) 20 mg tablet, TAKE 1 TABLET BY MOUTH EVERY DAY, Disp: 30 tablet, Rfl: 11    levETIRAcetam (KEPPRA) 1000 MG tablet, Take 1 tablet by mouth 2 (two) times a day, Disp: , Rfl:     rosuvastatin (CRESTOR) 5 mg tablet, TAKE 1 TABLET BY MOUTH EVERY DAY, Disp: 90 tablet, Rfl: 3    Current Allergies     Allergies as of 01/07/2024    (No Known Allergies)              Past Medical History:   Diagnosis Date    Class 2 severe obesity due to excess calories with serious comorbidity and body mass index (BMI) of 39.0 to 39.9 in adult  3/22/2018    Convulsions, epileptic (HCC) 5/3/2012    Description: Onset 1988.  Absence seizures in 2006 when switching from dilantin to keppra    DVT (deep venous thrombosis) (HCC)     Occurred after fall with fracture patella    Hyperlipidemia     Hypertension     Seizures (HCC)        Past Surgical History:   Procedure Laterality Date    BLEPHAROPLASTY      last assessed: 03/24/2017; upper lid w/ excessive skin     COLONOSCOPY W/ POLYPECTOMY      MOUTH SURGERY      gingival incision due to Dilantin therapy        Family History   Problem Relation Age of Onset    No Known Problems  Mother     No Known Problems Father     Stroke Family         syndrome         Medications have been verified.    The following portions of the patient's history were reviewed and updated as appropriate: allergies, current medications, past family history, past medical history, past social history, past surgical history and problem list.    Objective   /65   Pulse 82   Temp 100.2 °F (37.9 °C)   Resp 18   SpO2 93%      Physical Exam     Physical Exam  Constitutional:       General: He is awake. He is not in acute distress.     Appearance: Normal appearance. He is well-developed. He is not ill-appearing.   HENT:      Head: Normocephalic.      Right Ear: Hearing, tympanic membrane, ear canal and external ear normal. There is no impacted cerumen.      Left Ear: Hearing, tympanic membrane, ear canal and external ear normal. There is no impacted cerumen.      Nose: Congestion and rhinorrhea present.      Right Turbinates: Swollen.      Left Turbinates: Swollen.      Right Sinus: No maxillary sinus tenderness.      Left Sinus: No maxillary sinus tenderness.      Mouth/Throat:      Lips: Pink.      Mouth: Mucous membranes are moist.      Tongue: No lesions. Tongue does not deviate from midline.      Palate: No mass.      Pharynx: Uvula midline. Pharyngeal swelling present. No oropharyngeal exudate, posterior oropharyngeal erythema or uvula swelling.      Tonsils: No tonsillar exudate or tonsillar abscesses.      Comments: Thick mucus noted in back of throat, post nasal drip noted.   Eyes:      Pupils: Pupils are equal, round, and reactive to light.   Cardiovascular:      Rate and Rhythm: Normal rate and regular rhythm.      Pulses: Normal pulses.      Heart sounds: Normal heart sounds. No murmur heard.     No friction rub.   Pulmonary:      Effort: Pulmonary effort is normal.      Breath sounds: Normal breath sounds and air entry. No decreased breath sounds, wheezing, rhonchi or rales.   Musculoskeletal:          "General: Normal range of motion.      Cervical back: Normal range of motion.   Skin:     General: Skin is warm.      Capillary Refill: Capillary refill takes less than 2 seconds.   Neurological:      General: No focal deficit present.      Mental Status: He is alert and oriented to person, place, and time.   Psychiatric:         Attention and Perception: Attention normal.         Mood and Affect: Mood normal.         Behavior: Behavior is cooperative.               Note: Portions of this record may have been created with voice recognition software. Occasional wrong word or \"sound a like\" substitutions may have occurred due to the inherent limitations of voice recognition software. Please read the chart carefully and recognize, using context, where substitutions have occurred.*    DONOVAN Guerra  "

## 2024-01-07 NOTE — TELEPHONE ENCOUNTER
"Reason for Disposition   Health Information question, no triage required and triager able to answer question    Answer Assessment - Initial Assessment Questions  1. REASON FOR CALL or QUESTION: \"What is your reason for calling today?\" or \"How can I best help you?\" or \"What question do you have that I can help answer?\"      Patient and wife calling in now- [place] [facility] [place] and prescribed paxlovid. They are asking when patient should start taking it.    Protocols used: Information Only Call - No Triage-ADULT-      Advised patient that if the provider who evaluated patient earlier today discusses otherwise, it is recommended to start taking the medication now. Other questions and concerns answered. They both verbalized understanding.     Please call patient and/or wife on Monday 1/8 to follow up.   "

## 2024-01-30 ENCOUNTER — TRANSCRIBE ORDERS (OUTPATIENT)
Dept: LAB | Facility: CLINIC | Age: 89
End: 2024-01-30

## 2024-01-30 ENCOUNTER — APPOINTMENT (OUTPATIENT)
Dept: LAB | Facility: CLINIC | Age: 89
End: 2024-01-30
Payer: COMMERCIAL

## 2024-01-30 DIAGNOSIS — E78.00 HYPERCHOLESTEREMIA: ICD-10-CM

## 2024-01-30 DIAGNOSIS — G40.209 LOCALIZATION-RELATED FOCAL EPILEPSY WITH COMPLEX PARTIAL SEIZURES (HCC): ICD-10-CM

## 2024-01-30 DIAGNOSIS — I10 ESSENTIAL HYPERTENSION: ICD-10-CM

## 2024-01-30 DIAGNOSIS — D72.810 LYMPHOPENIA: ICD-10-CM

## 2024-01-30 DIAGNOSIS — D69.6 THROMBOCYTOPENIA (HCC): ICD-10-CM

## 2024-01-30 DIAGNOSIS — G40.209 LOCALIZATION-RELATED FOCAL EPILEPSY WITH COMPLEX PARTIAL SEIZURES (HCC): Primary | ICD-10-CM

## 2024-01-30 LAB
ALBUMIN SERPL BCP-MCNC: 3.9 G/DL (ref 3.5–5)
ALP SERPL-CCNC: 66 U/L (ref 34–104)
ALT SERPL W P-5'-P-CCNC: 18 U/L (ref 7–52)
ANION GAP SERPL CALCULATED.3IONS-SCNC: 7 MMOL/L
AST SERPL W P-5'-P-CCNC: 21 U/L (ref 13–39)
BASOPHILS # BLD AUTO: 0.02 THOUSANDS/ÂΜL (ref 0–0.1)
BASOPHILS NFR BLD AUTO: 1 % (ref 0–1)
BILIRUB SERPL-MCNC: 0.78 MG/DL (ref 0.2–1)
BUN SERPL-MCNC: 20 MG/DL (ref 5–25)
CALCIUM SERPL-MCNC: 9 MG/DL (ref 8.4–10.2)
CHLORIDE SERPL-SCNC: 109 MMOL/L (ref 96–108)
CO2 SERPL-SCNC: 28 MMOL/L (ref 21–32)
CREAT SERPL-MCNC: 0.93 MG/DL (ref 0.6–1.3)
EOSINOPHIL # BLD AUTO: 0.12 THOUSAND/ÂΜL (ref 0–0.61)
EOSINOPHIL NFR BLD AUTO: 3 % (ref 0–6)
ERYTHROCYTE [DISTWIDTH] IN BLOOD BY AUTOMATED COUNT: 13.8 % (ref 11.6–15.1)
GFR SERPL CREATININE-BSD FRML MDRD: 73 ML/MIN/1.73SQ M
GLUCOSE P FAST SERPL-MCNC: 93 MG/DL (ref 65–99)
HCT VFR BLD AUTO: 44.2 % (ref 36.5–49.3)
HGB BLD-MCNC: 14.7 G/DL (ref 12–17)
IMM GRANULOCYTES # BLD AUTO: 0.01 THOUSAND/UL (ref 0–0.2)
IMM GRANULOCYTES NFR BLD AUTO: 0 % (ref 0–2)
LYMPHOCYTES # BLD AUTO: 0.88 THOUSANDS/ÂΜL (ref 0.6–4.47)
LYMPHOCYTES NFR BLD AUTO: 22 % (ref 14–44)
MCH RBC QN AUTO: 33.6 PG (ref 26.8–34.3)
MCHC RBC AUTO-ENTMCNC: 33.3 G/DL (ref 31.4–37.4)
MCV RBC AUTO: 101 FL (ref 82–98)
MONOCYTES # BLD AUTO: 0.38 THOUSAND/ÂΜL (ref 0.17–1.22)
MONOCYTES NFR BLD AUTO: 10 % (ref 4–12)
NEUTROPHILS # BLD AUTO: 2.61 THOUSANDS/ÂΜL (ref 1.85–7.62)
NEUTS SEG NFR BLD AUTO: 64 % (ref 43–75)
NRBC BLD AUTO-RTO: 0 /100 WBCS
PLATELET # BLD AUTO: 99 THOUSANDS/UL (ref 149–390)
PMV BLD AUTO: 10.5 FL (ref 8.9–12.7)
POTASSIUM SERPL-SCNC: 4.3 MMOL/L (ref 3.5–5.3)
PROT SERPL-MCNC: 6.1 G/DL (ref 6.4–8.4)
RBC # BLD AUTO: 4.37 MILLION/UL (ref 3.88–5.62)
SODIUM SERPL-SCNC: 144 MMOL/L (ref 135–147)
VIT B12 SERPL-MCNC: 357 PG/ML (ref 180–914)
WBC # BLD AUTO: 4.02 THOUSAND/UL (ref 4.31–10.16)

## 2024-01-30 PROCEDURE — 80177 DRUG SCRN QUAN LEVETIRACETAM: CPT

## 2024-01-30 PROCEDURE — 85025 COMPLETE CBC W/AUTO DIFF WBC: CPT

## 2024-01-30 PROCEDURE — 36415 COLL VENOUS BLD VENIPUNCTURE: CPT

## 2024-01-30 PROCEDURE — 80053 COMPREHEN METABOLIC PANEL: CPT

## 2024-01-30 PROCEDURE — 82607 VITAMIN B-12: CPT

## 2024-02-01 LAB — LEVETIRACETAM SERPL-MCNC: 22.2 UG/ML (ref 10–40)

## 2024-02-05 ENCOUNTER — OFFICE VISIT (OUTPATIENT)
Dept: FAMILY MEDICINE CLINIC | Facility: CLINIC | Age: 89
End: 2024-02-05
Payer: COMMERCIAL

## 2024-02-05 VITALS
SYSTOLIC BLOOD PRESSURE: 138 MMHG | DIASTOLIC BLOOD PRESSURE: 78 MMHG | OXYGEN SATURATION: 95 % | BODY MASS INDEX: 29.54 KG/M2 | RESPIRATION RATE: 18 BRPM | WEIGHT: 211.8 LBS | TEMPERATURE: 98.5 F | HEART RATE: 59 BPM

## 2024-02-05 DIAGNOSIS — G40.209 LOCALIZATION-RELATED FOCAL EPILEPSY WITH COMPLEX PARTIAL SEIZURES (HCC): ICD-10-CM

## 2024-02-05 DIAGNOSIS — M17.11 PRIMARY OSTEOARTHRITIS OF RIGHT KNEE: ICD-10-CM

## 2024-02-05 DIAGNOSIS — D69.6 THROMBOCYTOPENIA (HCC): ICD-10-CM

## 2024-02-05 DIAGNOSIS — M46.1 SI (SACROILIAC) JOINT INFLAMMATION (HCC): ICD-10-CM

## 2024-02-05 DIAGNOSIS — D18.00 CAVERNOMA: ICD-10-CM

## 2024-02-05 DIAGNOSIS — G47.33 OBSTRUCTIVE SLEEP APNEA TREATED WITH CONTINUOUS POSITIVE AIRWAY PRESSURE (CPAP): Primary | ICD-10-CM

## 2024-02-05 DIAGNOSIS — I10 ESSENTIAL HYPERTENSION: ICD-10-CM

## 2024-02-05 DIAGNOSIS — D72.810 LYMPHOPENIA: ICD-10-CM

## 2024-02-05 DIAGNOSIS — E78.00 HYPERCHOLESTEREMIA: ICD-10-CM

## 2024-02-05 PROCEDURE — 99214 OFFICE O/P EST MOD 30 MIN: CPT | Performed by: FAMILY MEDICINE

## 2024-02-05 NOTE — ASSESSMENT & PLAN NOTE
Blood pressure was a bit elevated upon arrival.  He had several readings above 140/90 and his final reading was 138/78.  I asked him to start checking his blood pressure at home again once or twice a week after sitting quietly for 5 minutes in the morning.  Notify me if he is running above 140/90.

## 2024-02-05 NOTE — PROGRESS NOTES
Assessment/Plan:       Problem List Items Addressed This Visit        Respiratory    Obstructive sleep apnea treated with continuous positive airway pressure (CPAP) - Primary     He remains on CPAP.            Cardiovascular and Mediastinum    Essential hypertension     Blood pressure was a bit elevated upon arrival.  He had several readings above 140/90 and his final reading was 138/78.  I asked him to start checking his blood pressure at home again once or twice a week after sitting quietly for 5 minutes in the morning.  Notify me if he is running above 140/90.         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            Musculoskeletal and Integument    SI (sacroiliac) joint inflammation (HCC)     Fortunately, this is improved significantly.         Primary osteoarthritis of right knee     He has done well with Euflexxa shots.  He is starting to have some increasing pain and I referred him back to orthopedics.         Relevant Orders    Ambulatory referral to Orthopedic Surgery       Hematopoietic and Hemostatic    Thrombocytopenia (HCC)     Platelets did trend down a bit so I am going to ask him to get some blood work done within a month or 2.  This will be ordered today.         Relevant Orders    Comprehensive metabolic panel    CBC and differential       Other    Cavernoma    Hypercholesteremia    Relevant Orders    Comprehensive metabolic panel    Lymphopenia     Repeat CBC prior to follow-up.                Subjective:      Patient ID: Manoj Diamond is a 88 y.o. male.    Patient presents today for follow-up of chronic health issues.  He is accompanied by his wife.  Blood pressure initially was a bit elevated.  He denies any present chest pain, shortness of breath or palpitations.  He is having some pain that is  increasing in his right knee.  He had some pretty significant relief with Euflexxa injections and would like to follow back with orthopedics.  Low back pain remains pretty well-controlled at this time.        The following portions of the patient's history were reviewed and updated as appropriate: allergies, current medications, past family history, past medical history, past social history, past surgical history and problem list.      Current Outpatient Medications:   •  Acetaminophen 500 MG, Take 500 mg by mouth every 6 (six) hours as needed, Disp: , Rfl:   •  DAILY MULTIPLE VITAMINS tablet, Take 1 tablet by mouth daily, Disp: , Rfl:   •  furosemide (LASIX) 20 mg tablet, TAKE 1 TABLET BY MOUTH EVERY DAY, Disp: 30 tablet, Rfl: 11  •  levETIRAcetam (KEPPRA) 1000 MG tablet, Take 1 tablet by mouth 2 (two) times a day, Disp: , Rfl:   •  rosuvastatin (CRESTOR) 5 mg tablet, TAKE 1 TABLET BY MOUTH EVERY DAY, Disp: 90 tablet, Rfl: 3     Review of Systems   Constitutional:  Negative for appetite change, chills, fatigue, fever and unexpected weight change.   HENT:  Negative for trouble swallowing.    Eyes:  Negative for visual disturbance.   Respiratory:  Negative for cough, chest tightness, shortness of breath and wheezing.    Cardiovascular:  Negative for chest pain, palpitations and leg swelling.   Gastrointestinal:  Negative for abdominal distention, abdominal pain, blood in stool, constipation and diarrhea.   Endocrine: Negative for polyuria.   Genitourinary:  Negative for difficulty urinating and flank pain.   Musculoskeletal:  Positive for arthralgias. Negative for myalgias.   Skin:  Negative for rash.   Neurological:  Negative for dizziness and light-headedness.   Hematological:  Negative for adenopathy. Does not bruise/bleed easily.   Psychiatric/Behavioral:  Negative for dysphoric mood and sleep disturbance. The patient is not nervous/anxious.          Objective:      /78 (BP Location: Left arm, Patient  Position: Sitting, Cuff Size: Large)   Pulse 59   Temp 98.5 °F (36.9 °C) (Tympanic)   Resp 18   Wt 96.1 kg (211 lb 12.8 oz)   SpO2 95%   BMI 29.54 kg/m²          Physical Exam  Vitals reviewed.   Constitutional:       General: He is not in acute distress.     Appearance: He is well-developed. He is obese. He is not diaphoretic.   HENT:      Head: Normocephalic.   Eyes:      General:         Right eye: No discharge.         Left eye: No discharge.      Pupils: Pupils are equal, round, and reactive to light.   Neck:      Thyroid: No thyromegaly.      Trachea: No tracheal deviation.   Cardiovascular:      Rate and Rhythm: Normal rate and regular rhythm.      Heart sounds: Normal heart sounds. No murmur heard.  Pulmonary:      Effort: Pulmonary effort is normal. No respiratory distress.      Breath sounds: No wheezing or rales.   Abdominal:      General: There is no distension.      Palpations: Abdomen is soft.      Tenderness: There is no abdominal tenderness.   Musculoskeletal:         General: Normal range of motion.      Right lower leg: No edema.      Left lower leg: No edema.   Lymphadenopathy:      Cervical: No cervical adenopathy.   Skin:     General: Skin is warm.      Findings: No erythema.   Neurological:      General: No focal deficit present.      Mental Status: He is alert and oriented to person, place, and time.      Gait: Gait normal.   Psychiatric:         Thought Content: Thought content normal.         Judgment: Judgment normal.           Carlos Brandt Jr, MD

## 2024-02-05 NOTE — PATIENT INSTRUCTIONS
1. Obstructive sleep apnea treated with continuous positive airway pressure (CPAP)  Assessment & Plan:  He remains on CPAP.      2. SI (sacroiliac) joint inflammation (HCC)  Assessment & Plan:  Fortunately, this is improved significantly.      3. 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      4. Thrombocytopenia (HCC)  Assessment & Plan:  Platelets did trend down a bit so I am going to ask him to get some blood work done within a month or 2.  This will be ordered today.    Orders:  -     Comprehensive metabolic panel; Future  -     CBC and differential; Future    5. Essential hypertension  Assessment & Plan:  Blood pressure was a bit elevated upon arrival.  He had several readings above 140/90 and his final reading was 138/78.  I asked him to start checking his blood pressure at home again once or twice a week after sitting quietly for 5 minutes in the morning.  Notify me if he is running above 140/90.    Orders:  -     Comprehensive metabolic panel; Future  -     CBC and differential; Future    6. Hypercholesteremia  -     Comprehensive metabolic panel; Future    7. Primary osteoarthritis of right knee  Assessment & Plan:  He has done well with Euflexxa shots.  He is starting to have some increasing pain and I referred him back to orthopedics.    Orders:  -     Ambulatory referral to Orthopedic Surgery; Future    8. Cavernoma    9. Lymphopenia

## 2024-02-05 NOTE — ASSESSMENT & PLAN NOTE
Platelets did trend down a bit so I am going to ask him to get some blood work done within a month or 2.  This will be ordered today.

## 2024-02-05 NOTE — ASSESSMENT & PLAN NOTE
He has done well with Euflexxa shots.  He is starting to have some increasing pain and I referred him back to orthopedics.

## 2024-02-09 ENCOUNTER — OFFICE VISIT (OUTPATIENT)
Dept: OBGYN CLINIC | Facility: MEDICAL CENTER | Age: 89
End: 2024-02-09
Payer: COMMERCIAL

## 2024-02-09 VITALS
HEART RATE: 61 BPM | WEIGHT: 211.2 LBS | SYSTOLIC BLOOD PRESSURE: 148 MMHG | DIASTOLIC BLOOD PRESSURE: 72 MMHG | BODY MASS INDEX: 29.57 KG/M2 | HEIGHT: 71 IN

## 2024-02-09 DIAGNOSIS — M17.11 PRIMARY OSTEOARTHRITIS OF RIGHT KNEE: ICD-10-CM

## 2024-02-09 PROCEDURE — 99213 OFFICE O/P EST LOW 20 MIN: CPT | Performed by: ORTHOPAEDIC SURGERY

## 2024-02-09 NOTE — PROGRESS NOTES
Assessment/Plan     1. Primary osteoarthritis of right knee      Orders Placed This Encounter   Procedures    Injection Procedure Prior Authorization         Patient has severe right knee osteoarthritis.   Injections: Order placed for series of 3 VS injections. Can be admin as soon as approved.   Medications: Tylenol up to 3000 mg per day  PT: Continue home exercises.   Bracing:  none  Activity: Continue activity as tolerated.   Plan for next appt:  repeat VS series      Return for right knee VS series.    I answered all of the patient's questions during the visit and provided education of the patient's condition during the visit.  The patient verbalized understanding of the information given and agrees with the plan.  This note was dictated using Myows software.  It may contain errors including improperly dictated words.  Please contact physician directly for any questions.    History of Present Illness   Chief complaint:   Chief Complaint   Patient presents with    Right Knee - Follow-up, Pain       HPI: Manoj Diamond is a 88 y.o. male that c/o right knee pain.      Mechanism of Injury: NA  Pain Description: right knee pain, located over the medial aspect of the knee, described as sharp, overall relieved from gel series   Palliating Factors: rest  Provoking Factors: walking  Associated Symptoms: none  Medications: tylenol as needed for pain   Able to take NSAIDs? If not, why: no  Physical Therapy or Home Exercises: did PT prior to gel series and had issues with back pain so discontinued   Injections: received right knee synvisc series on 7/14/23 and reports very good relief which lasted until the last few weeks when relief started to wear off  Bracing: none  Previous Surgery: none  Miscellaneous: NA     ROS:    See HPI for musculoskeletal review.   All other systems reviewed are negative     Historical Information   Past Medical History:   Diagnosis Date    Class 2 severe obesity due to excess calories with  serious comorbidity and body mass index (BMI) of 39.0 to 39.9 in adult  3/22/2018    Convulsions, epileptic (HCC) 5/3/2012    Description: Onset .  Absence seizures in  when switching from dilantin to keppra    DVT (deep venous thrombosis) (HCC)     Occurred after fall with fracture patella    Hyperlipidemia     Hypertension     Seizures (HCC)      Past Surgical History:   Procedure Laterality Date    BLEPHAROPLASTY      last assessed: 2017; upper lid w/ excessive skin     COLONOSCOPY W/ POLYPECTOMY      MOUTH SURGERY      gingival incision due to Dilantin therapy      Social History   Social History     Substance and Sexual Activity   Alcohol Use Yes    Alcohol/week: 5.0 standard drinks of alcohol    Types: 5 Standard drinks or equivalent per week    Comment: Social      Social History     Substance and Sexual Activity   Drug Use No     Social History     Tobacco Use   Smoking Status Former    Current packs/day: 0.00    Average packs/day: 1 pack/day for 10.0 years (10.0 ttl pk-yrs)    Types: Cigarettes    Start date:     Quit date:     Years since quittin.1   Smokeless Tobacco Never   Tobacco Comments    Quit in the 's     Family History:   Family History   Problem Relation Age of Onset    No Known Problems Mother     No Known Problems Father     Stroke Family         syndrome       Current Outpatient Medications on File Prior to Visit   Medication Sig Dispense Refill    Acetaminophen 500 MG Take 500 mg by mouth every 6 (six) hours as needed      DAILY MULTIPLE VITAMINS tablet Take 1 tablet by mouth daily      furosemide (LASIX) 20 mg tablet TAKE 1 TABLET BY MOUTH EVERY DAY 30 tablet 11    levETIRAcetam (KEPPRA) 1000 MG tablet Take 1 tablet by mouth 2 (two) times a day      rosuvastatin (CRESTOR) 5 mg tablet TAKE 1 TABLET BY MOUTH EVERY DAY 90 tablet 3     No current facility-administered medications on file prior to visit.     Not on File    Current Outpatient Medications on File  "Prior to Visit   Medication Sig Dispense Refill    Acetaminophen 500 MG Take 500 mg by mouth every 6 (six) hours as needed      DAILY MULTIPLE VITAMINS tablet Take 1 tablet by mouth daily      furosemide (LASIX) 20 mg tablet TAKE 1 TABLET BY MOUTH EVERY DAY 30 tablet 11    levETIRAcetam (KEPPRA) 1000 MG tablet Take 1 tablet by mouth 2 (two) times a day      rosuvastatin (CRESTOR) 5 mg tablet TAKE 1 TABLET BY MOUTH EVERY DAY 90 tablet 3     No current facility-administered medications on file prior to visit.       Objective   Vitals: Blood pressure 148/72, pulse 61, height 5' 11\" (1.803 m), weight 95.8 kg (211 lb 3.2 oz).,Body mass index is 29.46 kg/m².    PE:  AAOx 3  WDWN  Hearing intact, no drainage from eyes  Regular rate  no audible wheezing  no abdominal distension  LE compartments soft, skin intact    rightknee:    Appearance:  No  swelling   No  ecchymosis  No  obvious joint deformity   No  effusion   Palpation/Tenderness:  Yes  TTP over medial joint line  No  TTP over lateral joint line   No  TTP over patella  No  TTP over patellar tendon  No  TTP over pes anserine bursa  Active Range of Motion:  AROM: 3- 120  Special Tests:  Valgus Stress Test: negative  Varus Stress Test: negative        Scribe Attestation      I,:  Hodan Vargas PA-C am acting as a scribe while in the presence of the attending physician.:       I,:  Mellissa Lisa DO personally performed the services described in this documentation    as scribed in my presence.:                "

## 2024-02-27 ENCOUNTER — PATIENT MESSAGE (OUTPATIENT)
Dept: FAMILY MEDICINE CLINIC | Facility: CLINIC | Age: 89
End: 2024-02-27

## 2024-02-27 VITALS — SYSTOLIC BLOOD PRESSURE: 139 MMHG | DIASTOLIC BLOOD PRESSURE: 68 MMHG

## 2024-03-01 ENCOUNTER — PROCEDURE VISIT (OUTPATIENT)
Dept: OBGYN CLINIC | Facility: MEDICAL CENTER | Age: 89
End: 2024-03-01
Payer: COMMERCIAL

## 2024-03-01 VITALS
WEIGHT: 212.2 LBS | DIASTOLIC BLOOD PRESSURE: 74 MMHG | HEIGHT: 71 IN | SYSTOLIC BLOOD PRESSURE: 127 MMHG | BODY MASS INDEX: 29.71 KG/M2 | HEART RATE: 72 BPM

## 2024-03-01 DIAGNOSIS — M17.11 PRIMARY OSTEOARTHRITIS OF RIGHT KNEE: Primary | ICD-10-CM

## 2024-03-01 PROCEDURE — 20610 DRAIN/INJ JOINT/BURSA W/O US: CPT | Performed by: ORTHOPAEDIC SURGERY

## 2024-03-01 NOTE — PROGRESS NOTES
1. Primary osteoarthritis of right knee            Patient has severe right knee osteoarthritis  Patient is here for his first injection of Synvisc into the right knee.   Physical exam of the knee shows no effusion no ecchymosis.  Patient tolerated procedure follow up 1 week for second injection of Synvisc to right knee.    Large joint arthrocentesis: R knee  Universal Protocol:  Consent: Verbal consent obtained.  Risks and benefits: risks, benefits and alternatives were discussed  Consent given by: patient  Patient understanding: patient states understanding of the procedure being performed  Site marked: the operative site was marked  Patient identity confirmed: verbally with patient  Supporting Documentation  Indications: pain   Procedure Details  Location: knee - R knee  Needle size: 22 G  Ultrasound guidance: no  Approach: anterolateral  Medications administered: 16 mg hylan 16 MG/2ML    Patient tolerance: patient tolerated the procedure well with no immediate complications  Dressing:  Sterile dressing applied

## 2024-03-08 ENCOUNTER — PROCEDURE VISIT (OUTPATIENT)
Dept: OBGYN CLINIC | Facility: MEDICAL CENTER | Age: 89
End: 2024-03-08
Payer: COMMERCIAL

## 2024-03-08 VITALS
BODY MASS INDEX: 29.54 KG/M2 | DIASTOLIC BLOOD PRESSURE: 68 MMHG | SYSTOLIC BLOOD PRESSURE: 142 MMHG | HEIGHT: 71 IN | WEIGHT: 211 LBS | HEART RATE: 61 BPM

## 2024-03-08 DIAGNOSIS — M17.11 PRIMARY OSTEOARTHRITIS OF RIGHT KNEE: Primary | ICD-10-CM

## 2024-03-08 PROCEDURE — 20610 DRAIN/INJ JOINT/BURSA W/O US: CPT | Performed by: PHYSICIAN ASSISTANT

## 2024-03-08 NOTE — PROGRESS NOTES
1. Primary osteoarthritis of right knee  Large joint arthrocentesis: R knee          Patient has severe right knee osteoarthritis  Patient received right knee synvisc injection 2/3.   Patient tolerated procedure well.   Post injection instructions reviewed.   Follow up 1 week for third injection of Synvisc to right knee.      Large joint arthrocentesis: R knee  Universal Protocol:  Consent: Verbal consent obtained.  Risks and benefits: risks, benefits and alternatives were discussed  Consent given by: patient  Patient understanding: patient states understanding of the procedure being performed  Site marked: the operative site was marked  Patient identity confirmed: verbally with patient  Supporting Documentation  Indications: pain   Procedure Details  Location: knee - R knee  Needle size: 22 G  Ultrasound guidance: no  Approach: anterolateral  Medications administered: 16 mg hylan 16 MG/2ML  Specialty Pharmacy Supplied: received medications from pharmacy  Patient tolerance: patient tolerated the procedure well with no immediate complications  Dressing:  Sterile dressing applied

## 2024-03-15 ENCOUNTER — PROCEDURE VISIT (OUTPATIENT)
Dept: OBGYN CLINIC | Facility: MEDICAL CENTER | Age: 89
End: 2024-03-15
Payer: COMMERCIAL

## 2024-03-15 VITALS
HEART RATE: 66 BPM | HEIGHT: 71 IN | DIASTOLIC BLOOD PRESSURE: 76 MMHG | WEIGHT: 210 LBS | BODY MASS INDEX: 29.4 KG/M2 | SYSTOLIC BLOOD PRESSURE: 130 MMHG

## 2024-03-15 DIAGNOSIS — M17.11 PRIMARY OSTEOARTHRITIS OF RIGHT KNEE: Primary | ICD-10-CM

## 2024-03-15 PROCEDURE — 20610 DRAIN/INJ JOINT/BURSA W/O US: CPT

## 2024-03-15 NOTE — PROGRESS NOTES
"1. Primary osteoarthritis of right knee          Patient is here for his third injection of Synvisc into the right knee.     Patient has reported improvements from previous visco injections. Pain is rated at 2/10.    All organ systems normal  Physical exam of the knee shows no effusion no ecchymosis.      Large joint arthrocentesis: R knee  Universal Protocol:  Consent: Verbal consent obtained.  Risks and benefits: risks, benefits and alternatives were discussed  Consent given by: patient  Time out: Immediately prior to procedure a \"time out\" was called to verify the correct patient, procedure, equipment, support staff and site/side marked as required.  Patient understanding: patient states understanding of the procedure being performed  Patient consent: the patient's understanding of the procedure matches consent given  Site marked: the operative site was marked  Patient identity confirmed: verbally with patient  Supporting Documentation  Indications: pain   Procedure Details  Location: knee - R knee  Preparation: Patient was prepped and draped in the usual sterile fashion  Needle size: 22 G  Ultrasound guidance: no  Approach: lateral  Medications administered: 16 mg hylan 16 MG/2ML    Patient tolerance: patient tolerated the procedure well with no immediate complications  Dressing:  Sterile dressing applied          Patient tolerated procedure follow up as needed for any new or worsening symptoms.     "

## 2024-04-24 ENCOUNTER — APPOINTMENT (OUTPATIENT)
Dept: LAB | Facility: CLINIC | Age: 89
End: 2024-04-24
Payer: COMMERCIAL

## 2024-04-24 DIAGNOSIS — D69.6 THROMBOCYTOPENIA (HCC): ICD-10-CM

## 2024-04-24 DIAGNOSIS — I10 ESSENTIAL HYPERTENSION: ICD-10-CM

## 2024-04-24 DIAGNOSIS — E78.00 HYPERCHOLESTEREMIA: ICD-10-CM

## 2024-04-24 LAB
ALBUMIN SERPL BCP-MCNC: 4 G/DL (ref 3.5–5)
ALP SERPL-CCNC: 65 U/L (ref 34–104)
ALT SERPL W P-5'-P-CCNC: 15 U/L (ref 7–52)
ANION GAP SERPL CALCULATED.3IONS-SCNC: 7 MMOL/L (ref 4–13)
AST SERPL W P-5'-P-CCNC: 19 U/L (ref 13–39)
BASOPHILS # BLD AUTO: 0.03 THOUSANDS/ÂΜL (ref 0–0.1)
BASOPHILS NFR BLD AUTO: 1 % (ref 0–1)
BILIRUB SERPL-MCNC: 0.6 MG/DL (ref 0.2–1)
BUN SERPL-MCNC: 22 MG/DL (ref 5–25)
CALCIUM SERPL-MCNC: 8.7 MG/DL (ref 8.4–10.2)
CHLORIDE SERPL-SCNC: 109 MMOL/L (ref 96–108)
CO2 SERPL-SCNC: 27 MMOL/L (ref 21–32)
CREAT SERPL-MCNC: 0.91 MG/DL (ref 0.6–1.3)
EOSINOPHIL # BLD AUTO: 0.13 THOUSAND/ÂΜL (ref 0–0.61)
EOSINOPHIL NFR BLD AUTO: 3 % (ref 0–6)
ERYTHROCYTE [DISTWIDTH] IN BLOOD BY AUTOMATED COUNT: 13.2 % (ref 11.6–15.1)
GFR SERPL CREATININE-BSD FRML MDRD: 75 ML/MIN/1.73SQ M
GLUCOSE P FAST SERPL-MCNC: 90 MG/DL (ref 65–99)
HCT VFR BLD AUTO: 44.7 % (ref 36.5–49.3)
HGB BLD-MCNC: 15.4 G/DL (ref 12–17)
IMM GRANULOCYTES # BLD AUTO: 0.01 THOUSAND/UL (ref 0–0.2)
IMM GRANULOCYTES NFR BLD AUTO: 0 % (ref 0–2)
LYMPHOCYTES # BLD AUTO: 0.98 THOUSANDS/ÂΜL (ref 0.6–4.47)
LYMPHOCYTES NFR BLD AUTO: 23 % (ref 14–44)
MCH RBC QN AUTO: 33.9 PG (ref 26.8–34.3)
MCHC RBC AUTO-ENTMCNC: 34.5 G/DL (ref 31.4–37.4)
MCV RBC AUTO: 99 FL (ref 82–98)
MONOCYTES # BLD AUTO: 0.41 THOUSAND/ÂΜL (ref 0.17–1.22)
MONOCYTES NFR BLD AUTO: 10 % (ref 4–12)
NEUTROPHILS # BLD AUTO: 2.72 THOUSANDS/ÂΜL (ref 1.85–7.62)
NEUTS SEG NFR BLD AUTO: 63 % (ref 43–75)
NRBC BLD AUTO-RTO: 0 /100 WBCS
PLATELET # BLD AUTO: 109 THOUSANDS/UL (ref 149–390)
PMV BLD AUTO: 10.6 FL (ref 8.9–12.7)
POTASSIUM SERPL-SCNC: 4.1 MMOL/L (ref 3.5–5.3)
PROT SERPL-MCNC: 6.3 G/DL (ref 6.4–8.4)
RBC # BLD AUTO: 4.54 MILLION/UL (ref 3.88–5.62)
SODIUM SERPL-SCNC: 143 MMOL/L (ref 135–147)
WBC # BLD AUTO: 4.28 THOUSAND/UL (ref 4.31–10.16)

## 2024-04-24 PROCEDURE — 85025 COMPLETE CBC W/AUTO DIFF WBC: CPT

## 2024-04-24 PROCEDURE — 36415 COLL VENOUS BLD VENIPUNCTURE: CPT

## 2024-04-24 PROCEDURE — 80053 COMPREHEN METABOLIC PANEL: CPT

## 2024-05-06 ENCOUNTER — OFFICE VISIT (OUTPATIENT)
Dept: FAMILY MEDICINE CLINIC | Facility: CLINIC | Age: 89
End: 2024-05-06
Payer: COMMERCIAL

## 2024-05-06 VITALS
SYSTOLIC BLOOD PRESSURE: 130 MMHG | OXYGEN SATURATION: 98 % | DIASTOLIC BLOOD PRESSURE: 70 MMHG | WEIGHT: 214.2 LBS | BODY MASS INDEX: 29.99 KG/M2 | HEART RATE: 59 BPM | RESPIRATION RATE: 18 BRPM | HEIGHT: 71 IN

## 2024-05-06 DIAGNOSIS — D72.810 LYMPHOPENIA: ICD-10-CM

## 2024-05-06 DIAGNOSIS — G40.209 LOCALIZATION-RELATED FOCAL EPILEPSY WITH COMPLEX PARTIAL SEIZURES (HCC): ICD-10-CM

## 2024-05-06 DIAGNOSIS — C44.719 BASAL CELL CARCINOMA (BCC) OF SKIN OF LEFT LOWER EXTREMITY INCLUDING HIP: Primary | ICD-10-CM

## 2024-05-06 DIAGNOSIS — D18.00 CAVERNOMA: ICD-10-CM

## 2024-05-06 DIAGNOSIS — D69.6 THROMBOCYTOPENIA (HCC): ICD-10-CM

## 2024-05-06 DIAGNOSIS — I10 ESSENTIAL HYPERTENSION: ICD-10-CM

## 2024-05-06 DIAGNOSIS — E78.00 HYPERCHOLESTEREMIA: ICD-10-CM

## 2024-05-06 DIAGNOSIS — G47.33 OBSTRUCTIVE SLEEP APNEA TREATED WITH CONTINUOUS POSITIVE AIRWAY PRESSURE (CPAP): ICD-10-CM

## 2024-05-06 PROBLEM — C44.91 BASAL CELL CARCINOMA OF SKIN: Status: ACTIVE | Noted: 2024-05-06

## 2024-05-06 PROCEDURE — G2211 COMPLEX E/M VISIT ADD ON: HCPCS | Performed by: FAMILY MEDICINE

## 2024-05-06 PROCEDURE — 99214 OFFICE O/P EST MOD 30 MIN: CPT | Performed by: FAMILY MEDICINE

## 2024-05-06 NOTE — PROGRESS NOTES
Assessment/Plan:       Problem List Items Addressed This Visit        Cardiovascular and Mediastinum    Essential hypertension     Blood pressure is very well-controlled.  Continue current regimen of furosemide daily which has helped with his lower extremity swelling.  He is monitoring home blood pressure readings at home.  If he is starting to run above 140/90 with any consistency, he should reach out to the office.  Otherwise, I will see him back in 6 months.            Respiratory    Obstructive sleep apnea treated with continuous positive airway pressure (CPAP)     Continue on CPAP.            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            Hematopoietic and Hemostatic    Thrombocytopenia ([facility])     Monitor platelets in 6 months.  Everything is stable on recent labs.         Relevant Orders    CBC and differential    Comprehensive metabolic panel       Blood    Lymphopenia     Stable mild decrease in his white count.  Repeat in 6 months.         Relevant Orders    CBC and differential    Comprehensive metabolic panel       Other    Cavernoma     He follows annually with [facility] neurology.         Relevant Orders    CBC and differential    Comprehensive metabolic panel    Hypercholesteremia     Lipids were excellent back in July.  Repeat them prior to his follow-up.         Relevant Orders    Comprehensive metabolic panel    Lipid Panel with Direct LDL reflex         Subjective:      Patient ID: Manoj Diamond is a 88 y.o. male.    HPI patient presents today for follow-up of chronic health issues.  Overall, he seems to be doing quite well.  He denies any problems of chest pain, shortness of breath, palpitations or lightheadedness.  He is followed routinely by neurology at [facility] and has been seizure-free.  He continues on  "Angelia.      The following portions of the patient's history were reviewed and updated as appropriate: allergies, current medications, past family history, past medical history, past social history, past surgical history and problem list.      Current Outpatient Medications:   •  DAILY MULTIPLE VITAMINS tablet, Take 1 tablet by mouth daily, Disp: , Rfl:   •  furosemide (LASIX) 20 mg tablet, TAKE 1 TABLET BY MOUTH EVERY DAY, Disp: 30 tablet, Rfl: 11  •  levETIRAcetam (KEPPRA) 1000 MG tablet, Take 1 tablet by mouth 2 (two) times a day, Disp: , Rfl:   •  rosuvastatin (CRESTOR) 5 mg tablet, TAKE 1 TABLET BY MOUTH EVERY DAY, Disp: 90 tablet, Rfl: 3     Review of Systems   Constitutional:  Negative for appetite change, chills, fatigue, fever and unexpected weight change.   HENT:  Negative for trouble swallowing.    Eyes:  Negative for visual disturbance.   Respiratory:  Negative for cough, chest tightness, shortness of breath and wheezing.    Cardiovascular:  Negative for chest pain, palpitations and leg swelling.   Gastrointestinal:  Negative for abdominal distention, abdominal pain, blood in stool, constipation and diarrhea.   Endocrine: Negative for polyuria.   Genitourinary:  Negative for difficulty urinating and flank pain.   Musculoskeletal:  Negative for arthralgias and myalgias.   Skin:  Negative for rash.   Neurological:  Negative for dizziness and light-headedness.   Hematological:  Negative for adenopathy. Does not bruise/bleed easily.   Psychiatric/Behavioral:  Negative for dysphoric mood and sleep disturbance. The patient is not nervous/anxious.          Objective:      /70 (BP Location: Left arm, Patient Position: Sitting, Cuff Size: Large)   Pulse 59   Resp 18   Ht 5' 11\" (1.803 m)   Wt 97.2 kg (214 lb 3.2 oz)   SpO2 98%   BMI 29.87 kg/m²          Physical Exam  Vitals reviewed.   Constitutional:       General: He is not in acute distress.     Appearance: He is well-developed. He is not " diaphoretic.   HENT:      Head: Normocephalic.   Eyes:      General:         Right eye: No discharge.         Left eye: No discharge.      Pupils: Pupils are equal, round, and reactive to light.   Neck:      Thyroid: No thyromegaly.      Trachea: No tracheal deviation.   Cardiovascular:      Rate and Rhythm: Normal rate and regular rhythm.      Heart sounds: Normal heart sounds. No murmur heard.  Pulmonary:      Effort: Pulmonary effort is normal. No respiratory distress.      Breath sounds: No wheezing or rales.   Abdominal:      General: There is no distension.      Palpations: Abdomen is soft.      Tenderness: There is no abdominal tenderness.   Musculoskeletal:         General: Normal range of motion.      Right lower leg: No edema.      Left lower leg: No edema.   Lymphadenopathy:      Cervical: No cervical adenopathy.   Skin:     General: Skin is warm.      Findings: No erythema.   Neurological:      General: No focal deficit present.      Mental Status: He is alert and oriented to person, place, and time.      Gait: Gait normal.   Psychiatric:         Thought Content: Thought content normal.         Judgment: Judgment normal.           Carlos Brandt Jr, MD

## 2024-05-06 NOTE — PATIENT INSTRUCTIONS
1. Basal cell carcinoma (BCC) of skin of left lower extremity including hip  Assessment & Plan:  Continue annual follow-up with advanced dermatology.      2. Cavernoma  Assessment & Plan:  He follows annually with [facility] neurology.    Orders:  -     CBC and differential; Future  -     Comprehensive metabolic panel; Future    3. Thrombocytopenia (HCC)  Assessment & Plan:  Monitor platelets in 6 months.  Everything is stable on recent labs.    Orders:  -     CBC and differential; Future  -     Comprehensive metabolic panel; Future    4. Lymphopenia  Assessment & Plan:  Stable mild decrease in his white count.  Repeat in 6 months.    Orders:  -     CBC and differential; Future  -     Comprehensive metabolic panel; Future    5. Hypercholesteremia  Assessment & Plan:  Lipids were excellent back in July.  Repeat them prior to his follow-up.    Orders:  -     Comprehensive metabolic panel; Future  -     Lipid Panel with Direct LDL reflex; Future    6. Essential hypertension  Assessment & Plan:  Blood pressure is very well-controlled.  Continue current regimen of furosemide daily which has helped with his lower extremity swelling.  He is monitoring home blood pressure readings at home.  If he is starting to run above 140/90 with any consistency, he should reach out to the office.  Otherwise, I will see him back in 6 months.      7. Obstructive sleep apnea treated with continuous positive airway pressure (CPAP)  Assessment & Plan:  Continue on CPAP.      8. 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

## 2024-05-22 ENCOUNTER — OFFICE VISIT (OUTPATIENT)
Dept: SLEEP CENTER | Facility: CLINIC | Age: 89
End: 2024-05-22
Payer: COMMERCIAL

## 2024-05-22 ENCOUNTER — TELEPHONE (OUTPATIENT)
Dept: SLEEP CENTER | Facility: CLINIC | Age: 89
End: 2024-05-22

## 2024-05-22 VITALS
WEIGHT: 211 LBS | HEIGHT: 71 IN | BODY MASS INDEX: 29.54 KG/M2 | HEART RATE: 60 BPM | DIASTOLIC BLOOD PRESSURE: 69 MMHG | SYSTOLIC BLOOD PRESSURE: 145 MMHG

## 2024-05-22 DIAGNOSIS — G47.33 OBSTRUCTIVE SLEEP APNEA TREATED WITH CONTINUOUS POSITIVE AIRWAY PRESSURE (CPAP): Primary | ICD-10-CM

## 2024-05-22 PROCEDURE — 99214 OFFICE O/P EST MOD 30 MIN: CPT | Performed by: NURSE PRACTITIONER

## 2024-05-22 NOTE — PATIENT INSTRUCTIONS
1. Schedule an appointment for set up of PAP equipment  2.  Begin using your equipment every night  3.  Begin cleaning your equipment, as recommended   4.  Contact your medical equipment distributor regarding any questions concerning your PAP equipment  5.  Contact the [facility] with any other questions, prior to next visit, if needed  6.  Schedule compliance follow-up visit in 31-91 days, as required by insurance     Recommendations for Patients, Caregivers, and Health Care Providers  Follow the ’s instructions in the user manualExternal Link Disclaimer, including:  place the CPAP machine on a firm, flat surface  keep the CPAP away from carpet, fabric, or other flammable materials  carefully clean the CPAP machine  empty the CPAP machine’s water reservoir  let the CPAP machine’s heater plate and water tank cool for approximately 15 minutes before removing the tank to reduce the risk of burns. You could be burned if you touch the heater plate, come in contact with the heated water, or touch the humidifier water tank pan.  Inspect and examine the CPAP machine before and after each use for unusual smells or changes in its appearance. Some problems may only be noticeable when the machine is running, so pay attention to any differences in the CPAP machine as you prepare for bed, before you fall asleep.  Unplug the CPAP machine and do not use it if:  you smell burning, smoke, or any unusual odors,  there is a change in the appearance of the CPAP machine,  there are unexplained changes to the CPAP machine’s performance,  water is spilled into the CPAP machine,  you hear unusual sounds coming from the CPAP machine.  If you are unable to use your CPAP device due to these issues, discuss your individual health situation with a health care provider, such as your primary care physician or sleep doctor.  At this time, if you are not experiencing these issues, then the FDA does not recommend  discontinuing use of these machines.  Report any concerns about the CPAP machine to the FDA, including unusual smells, sounds, or changes in appearance, and report any concerns to [employer] Link Disclaimer.      Nursing Support:  When: Monday through Friday 7A-5PM except holidays  Where: Our direct line is [phone].    If you are having a true emergency please call 911.  In the event that the line is busy or it is after hours please leave a voice message and we will return your call.  Please speak clearly, leaving your full name, birth date, best number to reach you and the reason for your call.   Medication refills: We will need the name of the medication, the dosage, the ordering provider, whether you get a 30 or 90 day refill, and the pharmacy name and address.  Medications will be ordered by the provider only.  Nurses cannot call in prescriptions.  Please allow 7 days for medication refills.  Physician requested updates: If your provider requested that you call with an update after starting medication, please be ready to provide us the medication and dosage, what time you take your medication, the time you attempt to fall asleep, time you fall asleep, when you wake up, and what time you get out of bed.  Sleep Study Results: We will contact you with sleep study results and/or next steps after the physician has reviewed your testing.

## 2024-05-22 NOTE — PROGRESS NOTES
Progress Note - [facility]   Manoj Diamond 88 y.o. male   :1935, MRN: [id number]  2024      Follow Up Evaluation / Problem:  Moderate to Severe Obstructive Sleep Apnea  Overweight  HTN  Hx of epilepsy - controlled with Keppra      Thank you for the opportunity of participating in the evaluation and care of this patient in the Sleep Clinic at [facility].      HPI: Manoj Diamond is a 88 y.o. year old male.  The patient presents for follow up of obstructive sleep apnea.  He has been treated for SHON, using CPAP equipment since .  A split night study was planned for re-qualification of equipment, however, respiratory events occurred later in the night during the study, resulting in a diagnostic study only, with AHI of 19.2, worsening to 72 when supine with oxygen guru of 87%.  A CPAP titration study was then completed with titration to 9cm.  He has been successfully using the equipment since set up in early 2018.  He received the DreamStation 2 from the [employer].  He presents today to review compliance and effectiveness of PAP therapy.      Current Outpatient Medications:     DAILY MULTIPLE VITAMINS tablet, Take 1 tablet by mouth daily, Disp: , Rfl:     furosemide (LASIX) 20 mg tablet, TAKE 1 TABLET BY MOUTH EVERY DAY, Disp: 30 tablet, Rfl: 11    levETIRAcetam (KEPPRA) 1000 MG tablet, Take 1 tablet by mouth 2 (two) times a day, Disp: , Rfl:     rosuvastatin (CRESTOR) 5 mg tablet, TAKE 1 TABLET BY MOUTH EVERY DAY, Disp: 90 tablet, Rfl: 3    How likely are you to doze off or fall asleep in the following situations, in contrast to feeling just tired?  Sitting and reading: Slight chance of dozing  Watching TV: Would never doze  Sitting, inactive in a public place (e.g. a theatre or a meeting): Slight chance of dozing  As a passenger in a car for an hour without a break: Would never doze  Lying down to rest in the afternoon when circumstances  "permit: Would never doze  Sitting and talking to someone: Would never doze  Sitting quietly after a lunch without alcohol: Would never doze  In a car, while stopped for a few minutes in traffic: Would never doze  Total score: 2              Vitals:    05/22/24 0948   BP: 145/69   BP Location: Left arm   Patient Position: Sitting   Cuff Size: Large   Pulse: 60   Weight: 95.7 kg (211 lb)   Height: 5' 11\" (1.803 m)       Body mass index is 29.43 kg/m².       EPWORTH SLEEPINESS SCORE  Total score: 2      Past History Since Last Sleep Center Visit:   He denies any changes to his health since his last visit.  Weight has been  stable.  He continues to use CPAP equipment nightly.  He has not had any issues or concerns with the DreamStation 2.  He feels the nasal mask and pressure setting are comfortable.  He denies frequent night time awakenings or daytime sleepiness.        The review of systems and following portions of the patient's history were reviewed and updated as appropriate: allergies, current medications, past family history, past medical history, past social history, past surgical history, and problem list.      OBJECTIVE  Equipment set up date:  1/9/2018 - received DreamStation 2  PAP Pressure: Nasal CPAP set to deliver 9 cm of water pressure  Type of mask used: nasal  DME Provider: [employer]    Physical Exam:     General Appearance:   Alert, cooperative, no distress, appears stated age, overweight     Lungs:    Heart:  Clear to auscultation bilaterally, respirations unlabored      Regular rate and rhythm, S1 and S2 normal, no murmur, rub or gallop              ASSESSMENT / PLAN    1. Obstructive sleep apnea treated with continuous positive airway pressure (CPAP)  Cpap DME          Counseling / Coordination of Care  I have spent a total time of 30 minutes on 05/22/24 in caring for this patient including Risks and benefits of tx options, Instructions for management, Patient and family education, Importance of " tx compliance, Risk factor reductions, Impressions, Counseling / Coordination of care, Documenting in the medical record, and Reviewing / ordering tests, medicine, procedures  .    Today I reviewed the patient's compliance data.  he has been able to use the equipment 100% of all days recorded.  Average usage was 4 or more hours 100% of all days recorded.  The estimated AHI is 0.5 abnormal breathing events per hour.  He is at goal for hours of use and effectiveness of treatment.  The patient feels they benefit from the use of PAP equipment and would like to continue PAP therapy.  Response to treatment has been excellent.      We discussed that there is a warning on the [employer] website regarding increased incidences of the equipment overheating.  He has not had any issues with this, but is interested in having the equipment replaced.    He will continue using his current equipment at the settings noted above.  He will be scheduled for set up of new equipment and return in 31-91 days to review compliance and effectiveness of treatment. I have asked the patient to contact the Sleep Disorders Center if he encounters any difficulties prior to that time.    The following instructions have been given to the patient today:    Patient Instructions   1. Schedule an appointment for set up of PAP equipment  2.  Begin using your equipment every night  3.  Begin cleaning your equipment, as recommended   4.  Contact your medical equipment distributor regarding any questions concerning your PAP equipment  5.  Contact the Sleep Disorders Center with any other questions, prior to next visit, if needed  6.  Schedule compliance follow-up visit in 31-91 days, as required by insurance     Recommendations for Patients, Caregivers, and Health Care Providers  Follow the ’s instructions in the user manualExternal Link Disclaimer, including:  place the CPAP machine on a firm, flat surface  keep the CPAP away from carpet, fabric, or  other flammable materials  carefully clean the CPAP machine  empty the CPAP machine’s water reservoir  let the CPAP machine’s heater plate and water tank cool for approximately 15 minutes before removing the tank to reduce the risk of burns. You could be burned if you touch the heater plate, come in contact with the heated water, or touch the humidifier water tank pan.  Inspect and examine the CPAP machine before and after each use for unusual smells or changes in its appearance. Some problems may only be noticeable when the machine is running, so pay attention to any differences in the CPAP machine as you prepare for bed, before you fall asleep.  Unplug the CPAP machine and do not use it if:  you smell burning, smoke, or any unusual odors,  there is a change in the appearance of the CPAP machine,  there are unexplained changes to the CPAP machine’s performance,  water is spilled into the CPAP machine,  you hear unusual sounds coming from the CPAP machine.  If you are unable to use your CPAP device due to these issues, discuss your individual health situation with a health care provider, such as your primary care physician or sleep doctor.  At this time, if you are not experiencing these issues, then the FDA does not recommend discontinuing use of these machines.  Report any concerns about the CPAP machine to the FDA, including unusual smells, sounds, or changes in appearance, and report any concerns to [employer] Link Disclaimer.      Nursing Support:  When: Monday through Friday 7A-5PM except holidays  Where: Our direct line is [phone].    If you are having a true emergency please call 911.  In the event that the line is busy or it is after hours please leave a voice message and we will return your call.  Please speak clearly, leaving your full name, birth date, best number to reach you and the reason for your call.   Medication refills: We will need the name of the medication, the dosage, the ordering  provider, whether you get a 30 or 90 day refill, and the pharmacy name and address.  Medications will be ordered by the provider only.  Nurses cannot call in prescriptions.  Please allow 7 days for medication refills.  Physician requested updates: If your provider requested that you call with an update after starting medication, please be ready to provide us the medication and dosage, what time you take your medication, the time you attempt to fall asleep, time you fall asleep, when you wake up, and what time you get out of bed.  Sleep Study Results: We will contact you with sleep study results and/or next steps after the physician has reviewed your testing.      DONOVAN Adams  [facility] [place]

## 2024-05-31 LAB
DME PARACHUTE DELIVERY DATE ACTUAL: NORMAL
DME PARACHUTE DELIVERY DATE EXPECTED: NORMAL
DME PARACHUTE DELIVERY DATE REQUESTED: NORMAL
DME PARACHUTE ITEM DESCRIPTION: NORMAL
DME PARACHUTE ORDER STATUS: NORMAL
DME PARACHUTE SUPPLIER NAME: NORMAL
DME PARACHUTE SUPPLIER PHONE: NORMAL

## 2024-07-31 ENCOUNTER — APPOINTMENT (OUTPATIENT)
Dept: LAB | Facility: CLINIC | Age: 89
End: 2024-07-31
Payer: COMMERCIAL

## 2024-07-31 DIAGNOSIS — D18.00 CAVERNOMA: ICD-10-CM

## 2024-07-31 DIAGNOSIS — D69.6 THROMBOCYTOPENIA (HCC): ICD-10-CM

## 2024-07-31 DIAGNOSIS — D72.810 LYMPHOPENIA: ICD-10-CM

## 2024-07-31 DIAGNOSIS — E78.00 HYPERCHOLESTEREMIA: ICD-10-CM

## 2024-07-31 LAB
ALBUMIN SERPL BCG-MCNC: 4.1 G/DL (ref 3.5–5)
ALP SERPL-CCNC: 68 U/L (ref 34–104)
ALT SERPL W P-5'-P-CCNC: 16 U/L (ref 7–52)
ANION GAP SERPL CALCULATED.3IONS-SCNC: 9 MMOL/L (ref 4–13)
AST SERPL W P-5'-P-CCNC: 21 U/L (ref 13–39)
BASOPHILS # BLD AUTO: 0.03 THOUSANDS/ÂΜL (ref 0–0.1)
BASOPHILS NFR BLD AUTO: 1 % (ref 0–1)
BILIRUB SERPL-MCNC: 0.8 MG/DL (ref 0.2–1)
BUN SERPL-MCNC: 22 MG/DL (ref 5–25)
CALCIUM SERPL-MCNC: 9 MG/DL (ref 8.4–10.2)
CHLORIDE SERPL-SCNC: 107 MMOL/L (ref 96–108)
CHOLEST SERPL-MCNC: 137 MG/DL
CO2 SERPL-SCNC: 25 MMOL/L (ref 21–32)
CREAT SERPL-MCNC: 0.95 MG/DL (ref 0.6–1.3)
EOSINOPHIL # BLD AUTO: 0.14 THOUSAND/ÂΜL (ref 0–0.61)
EOSINOPHIL NFR BLD AUTO: 3 % (ref 0–6)
ERYTHROCYTE [DISTWIDTH] IN BLOOD BY AUTOMATED COUNT: 13.1 % (ref 11.6–15.1)
GFR SERPL CREATININE-BSD FRML MDRD: 71 ML/MIN/1.73SQ M
GLUCOSE P FAST SERPL-MCNC: 97 MG/DL (ref 65–99)
HCT VFR BLD AUTO: 44.9 % (ref 36.5–49.3)
HDLC SERPL-MCNC: 62 MG/DL
HGB BLD-MCNC: 15.1 G/DL (ref 12–17)
IMM GRANULOCYTES # BLD AUTO: 0.01 THOUSAND/UL (ref 0–0.2)
IMM GRANULOCYTES NFR BLD AUTO: 0 % (ref 0–2)
LDLC SERPL CALC-MCNC: 57 MG/DL (ref 0–100)
LYMPHOCYTES # BLD AUTO: 0.94 THOUSANDS/ÂΜL (ref 0.6–4.47)
LYMPHOCYTES NFR BLD AUTO: 22 % (ref 14–44)
MCH RBC QN AUTO: 33.6 PG (ref 26.8–34.3)
MCHC RBC AUTO-ENTMCNC: 33.6 G/DL (ref 31.4–37.4)
MCV RBC AUTO: 100 FL (ref 82–98)
MONOCYTES # BLD AUTO: 0.47 THOUSAND/ÂΜL (ref 0.17–1.22)
MONOCYTES NFR BLD AUTO: 11 % (ref 4–12)
NEUTROPHILS # BLD AUTO: 2.78 THOUSANDS/ÂΜL (ref 1.85–7.62)
NEUTS SEG NFR BLD AUTO: 63 % (ref 43–75)
NRBC BLD AUTO-RTO: 0 /100 WBCS
PLATELET # BLD AUTO: 110 THOUSANDS/UL (ref 149–390)
PMV BLD AUTO: 10.4 FL (ref 8.9–12.7)
POTASSIUM SERPL-SCNC: 4.3 MMOL/L (ref 3.5–5.3)
PROT SERPL-MCNC: 6.5 G/DL (ref 6.4–8.4)
RBC # BLD AUTO: 4.49 MILLION/UL (ref 3.88–5.62)
SODIUM SERPL-SCNC: 141 MMOL/L (ref 135–147)
TRIGL SERPL-MCNC: 89 MG/DL
WBC # BLD AUTO: 4.37 THOUSAND/UL (ref 4.31–10.16)

## 2024-07-31 PROCEDURE — 80053 COMPREHEN METABOLIC PANEL: CPT

## 2024-07-31 PROCEDURE — 80061 LIPID PANEL: CPT

## 2024-07-31 PROCEDURE — 85025 COMPLETE CBC W/AUTO DIFF WBC: CPT

## 2024-07-31 PROCEDURE — 36415 COLL VENOUS BLD VENIPUNCTURE: CPT

## 2024-08-09 ENCOUNTER — OFFICE VISIT (OUTPATIENT)
Dept: FAMILY MEDICINE CLINIC | Facility: CLINIC | Age: 89
End: 2024-08-09
Payer: COMMERCIAL

## 2024-08-09 VITALS
WEIGHT: 209 LBS | HEART RATE: 66 BPM | DIASTOLIC BLOOD PRESSURE: 74 MMHG | BODY MASS INDEX: 29.26 KG/M2 | OXYGEN SATURATION: 99 % | SYSTOLIC BLOOD PRESSURE: 122 MMHG | HEIGHT: 71 IN

## 2024-08-09 DIAGNOSIS — Z00.00 MEDICARE ANNUAL WELLNESS VISIT, SUBSEQUENT: Primary | ICD-10-CM

## 2024-08-09 DIAGNOSIS — C44.719 BASAL CELL CARCINOMA (BCC) OF SKIN OF LEFT LOWER EXTREMITY INCLUDING HIP: ICD-10-CM

## 2024-08-09 DIAGNOSIS — M46.1 SI (SACROILIAC) JOINT INFLAMMATION (HCC): ICD-10-CM

## 2024-08-09 DIAGNOSIS — D18.00 CAVERNOMA: ICD-10-CM

## 2024-08-09 DIAGNOSIS — G40.209 LOCALIZATION-RELATED FOCAL EPILEPSY WITH COMPLEX PARTIAL SEIZURES (HCC): ICD-10-CM

## 2024-08-09 DIAGNOSIS — I10 ESSENTIAL HYPERTENSION: ICD-10-CM

## 2024-08-09 DIAGNOSIS — D69.6 THROMBOCYTOPENIA (HCC): ICD-10-CM

## 2024-08-09 DIAGNOSIS — D72.810 LYMPHOPENIA: ICD-10-CM

## 2024-08-09 DIAGNOSIS — E78.00 HYPERCHOLESTEREMIA: ICD-10-CM

## 2024-08-09 DIAGNOSIS — G47.33 OBSTRUCTIVE SLEEP APNEA TREATED WITH CONTINUOUS POSITIVE AIRWAY PRESSURE (CPAP): ICD-10-CM

## 2024-08-09 PROCEDURE — 99214 OFFICE O/P EST MOD 30 MIN: CPT | Performed by: FAMILY MEDICINE

## 2024-08-09 PROCEDURE — G0439 PPPS, SUBSEQ VISIT: HCPCS | Performed by: FAMILY MEDICINE

## 2024-08-09 RX ORDER — ACETAMINOPHEN 500 MG
500 TABLET ORAL 2 TIMES DAILY
COMMUNITY

## 2024-08-09 NOTE — ASSESSMENT & PLAN NOTE
Platelets remain stable.  Orders:    CBC and differential; Future    Comprehensive metabolic panel; Future

## 2024-08-09 NOTE — ASSESSMENT & PLAN NOTE
Well-controlled at this time.  Continue furosemide.  This is somewhat atypical for hypertension control but he seems to be doing well with  Orders:    CBC and differential; Future    Comprehensive metabolic panel; Future    Lipid Panel with Direct LDL reflex; Future

## 2024-08-09 NOTE — PROGRESS NOTES
Ambulatory Visit  Name: Manoj Diamond      : 1935      MRN: [id number]  Encounter Provider: Carlos Brandt Jr, MD  Encounter Date: 2024   Encounter department: [facility]  Assessment & Plan  Medicare annual wellness visit, subsequent  He continues to do quite well.  Is up-to-date on vaccines.       Essential hypertension  Well-controlled at this time.  Continue furosemide.  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  Continue Keppra       Thrombocytopenia (HCC)  Platelets remain stable.  Orders:    CBC and differential; Future    Comprehensive metabolic panel; Future    SI (sacroiliac) joint inflammation (HCC)  Low back pain stable at this time       Lymphopenia  Monitor CBC  Orders:    CBC and differential; Future    Comprehensive metabolic panel; Future    Cavernoma  He has a history of seizure and remains stable.       Basal cell carcinoma (BCC) of skin of left lower extremity including hip  He follows annually with dermatology.       Hypercholesteremia  Continue rosuvastatin.  Orders:    CBC and differential; Future    Comprehensive metabolic panel; Future    Lipid Panel with Direct LDL reflex; Future       Depression Screening and Follow-up Plan: Patient was screened for depression during today's encounter. They screened negative with a PHQ-2 score of 0.      Preventive health issues were discussed with patient, and age appropriate screening tests were ordered as noted in patient's After Visit Summary. Personalized health advice and appropriate referrals for health  education or preventive services given if needed, as noted in patient's After Visit Summary.    History of Present Illness     HPI   Patient presents today for follow-up of chronic health issues.  Overall, he continues to quite well.  He remains very active with yard work and walking.  He has some chronic arthralgia in particular right knee pain but he sees Ortho for this.  He has a history of hyperlipidemia remains compliant with statin therapy.  He denies any present headache, vision changes, chest pain, shortness of breath or palpitations.    Patient Care Team:  Carlos Roberts Jr., MD as PCP - General  Carlos Roberts Jr., MD as PCP - PCP-[facility] ([facility])    Review of Systems   Constitutional:  Negative for appetite change, chills, fatigue, fever and unexpected weight change.   HENT:  Negative for trouble swallowing.    Eyes:  Negative for visual disturbance.   Respiratory:  Negative for cough, chest tightness, shortness of breath and wheezing.    Cardiovascular:  Negative for chest pain, palpitations and leg swelling.   Gastrointestinal:  Negative for abdominal distention, abdominal pain, blood in stool, constipation and diarrhea.   Endocrine: Negative for polyuria.   Genitourinary:  Negative for difficulty urinating and flank pain.   Musculoskeletal:  Positive for arthralgias. Negative for myalgias.   Skin:  Negative for rash.   Neurological:  Negative for dizziness and light-headedness.   Hematological:  Negative for adenopathy. Does not bruise/bleed easily.   Psychiatric/Behavioral:  Negative for dysphoric mood and sleep disturbance. The patient is not nervous/anxious.      Medical History Reviewed by provider this encounter:       Annual Wellness Visit Questionnaire       Health Risk Assessment:   Patient rates overall health as good. Patient feels that their physical health rating is same. Patient is satisfied with their life. Eyesight was rated as same. Hearing was rated as same. Patient feels  that their emotional and mental health rating is same. Patients states they are never, rarely angry. Patient states they are never, rarely unusually tired/fatigued. Pain experienced in the last 7 days has been some. Patient's pain rating has been 4/10. Patient states that he has experienced no weight loss or gain in last 6 months. R knee - sees ortho for injections.    Depression Screening:   PHQ-2 Score: 0      Fall Risk Screening:   In the past year, patient has experienced: no history of falling in past year      Home Safety:  Patient does not have trouble with stairs inside or outside of their home. Patient has working smoke alarms and has working carbon monoxide detector. Home safety hazards include: none.     Nutrition:   Current diet is Regular.     Medications:   Patient is currently taking over-the-counter supplements. OTC medications include: see medication list. Patient is able to manage medications.     Activities of Daily Living (ADLs)/Instrumental Activities of Daily Living (IADLs):   Walk and transfer into and out of bed and chair?: Yes  Dress and groom yourself?: Yes    Bathe or shower yourself?: Yes    Feed yourself? Yes  Do your laundry/housekeeping?: Yes  Manage your money, pay your bills and track your expenses?: Yes  Make your own meals?: Yes    Do your own shopping?: Yes    Previous Hospitalizations:   Any hospitalizations or ED visits within the last 12 months?: No      Advance Care Planning:   Living will: Yes    Durable POA for healthcare: Yes    Advanced directive: Yes    Advanced directive counseling given: Yes    End of Life Decisions reviewed with patient: Yes      Cognitive Screening:   Provider or family/friend/caregiver concerned regarding cognition?: No    PREVENTIVE SCREENINGS      Cardiovascular Screening:    General: Screening Not Indicated and History Lipid Disorder      Diabetes Screening:     General: Screening Current      Colorectal Cancer Screening:     General: Screening  Not Indicated      Prostate Cancer Screening:    General: Screening Not Indicated      Osteoporosis Screening:    General: Screening Not Indicated      Abdominal Aortic Aneurysm (AAA) Screening:    Risk factors include: tobacco use        General: Screening Not Indicated      Lung Cancer Screening:     General: Screening Not Indicated      Hepatitis C Screening:    General: Screening Not Indicated    Screening, Brief Intervention, and Referral to Treatment (SBIRT)    Screening  Typical number of drinks in a day: 1  Typical number of drinks in a week: 5  Interpretation: Low risk drinking behavior.    AUDIT-C Screenin) How often did you have a drink containing alcohol in the past year? 4 or more times a week  2) How many drinks did you have on a typical day when you were drinking in the past year? 1 to 2  3) How often did you have 6 or more drinks on one occasion in the past year? never    AUDIT-C Score: 4  Interpretation: Score 4-12 (male): POSITIVE screen for alcohol misuse    AUDIT Screenin) How often during the last year have you found that you were not able to stop drinking once you had started? 0 - never  5) How often during the last year have you failed to do what was normally expected from you because of drinking? 0 - never  6) How often during the last year have you needed a first drink in the morning to get yourself going after a heavy drinking session? 0 - never  7) How often during the last year have you had a feeling of guilt or remorse after drinking? 0 - never  8) How often during the last year have you been unable to remember what happened the night before because you had been drinking? 0 - never  9) Have you or someone else been injured as a result of your drinking? 0 - no  10) Has a relative or friend or a doctor or another health worker been concerned about your drinking or suggested you cut down? 0 - no    AUDIT Score: 4  Interpretation: Low risk alcohol consumption    Single Item Drug  "Screening:  How often have you used an illegal drug (including marijuana) or a prescription medication for non-medical reasons in the past year? never    Single Item Drug Screen Score: 0  Interpretation: Negative screen for possible drug use disorder    Brief Intervention  Alcohol & drug use screenings were reviewed. No concerns regarding substance use disorder identified.     Social Determinants of Health     Financial Resource Strain: Low Risk  (8/2/2023)    Overall Financial Resource Strain (CARDIA)     Difficulty of Paying Living Expenses: Not hard at all   Food Insecurity: No Food Insecurity (8/2/2024)    Hunger Vital Sign     Worried About Running Out of Food in the Last Year: Never true     Ran Out of Food in the Last Year: Never true   Transportation Needs: No Transportation Needs (8/2/2024)    PRAPARE - Transportation     Lack of Transportation (Medical): No     Lack of Transportation (Non-Medical): No   Housing Stability: Low Risk  (8/2/2024)    Housing Stability Vital Sign     Unable to Pay for Housing in the Last Year: No     Number of Times Moved in the Last Year: 0     Homeless in the Last Year: No   Utilities: Not At Risk (8/2/2024)    [facility] Utilities     Threatened with loss of utilities: No     No results found.    Objective     /74   Pulse 66   Ht 5' 11\" (1.803 m)   Wt 94.8 kg (209 lb)   SpO2 99%   BMI 29.15 kg/m²     Physical Exam  Constitutional:       General: He is not in acute distress.     Appearance: Normal appearance. He is well-developed. He is not diaphoretic.   HENT:      Head: Normocephalic.      Right Ear: External ear normal.      Left Ear: External ear normal.      Nose: Nose normal.   Eyes:      General:         Right eye: No discharge.         Left eye: No discharge.      Conjunctiva/sclera: Conjunctivae normal.      Pupils: Pupils are equal, round, and reactive to light.   Neck:      Thyroid: No thyromegaly.      Trachea: No tracheal deviation.   Cardiovascular:      Rate " and Rhythm: Normal rate and regular rhythm.      Heart sounds: Normal heart sounds. No murmur heard.     No friction rub.   Pulmonary:      Effort: Pulmonary effort is normal. No respiratory distress.      Breath sounds: Normal breath sounds. No wheezing.   Chest:      Chest wall: No tenderness.   Abdominal:      General: There is no distension.      Palpations: There is no mass.      Tenderness: There is no abdominal tenderness. There is no guarding or rebound.      Hernia: No hernia is present.   Musculoskeletal:         General: No swelling or deformity.      Cervical back: Normal range of motion.      Right lower leg: No edema.      Left lower leg: No edema.   Skin:     Findings: No erythema or rash.   Neurological:      General: No focal deficit present.      Mental Status: He is alert.      Cranial Nerves: No cranial nerve deficit.      Coordination: Coordination normal.   Psychiatric:         Thought Content: Thought content normal.

## 2024-08-09 NOTE — ASSESSMENT & PLAN NOTE
Continue rosuvastatin.  Orders:    CBC and differential; Future    Comprehensive metabolic panel; Future    Lipid Panel with Direct LDL reflex; Future

## 2024-09-05 ENCOUNTER — TELEPHONE (OUTPATIENT)
Age: 89
End: 2024-09-05

## 2024-09-05 DIAGNOSIS — G89.29 CHRONIC PAIN OF RIGHT KNEE: ICD-10-CM

## 2024-09-05 DIAGNOSIS — M17.11 PRIMARY OSTEOARTHRITIS OF RIGHT KNEE: Primary | ICD-10-CM

## 2024-09-05 DIAGNOSIS — M25.561 CHRONIC PAIN OF RIGHT KNEE: ICD-10-CM

## 2024-09-05 NOTE — TELEPHONE ENCOUNTER
Caller: Self  Doctor/office: [facility]  #: [phone]       called to start auth/delivery for VISCO(Gel) injections.    Body Part: right knee  Pain Level (scale 1-10): 7-8/10  Date of last Gel Injection: 3/15/24    Educated patient on Visco procedure. Auth team will review insurance and process the request appropriately. Patient will be contacted if the have any questions and when ready to schedule.

## 2024-10-04 ENCOUNTER — PROCEDURE VISIT (OUTPATIENT)
Dept: OBGYN CLINIC | Facility: MEDICAL CENTER | Age: 89
End: 2024-10-04
Payer: COMMERCIAL

## 2024-10-04 VITALS
SYSTOLIC BLOOD PRESSURE: 118 MMHG | HEIGHT: 71 IN | WEIGHT: 208 LBS | BODY MASS INDEX: 29.12 KG/M2 | HEART RATE: 62 BPM | DIASTOLIC BLOOD PRESSURE: 70 MMHG

## 2024-10-04 DIAGNOSIS — M17.11 PRIMARY OSTEOARTHRITIS OF RIGHT KNEE: Primary | ICD-10-CM

## 2024-10-04 PROCEDURE — 20610 DRAIN/INJ JOINT/BURSA W/O US: CPT | Performed by: ORTHOPAEDIC SURGERY

## 2024-10-04 NOTE — PROGRESS NOTES
Assessment/Plan:  1. Primary osteoarthritis of right knee      Orders Placed This Encounter   Procedures    Large joint arthrocentesis     Patient has severe right knee osteoarthritis.   Can take Tylenol 1,000mg by mouth every 8 hours as needed for pain.  Do not exceed 3,000mg per day.    Patient is here for his first injection of Synvisc into the right knee.   Physical exam of the knee shows no effusion no ecchymosis.  Patient tolerated procedure follow up 1 week for second injection of Synvisc to right knee.    Return in about 1 week (around 10/11/2024) for 2/3 Synvisc gel injection..    I answered all of the patient's questions during the visit and provided education of the patient's condition during the visit.  The patient verbalized understanding of the information given and agrees with the plan.  This note was dictated using [employer]ap[employer] software.  It may contain errors including improperly dictated words.  Please contact physician directly for any questions.    Subjective   Chief Complaint:   Chief Complaint   Patient presents with    Right Knee - Follow-up    Left Knee - Follow-up       HPI  Manoj Diamond is a 88 y.o. male who presents for follow up for severe right knee osteoarthritis.  Patient was last seen 3/15/2024 patient received 3/3 Synvisc injection into the right knee.  Patient states he received about 5-6 months of relief with injection.  Patient states within the past couple of weeks his pain is starting to return.    Mechanism of Injury: NA  Pain Description: right knee pain, located over the medial aspect of the knee, described as sharp, overall relieved from gel series   Palliating Factors: rest  Provoking Factors: walking  Associated Symptoms: none  Medications: tylenol as needed for pain   Able to take NSAIDs? If not, why: no  Physical Therapy or Home Exercises: did PT in the past.  Injections: received right knee synvisc series on 3/15/24 and reports very good relief which lasted until the last few  weeks when relief started to wear off  Bracing: none  Previous Surgery: none  Miscellaneous: NA      Review of Systems  ROS:    See HPI for musculoskeletal review.   All other systems reviewed are negative     History:  Past Medical History:   Diagnosis Date    Class 2 severe obesity due to excess calories with serious comorbidity and body mass index (BMI) of 39.0 to 39.9 in adult ([facility]) 3/22/2018    Convulsions, epileptic ([facility]) 5/3/2012    Description: Onset .  Absence seizures in  when switching from dilantin to keppra    DVT (deep venous thrombosis) ([facility])     Occurred after fall with fracture patella    Hyperlipidemia     Hypertension     Seizures ([facility])      Past Surgical History:   Procedure Laterality Date    BLEPHAROPLASTY      last assessed: 2017; upper lid w/ excessive skin     COLONOSCOPY W/ POLYPECTOMY      MOUTH SURGERY      gingival incision due to Dilantin therapy      Social History   Social History     Substance and Sexual Activity   Alcohol Use Yes    Alcohol/week: 5.0 standard drinks of alcohol    Types: 5 Standard drinks or equivalent per week    Comment: Social      Social History     Substance and Sexual Activity   Drug Use No     Social History     Tobacco Use   Smoking Status Former    Current packs/day: 0.00    Average packs/day: 1 pack/day for 10.0 years (10.0 ttl pk-yrs)    Types: Cigarettes    Start date: 1950    Quit date:     Years since quittin.8   Smokeless Tobacco Never   Tobacco Comments    Quit in the 's     Family History:   Family History   Problem Relation Age of Onset    Stroke Mother     Heart failure Father     Stroke Family         syndrome       Current Outpatient Medications on File Prior to Visit   Medication Sig Dispense Refill    acetaminophen (TYLENOL) 500 mg tablet Take 500 mg by mouth 2 (two) times a day      DAILY MULTIPLE VITAMINS tablet Take 1 tablet by mouth daily      furosemide (LASIX) 20 mg tablet TAKE 1 TABLET BY MOUTH EVERY DAY 30  "tablet 11    levETIRAcetam (KEPPRA) 1000 MG tablet Take 1 tablet by mouth 2 (two) times a day      rosuvastatin (CRESTOR) 5 mg tablet TAKE 1 TABLET BY MOUTH EVERY DAY 90 tablet 3     No current facility-administered medications on file prior to visit.     No Known Allergies     Objective     /70   Pulse 62   Ht 5' 11\" (1.803 m)   Wt 94.3 kg (208 lb)   BMI 29.01 kg/m²      PE:  AAOx 3  WDWN  Hearing intact, no drainage from eyes  no audible wheezing  no abdominal distension  LE compartments soft, skin intact    Ortho Exam:  right Knee:   No erythema  no swelling  no effusion  no warmth  AROM: 0-115  Stable to varus/valgus stress    Large joint arthrocentesis: R knee  [place] Protocol:  procedure performed by consultantConsent: Verbal consent obtained.  Risks and benefits: risks, benefits and alternatives were discussed  Consent given by: patient  Patient understanding: patient states understanding of the procedure being performed  Site marked: the operative site was marked  Patient identity confirmed: verbally with patient  Supporting Documentation  Indications: pain   Procedure Details  Location: knee - R knee  Needle size: 22 G  Ultrasound guidance: no  Approach: anterolateral  Medications administered: 16 mg hylan 16 MG/2ML    Patient tolerance: patient tolerated the procedure well with no immediate complications  Dressing:  Sterile dressing applied            Scribe Attestation      I,:  Collin Payne am acting as a scribe while in the presence of the attending physician.:       I,:  Mellissa Lisa DO personally performed the services described in this documentation    as scribed in my presence.:                      "

## 2024-10-11 ENCOUNTER — PROCEDURE VISIT (OUTPATIENT)
Dept: OBGYN CLINIC | Facility: MEDICAL CENTER | Age: 89
End: 2024-10-11
Payer: COMMERCIAL

## 2024-10-11 VITALS
HEIGHT: 71 IN | HEART RATE: 69 BPM | DIASTOLIC BLOOD PRESSURE: 66 MMHG | WEIGHT: 207 LBS | BODY MASS INDEX: 28.98 KG/M2 | SYSTOLIC BLOOD PRESSURE: 118 MMHG

## 2024-10-11 DIAGNOSIS — M17.11 PRIMARY OSTEOARTHRITIS OF RIGHT KNEE: Primary | ICD-10-CM

## 2024-10-11 PROCEDURE — 20610 DRAIN/INJ JOINT/BURSA W/O US: CPT | Performed by: PHYSICIAN ASSISTANT

## 2024-10-11 NOTE — PROGRESS NOTES
Patient has severe right knee osteoarthritis.   Patient received right knee synvisc injection 2/3 today.   Tolerated the procedure well.   Post injection instructions reviewed.   Follow up 1 week for injection 3.         Large joint arthrocentesis: R knee  [place] Protocol:  procedure performed by consultantConsent: Verbal consent obtained.  Risks and benefits: risks, benefits and alternatives were discussed  Consent given by: patient  Site marked: the operative site was marked  Supporting Documentation  Indications: pain   Procedure Details  Location: knee - R knee  Preparation: Patient was prepped and draped in the usual sterile fashion  Needle size: 22 G  Ultrasound guidance: no  Approach: anterolateral  Medications administered: 30 mg sodium hyaluronate 30 mg/2 mL  Specialty Pharmacy Supplied: received medications from pharmacy  Patient tolerance: patient tolerated the procedure well with no immediate complications  Dressing:  Sterile dressing applied

## 2024-10-18 ENCOUNTER — PROCEDURE VISIT (OUTPATIENT)
Dept: OBGYN CLINIC | Facility: MEDICAL CENTER | Age: 89
End: 2024-10-18
Payer: COMMERCIAL

## 2024-10-18 VITALS
HEART RATE: 68 BPM | HEIGHT: 71 IN | DIASTOLIC BLOOD PRESSURE: 69 MMHG | SYSTOLIC BLOOD PRESSURE: 127 MMHG | BODY MASS INDEX: 28.87 KG/M2 | WEIGHT: 206.2 LBS

## 2024-10-18 DIAGNOSIS — M17.11 PRIMARY OSTEOARTHRITIS OF RIGHT KNEE: Primary | ICD-10-CM

## 2024-10-18 PROCEDURE — 20610 DRAIN/INJ JOINT/BURSA W/O US: CPT | Performed by: PHYSICIAN ASSISTANT

## 2024-10-18 NOTE — PROGRESS NOTES
Patient has severe right knee osteoarthritis.   Patient is a surgical candidate.   Patient received right knee orthovisc injection 3/3 today.   Tolerated the procedure well.   Post injection instructions reviewed.   He may follow up in 3 months if he would like right knee CSI. He previously only got a few weeks of relief from CSI. If he does not want CSI he will call in about 5 months to re order VS for 6 months.       Large joint arthrocentesis: R knee  [place] Protocol:  procedure performed by consultantConsent: Verbal consent obtained.  Risks and benefits: risks, benefits and alternatives were discussed  Consent given by: patient  Site marked: the operative site was marked  Supporting Documentation  Indications: pain   Procedure Details  Location: knee - R knee  Preparation: Patient was prepped and draped in the usual sterile fashion  Needle size: 22 G  Ultrasound guidance: no  Approach: anterolateral  Medications administered: 30 mg sodium hyaluronate 30 mg/2 mL  Specialty Pharmacy Supplied: received medications from pharmacy  Patient tolerance: patient tolerated the procedure well with no immediate complications  Dressing:  Sterile dressing applied

## 2024-10-29 DIAGNOSIS — I10 ESSENTIAL HYPERTENSION: ICD-10-CM

## 2024-10-29 RX ORDER — FUROSEMIDE 20 MG/1
TABLET ORAL
Qty: 30 TABLET | Refills: 5 | Status: SHIPPED | OUTPATIENT
Start: 2024-10-29

## 2024-11-20 ENCOUNTER — OFFICE VISIT (OUTPATIENT)
Dept: SLEEP CENTER | Facility: CLINIC | Age: 89
End: 2024-11-20
Payer: COMMERCIAL

## 2024-11-20 VITALS
WEIGHT: 209 LBS | SYSTOLIC BLOOD PRESSURE: 120 MMHG | HEIGHT: 71 IN | DIASTOLIC BLOOD PRESSURE: 63 MMHG | BODY MASS INDEX: 29.26 KG/M2 | HEART RATE: 59 BPM

## 2024-11-20 DIAGNOSIS — G47.33 OBSTRUCTIVE SLEEP APNEA TREATED WITH CONTINUOUS POSITIVE AIRWAY PRESSURE (CPAP): Primary | ICD-10-CM

## 2024-11-20 PROCEDURE — G2211 COMPLEX E/M VISIT ADD ON: HCPCS | Performed by: NURSE PRACTITIONER

## 2024-11-20 PROCEDURE — 99213 OFFICE O/P EST LOW 20 MIN: CPT | Performed by: NURSE PRACTITIONER

## 2024-11-20 RX ORDER — HYLAN G-F 20 16MG/2ML
SYRINGE (ML) INTRAARTICULAR
COMMUNITY
Start: 2024-09-12

## 2024-11-20 NOTE — PROGRESS NOTES
Progress Note - [facility]   Manoj Diamond 89 y.o. male   :1935, MRN: [id number]  2024      Follow Up Evaluation / Problem:  Moderate to Severe Obstructive Sleep Apnea  Hx of epilepsy - controlled with Keppra      Thank you for the opportunity of participating in the evaluation and care of this patient in the Sleep Clinic at [facility].      HPI: Manoj Diamond is a 89 y.o. year old male.  The patient presents for follow up of obstructive sleep apnea.  He was initially diagnosed with SHON in , after suffering from a seizure.  He has been using CPAP equipment since initial diagnosis.    Later testing included a planned split night study in 2017.  Respiratory events occurred later in the night during the study, resulting in a diagnostic study only, with AHI of 19.2, worsening to 72 when supine with oxygen guru of 87%.  A CPAP titration study was then completed with titration to 9cm of water pressure.  He received equipment in 2018.  He later received the DreamStation 2 from the [employer].  He was eligible to have his equipment replaced and chose to have replacement due to  warnings regarding the Dream Station 2 equipment. He presents today to review compliance and effectiveness of PAP therapy with his new equipment.      Current Outpatient Medications:     acetaminophen (TYLENOL) 500 mg tablet, Take 500 mg by mouth 2 (two) times a day, Disp: , Rfl:     DAILY MULTIPLE VITAMINS tablet, Take 1 tablet by mouth daily, Disp: , Rfl:     furosemide (LASIX) 20 mg tablet, TAKE 1 TABLET BY MOUTH EVERY DAY, Disp: 30 tablet, Rfl: 5    levETIRAcetam (KEPPRA) 1000 MG tablet, Take 1 tablet by mouth 2 (two) times a day, Disp: , Rfl:     rosuvastatin (CRESTOR) 5 mg tablet, TAKE 1 TABLET BY MOUTH EVERY DAY, Disp: 90 tablet, Rfl: 3    Synvisc injection, , Disp: , Rfl:     How likely are you to doze off or fall asleep in the following situations, in  "contrast to feeling just tired?  Sitting and reading: Slight chance of dozing  Watching TV: Slight chance of dozing  Sitting, inactive in a public place (e.g. a theatre or a meeting): Would never doze  As a passenger in a car for an hour without a break: Would never doze  Lying down to rest in the afternoon when circumstances permit: Would never doze  Sitting and talking to someone: Would never doze  Sitting quietly after a lunch without alcohol: Would never doze  In a car, while stopped for a few minutes in traffic: Would never doze  Total score: 2              Vitals:    11/20/24 1449   BP: 120/63   BP Location: Left arm   Patient Position: Sitting   Cuff Size: Large   Pulse: 59   Weight: 94.8 kg (209 lb)   Height: 5' 11\" (1.803 m)       Body mass index is 29.15 kg/m².       EPWORTH SLEEPINESS SCORE  Total score: 2      Past History Since Last Sleep Center Visit:   He denies any changes to his health since his last visit.  Weight has been  stable.  He received the [employer]med AirSense 11 CPAP.  He feels the nasal mask and pressure setting are comfortable.  He denies frequent night time awakenings or daytime sleepiness.      He continues to take Keppra daily.  No reports of breakthrough seizure activity.      The review of systems and following portions of the patient's history were reviewed and updated as appropriate: allergies, current medications, past family history, past medical history, past social history, past surgical history, and problem list.      OBJECTIVE  Equipment set up date:  1/9/2018 - received DreamStation 2  PAP Pressure: Nasal CPAP set to deliver 9 cm of water pressure  Type of mask used: nasal  DME Provider: [facility]    Physical Exam:     General Appearance:   Alert, cooperative, no distress, appears stated age, overweight     Lungs:    Heart:  Clear to auscultation bilaterally, respirations unlabored      Regular rate and rhythm, S1 and S2 normal, no murmur, rub or gallop        "       ASSESSMENT / PLAN    1. Obstructive sleep apnea treated with continuous positive airway pressure (CPAP)  PAP DME Resupply/Reorder          Counseling / Coordination of Care  I have spent a total time of 25 minutes on 11/20/24 in caring for this patient including Risks and benefits of tx options, Instructions for management, Patient and family education, Importance of tx compliance, Risk factor reductions, Impressions, Counseling / Coordination of care, Documenting in the medical record, and Reviewing / ordering tests, medicine, procedures  .    Today I reviewed the patient's compliance data.  he has been able to use the equipment 100% of all days recorded.  Average usage was 4 or more hours 100% of all days recorded.  The estimated AHI is 1.0 abnormal breathing events per hour.  He is at goal for hours of use and effectiveness of treatment.  The patient feels they benefit from the use of PAP equipment and would like to continue PAP therapy.  Response to treatment has been excellent.  A prescription for supplies has been provided to last for the next year.    He will continue using his current equipment at the settings noted above.  He will schedule an appointment for compliance follow up in one year.  I have asked the patient to contact the [facility] Disorders Center if he encounters any difficulties prior to that time.    The following instructions have been given to the patient today:    Patient Instructions   1.  Continue use of CPAP equipment nightly  2.  Continue to clean your equipment, as discussed  3.  Contact the Sleep Disorders Center with any questions or concerns prior to your next visit, as needed  4.  Schedule visit for follow-up in 1 year       Nursing Support:  When:  Monday through Friday 7:00am-5:00pm, except holidays  Where:  Direct phone line is [phone], option 3  If you are having a true emergency, please call 911.  In the event that the line is busy or it is after hours, please leave a  voice mail message and we will return your call.  Please speak clearly, leaving your full name, birth date, best number to reach you and the reason for your call.  Medication refills:  We will need the name of the medication, the dosage, the ordering provider, whether you get a 30 day or 90 day refill and the pharmacy name and address.  Medications will be ordered by the providers only.  Nurses cannot call in prescriptions.    To reach the Sleep Disorders Center office staff:  Call [phone], option 1, followed by option 3    DONOVAN Adams  [facility]

## 2024-11-20 NOTE — PATIENT INSTRUCTIONS
1.  Continue use of CPAP equipment nightly  2.  Continue to clean your equipment, as discussed  3.  Contact the Sleep Disorders Center with any questions or concerns prior to your next visit, as needed  4.  Schedule visit for follow-up in 1 year       Nursing Support:  When:  Monday through Friday 7:00am-5:00pm, except holidays  Where:  Direct phone line is [phone], option 3  If you are having a true emergency, please call 911.  In the event that the line is busy or it is after hours, please leave a voice mail message and we will return your call.  Please speak clearly, leaving your full name, birth date, best number to reach you and the reason for your call.  Medication refills:  We will need the name of the medication, the dosage, the ordering provider, whether you get a 30 day or 90 day refill and the pharmacy name and address.  Medications will be ordered by the providers only.  Nurses cannot call in prescriptions.    To reach the [facility] office staff:  Call [phone], option 1, followed by option 3

## 2024-11-22 ENCOUNTER — TELEPHONE (OUTPATIENT)
Dept: SLEEP CENTER | Facility: CLINIC | Age: 89
End: 2024-11-22

## 2024-11-27 DIAGNOSIS — E78.00 HYPERCHOLESTEREMIA: ICD-10-CM

## 2024-11-27 RX ORDER — ROSUVASTATIN CALCIUM 5 MG/1
5 TABLET, COATED ORAL DAILY
Qty: 90 TABLET | Refills: 1 | Status: SHIPPED | OUTPATIENT
Start: 2024-11-27

## 2025-01-17 ENCOUNTER — OFFICE VISIT (OUTPATIENT)
Dept: OBGYN CLINIC | Facility: MEDICAL CENTER | Age: 89
End: 2025-01-17
Payer: COMMERCIAL

## 2025-01-17 VITALS — WEIGHT: 209.4 LBS | BODY MASS INDEX: 29.31 KG/M2 | HEIGHT: 71 IN

## 2025-01-17 DIAGNOSIS — M17.11 PRIMARY OSTEOARTHRITIS OF RIGHT KNEE: Primary | ICD-10-CM

## 2025-01-17 PROCEDURE — 99213 OFFICE O/P EST LOW 20 MIN: CPT | Performed by: ORTHOPAEDIC SURGERY

## 2025-01-17 PROCEDURE — 20610 DRAIN/INJ JOINT/BURSA W/O US: CPT | Performed by: ORTHOPAEDIC SURGERY

## 2025-01-17 RX ORDER — BUPIVACAINE HYDROCHLORIDE 2.5 MG/ML
2 INJECTION, SOLUTION INFILTRATION; PERINEURAL
Status: COMPLETED | OUTPATIENT
Start: 2025-01-17 | End: 2025-01-17

## 2025-01-17 RX ORDER — TRIAMCINOLONE ACETONIDE 40 MG/ML
40 INJECTION, SUSPENSION INTRA-ARTICULAR; INTRAMUSCULAR
Status: COMPLETED | OUTPATIENT
Start: 2025-01-17 | End: 2025-01-17

## 2025-01-17 RX ADMIN — BUPIVACAINE HYDROCHLORIDE 2 ML: 2.5 INJECTION, SOLUTION INFILTRATION; PERINEURAL at 09:15

## 2025-01-17 RX ADMIN — TRIAMCINOLONE ACETONIDE 40 MG: 40 INJECTION, SUSPENSION INTRA-ARTICULAR; INTRAMUSCULAR at 09:15

## 2025-01-17 NOTE — PROGRESS NOTES
Assessment/Plan:  1. Primary osteoarthritis of right knee      Orders Placed This Encounter   Procedures    Large joint arthrocentesis: R knee    Injection Procedure Prior Authorization     Patient has severe right knee osteoarthritis.  After a discussion of risks and benefits the patient elected to proceed with a right knee steroid injection today.  Patient should ice and avoid strenuous activity for 1-2 days if needed.  Patient should avoid vaccines for 2 weeks if possible.  If patient is diabetic should also monitor glucose over the next 7 to 10 days.    Patient continues to receive symptomatic relief from 3 series gel injections.  Patient rates his pain at 2/10 at today's visit.  Viscosupplementation was ordered at today's visit for him to have right knee gel injection anytime after 4/18/2025.  Continue to take Tylenol 1,000mg by mouth every 8 hours as needed for pain.  Do not exceed 3,000mg per day.    Continue activity as tolerated.  Ice, heat, and topical gels as needed.    Return for Right knee gel injection.    I answered all of the patient's questions during the visit and provided education of the patient's condition during the visit.  The patient verbalized understanding of the information given and agrees with the plan.  This note was dictated using [employer] software.  It may contain errors including improperly dictated words.  Please contact physician directly for any questions.    Subjective   Chief Complaint: No chief complaint on file.      MIGUEL ANGEL Diamond is a 89 y.o. male who presents for follow up for severe right knee osteoarthritis.  Patient was last seen 10/18/2024 where patient received right knee Synvisc gel injection.  Patient states he is still receiving symptomatic relief from gel injection administered 10/18/2024.  Patient states he has been taking Tylenol once a day as needed for pain control.  Patient does not utilize any OTC bracing.  Patient does not appear to be in any HEP or PT.  patient is interested in right knee CSI today.    Review of Systems  ROS:    See HPI for musculoskeletal review.   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  Absence seizures in  when switching from dilantin to keppra    DVT (deep venous thrombosis) ([facility])     Occurred after fall with fracture patella    Hyperlipidemia     Hypertension     Seizures ([facility])      Past Surgical History:   Procedure Laterality Date    BLEPHAROPLASTY      last assessed: 2017; upper lid w/ excessive skin     COLONOSCOPY W/ POLYPECTOMY      MOUTH SURGERY      gingival incision due to Dilantin therapy      Social History   Social History     Substance and Sexual Activity   Alcohol Use Yes    Alcohol/week: 5.0 standard drinks of alcohol    Types: 5 Standard drinks or equivalent per week    Comment: Social      Social History     Substance and Sexual Activity   Drug Use No     Social History     Tobacco Use   Smoking Status Former    Current packs/day: 0.00    Average packs/day: 1 pack/day for 10.0 years (10.0 ttl pk-yrs)    Types: Cigarettes    Start date: 1950    Quit date: 1960    Years since quittin.0   Smokeless Tobacco Never   Tobacco Comments    Quit in the 's     Family History:   Family History   Problem Relation Age of Onset    Stroke Mother     Heart failure Father     Stroke Family         syndrome       Current Outpatient Medications on File Prior to Visit   Medication Sig Dispense Refill    acetaminophen (TYLENOL) 500 mg tablet Take 500 mg by mouth 2 (two) times a day      DAILY MULTIPLE VITAMINS tablet Take 1 tablet by mouth daily      furosemide (LASIX) 20 mg tablet TAKE 1 TABLET BY MOUTH EVERY DAY 30 tablet 5    levETIRAcetam (KEPPRA) 1000 MG tablet  "Take 1 tablet by mouth 2 (two) times a day      rosuvastatin (CRESTOR) 5 mg tablet TAKE 1 TABLET BY MOUTH EVERY DAY 90 tablet 1    Synvisc injection        No current facility-administered medications on file prior to visit.     No Known Allergies     Objective     Ht 5' 11\" (1.803 m)   Wt 95 kg (209 lb 6.4 oz)   BMI 29.21 kg/m²      PE:  AAOx 3  WDWN  Hearing intact, no drainage from eyes  no audible wheezing  no abdominal distension  LE compartments soft, skin intact    Ortho Exam:  right Knee:   No erythema  no swelling  no effusion  no warmth  AROM: 0-115  Stable to varus/valgus stress      Large joint arthrocentesis: R knee  [place] Protocol:  procedure performed by consultantConsent: Verbal consent obtained.  Risks and benefits: risks, benefits and alternatives were discussed  Consent given by: patient  Patient understanding: patient states understanding of the procedure being performed  Site marked: the operative site was marked  Patient identity confirmed: verbally with patient  Supporting Documentation  Indications: pain   Procedure Details  Location: knee - R knee  Needle size: 22 G  Ultrasound guidance: no  Approach: anterolateral  Medications administered: 2 mL bupivacaine 0.25 %; 40 mg triamcinolone acetonide 40 mg/mL    Patient tolerance: patient tolerated the procedure well with no immediate complications  Dressing:  Sterile dressing applied            Scribe Attestation      I,:  Collin Payne am acting as a scribe while in the presence of the attending physician.:       I,:  Mellissa Lisa DO personally performed the services described in this documentation    as scribed in my presence.:                  "

## 2025-02-04 ENCOUNTER — RESULTS FOLLOW-UP (OUTPATIENT)
Dept: FAMILY MEDICINE CLINIC | Facility: CLINIC | Age: 89
End: 2025-02-04

## 2025-02-04 ENCOUNTER — APPOINTMENT (OUTPATIENT)
Dept: LAB | Facility: CLINIC | Age: 89
End: 2025-02-04
Payer: COMMERCIAL

## 2025-02-04 DIAGNOSIS — E78.00 HYPERCHOLESTEREMIA: ICD-10-CM

## 2025-02-04 DIAGNOSIS — G40.209 LOCALIZATION-RELATED FOCAL EPILEPSY WITH COMPLEX PARTIAL SEIZURES (HCC): ICD-10-CM

## 2025-02-04 DIAGNOSIS — D69.6 THROMBOCYTOPENIA (HCC): ICD-10-CM

## 2025-02-04 DIAGNOSIS — I10 ESSENTIAL HYPERTENSION: ICD-10-CM

## 2025-02-04 DIAGNOSIS — D72.810 LYMPHOPENIA: ICD-10-CM

## 2025-02-04 LAB
ALBUMIN SERPL BCG-MCNC: 4.4 G/DL (ref 3.5–5)
ALP SERPL-CCNC: 84 U/L (ref 34–104)
ALT SERPL W P-5'-P-CCNC: 18 U/L (ref 7–52)
ANION GAP SERPL CALCULATED.3IONS-SCNC: 8 MMOL/L (ref 4–13)
AST SERPL W P-5'-P-CCNC: 19 U/L (ref 13–39)
BASOPHILS # BLD AUTO: 0.02 THOUSANDS/ΜL (ref 0–0.1)
BASOPHILS NFR BLD AUTO: 0 % (ref 0–1)
BILIRUB SERPL-MCNC: 0.74 MG/DL (ref 0.2–1)
BUN SERPL-MCNC: 28 MG/DL (ref 5–25)
CALCIUM SERPL-MCNC: 9.2 MG/DL (ref 8.4–10.2)
CHLORIDE SERPL-SCNC: 107 MMOL/L (ref 96–108)
CHOLEST SERPL-MCNC: 169 MG/DL (ref ?–200)
CO2 SERPL-SCNC: 31 MMOL/L (ref 21–32)
CREAT SERPL-MCNC: 0.99 MG/DL (ref 0.6–1.3)
EOSINOPHIL # BLD AUTO: 0.1 THOUSAND/ΜL (ref 0–0.61)
EOSINOPHIL NFR BLD AUTO: 2 % (ref 0–6)
ERYTHROCYTE [DISTWIDTH] IN BLOOD BY AUTOMATED COUNT: 13.3 % (ref 11.6–15.1)
GFR SERPL CREATININE-BSD FRML MDRD: 67 ML/MIN/1.73SQ M
GLUCOSE P FAST SERPL-MCNC: 92 MG/DL (ref 65–99)
HCT VFR BLD AUTO: 48.1 % (ref 36.5–49.3)
HDLC SERPL-MCNC: 63 MG/DL
HGB BLD-MCNC: 15.9 G/DL (ref 12–17)
IMM GRANULOCYTES # BLD AUTO: 0.02 THOUSAND/UL (ref 0–0.2)
IMM GRANULOCYTES NFR BLD AUTO: 0 % (ref 0–2)
LDLC SERPL CALC-MCNC: 86 MG/DL (ref 0–100)
LEVETIRACETAM SERPL-MCNC: 20.8 UG/ML (ref 12–46)
LYMPHOCYTES # BLD AUTO: 1.04 THOUSANDS/ΜL (ref 0.6–4.47)
LYMPHOCYTES NFR BLD AUTO: 20 % (ref 14–44)
MCH RBC QN AUTO: 33.3 PG (ref 26.8–34.3)
MCHC RBC AUTO-ENTMCNC: 33.1 G/DL (ref 31.4–37.4)
MCV RBC AUTO: 101 FL (ref 82–98)
MONOCYTES # BLD AUTO: 0.48 THOUSAND/ΜL (ref 0.17–1.22)
MONOCYTES NFR BLD AUTO: 9 % (ref 4–12)
NEUTROPHILS # BLD AUTO: 3.44 THOUSANDS/ΜL (ref 1.85–7.62)
NEUTS SEG NFR BLD AUTO: 69 % (ref 43–75)
NRBC BLD AUTO-RTO: 0 /100 WBCS
PLATELET # BLD AUTO: 120 THOUSANDS/UL (ref 149–390)
PMV BLD AUTO: 10.6 FL (ref 8.9–12.7)
POTASSIUM SERPL-SCNC: 4.8 MMOL/L (ref 3.5–5.3)
PROT SERPL-MCNC: 6.3 G/DL (ref 6.4–8.4)
RBC # BLD AUTO: 4.77 MILLION/UL (ref 3.88–5.62)
SODIUM SERPL-SCNC: 146 MMOL/L (ref 135–147)
TRIGL SERPL-MCNC: 99 MG/DL (ref ?–150)
WBC # BLD AUTO: 5.1 THOUSAND/UL (ref 4.31–10.16)

## 2025-02-04 PROCEDURE — 80053 COMPREHEN METABOLIC PANEL: CPT

## 2025-02-04 PROCEDURE — 80061 LIPID PANEL: CPT

## 2025-02-04 PROCEDURE — 85025 COMPLETE CBC W/AUTO DIFF WBC: CPT

## 2025-02-04 PROCEDURE — 83520 IMMUNOASSAY QUANT NOS NONAB: CPT

## 2025-02-04 PROCEDURE — 36415 COLL VENOUS BLD VENIPUNCTURE: CPT

## 2025-02-12 ENCOUNTER — OFFICE VISIT (OUTPATIENT)
Dept: FAMILY MEDICINE CLINIC | Facility: CLINIC | Age: 89
End: 2025-02-12
Payer: COMMERCIAL

## 2025-02-12 VITALS
WEIGHT: 207 LBS | DIASTOLIC BLOOD PRESSURE: 70 MMHG | OXYGEN SATURATION: 98 % | BODY MASS INDEX: 28.87 KG/M2 | SYSTOLIC BLOOD PRESSURE: 130 MMHG | RESPIRATION RATE: 12 BRPM | HEART RATE: 61 BPM

## 2025-02-12 DIAGNOSIS — D69.6 THROMBOCYTOPENIA (HCC): ICD-10-CM

## 2025-02-12 DIAGNOSIS — D72.810 LYMPHOPENIA: ICD-10-CM

## 2025-02-12 DIAGNOSIS — E78.00 HYPERCHOLESTEREMIA: ICD-10-CM

## 2025-02-12 DIAGNOSIS — Z83.49 FAMILY HISTORY OF HEMOCHROMATOSIS: ICD-10-CM

## 2025-02-12 DIAGNOSIS — G40.209 LOCALIZATION-RELATED FOCAL EPILEPSY WITH COMPLEX PARTIAL SEIZURES (HCC): ICD-10-CM

## 2025-02-12 DIAGNOSIS — G47.33 OBSTRUCTIVE SLEEP APNEA TREATED WITH CONTINUOUS POSITIVE AIRWAY PRESSURE (CPAP): ICD-10-CM

## 2025-02-12 DIAGNOSIS — M17.11 PRIMARY OSTEOARTHRITIS OF RIGHT KNEE: ICD-10-CM

## 2025-02-12 DIAGNOSIS — I10 ESSENTIAL HYPERTENSION: Primary | ICD-10-CM

## 2025-02-12 DIAGNOSIS — D18.00 CAVERNOMA: ICD-10-CM

## 2025-02-12 DIAGNOSIS — M46.1 SI (SACROILIAC) JOINT INFLAMMATION (HCC): ICD-10-CM

## 2025-02-12 PROCEDURE — 99214 OFFICE O/P EST MOD 30 MIN: CPT | Performed by: FAMILY MEDICINE

## 2025-02-12 PROCEDURE — G2211 COMPLEX E/M VISIT ADD ON: HCPCS | Performed by: FAMILY MEDICINE

## 2025-02-12 NOTE — ASSESSMENT & PLAN NOTE
Controlled at this time.  Continue current regimen.  Orders:    CBC and differential; Future    Comprehensive metabolic panel; Future

## 2025-02-12 NOTE — ASSESSMENT & PLAN NOTE
Continue neurology follow-up.  He remains on Keppra        No joint pain, swelling or deformity; no limitation of movement

## 2025-02-12 NOTE — ASSESSMENT & PLAN NOTE
Stable on last CBC.        Tip Override Procedure: Extraction Number Of Passes: 1 Solution: Beta-HD Number Of Passes: 2 Vacuum Pressure Low Setting (Will Not Render If Set To 0): 0 Procedure: Extend and Protect Tip: Hydropeel Tip, Teal Vacuum Pressure Low Setting (Will Not Render If Set To 0): 15 Solution: Antiox-6 Indication: skin texture Procedure: Peel Tip: Hydropeel Tip, Blue Procedure: Fusion Solution Override Procedure: Exfoliation Post-Care Instructions: I reviewed with the patient in detail post-care instructions. Patient should stay away from the sun and wear sun protection until treated areas are fully healed. Consent: Written consent obtained, risks reviewed including but not limited to crusting, scabbing, blistering, scarring, darker or lighter pigmentary change, bruising, and/or incomplete response. Tip: Hydropeel Tip, Clear Location: face Solution: GlySal 7.5% Solution: Activ-4 Price (Use Numbers Only, No Special Characters Or $): 175

## 2025-02-12 NOTE — PROGRESS NOTES
"Name: Manoj Diamond      : 1935      MRN: [id number]  Encounter Provider: Carlos Brandt Jr, MD  Encounter Date: 2025   Encounter department: [facility]  :  Assessment & Plan  Essential hypertension  Controlled at this time.  Continue current regimen.  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  He remains on Keppra       Primary osteoarthritis of right knee  Continue orthopedic follow-up       Thrombocytopenia (HCC)  Stable on recent labs.  Notifies me that his son is recently been diagnosed with \"high iron\".  Check iron studies with his next labs  Orders:    Iron, TIBC and Ferritin Panel; Future    SI (sacroiliac) joint inflammation (HCC)  Stable at this time       Hypercholesteremia  Continue statin therapy  Orders:    CBC and differential; Future    Comprehensive metabolic panel; Future    Lipid Panel with Direct LDL reflex; Future    Cavernoma  Follow-up with neurology       Family history of hemochromatosis  I will check iron stores with his next labs.    Orders:    Iron, TIBC and Ferritin Panel; Future    Lymphopenia  Stable on last CBC.              History of Present Illness   HPI patient presents today for follow-up for his chronic health issues.  He notes he is feeling quite well.  He is walking for exercise without any cardiovascular imitations.  He denies any chest pain, shortness breath or palpitation.  Son recently was diagnosed with \"high iron\" and is now giving blood as a treatment plan.  He has never had any known issues with hemochromatosis etc. and that within the family.  Review of Systems   Constitutional:  Negative for appetite change, chills, fatigue, fever and unexpected weight change.   HENT:  " Negative for trouble swallowing.    Eyes:  Negative for visual disturbance.   Respiratory:  Negative for cough, chest tightness, shortness of breath and wheezing.    Cardiovascular:  Negative for chest pain, palpitations and leg swelling.   Gastrointestinal:  Negative for abdominal distention, abdominal pain, blood in stool, constipation and diarrhea.   Endocrine: Negative for polyuria.   Genitourinary:  Negative for difficulty urinating and flank pain.   Musculoskeletal:  Negative for arthralgias and myalgias.   Skin:  Negative for rash.   Neurological:  Negative for dizziness and light-headedness.   Hematological:  Negative for adenopathy. Does not bruise/bleed easily.   Psychiatric/Behavioral:  Negative for dysphoric mood and sleep disturbance. The patient is not nervous/anxious.        Objective   /70 (BP Location: Left arm, Patient Position: Sitting, Cuff Size: Standard)   Pulse 61   Resp 12   Wt 93.9 kg (207 lb)   SpO2 98%   BMI 28.87 kg/m²      Physical Exam  Constitutional:       General: He is not in acute distress.     Appearance: Normal appearance. He is well-developed. He is not diaphoretic.   HENT:      Head: Normocephalic.      Right Ear: External ear normal.      Left Ear: External ear normal.      Nose: Nose normal.   Eyes:      General:         Right eye: No discharge.         Left eye: No discharge.      Conjunctiva/sclera: Conjunctivae normal.      Pupils: Pupils are equal, round, and reactive to light.   Neck:      Thyroid: No thyromegaly.      Trachea: No tracheal deviation.   Cardiovascular:      Rate and Rhythm: Normal rate and regular rhythm.      Heart sounds: Normal heart sounds. No murmur heard.     No friction rub.   Pulmonary:      Effort: Pulmonary effort is normal. No respiratory distress.      Breath sounds: Normal breath sounds. No wheezing.   Chest:      Chest wall: No tenderness.   Abdominal:      General: There is no distension.      Palpations: There is no mass.       Tenderness: There is no abdominal tenderness. There is no guarding or rebound.      Hernia: No hernia is present.   Musculoskeletal:         General: No swelling or deformity.      Cervical back: Normal range of motion.      Right lower leg: No edema.      Left lower leg: No edema.   Skin:     Findings: No erythema or rash.   Neurological:      General: No focal deficit present.      Mental Status: He is alert.      Cranial Nerves: No cranial nerve deficit.      Coordination: Coordination normal.   Psychiatric:         Thought Content: Thought content normal.

## 2025-02-12 NOTE — ASSESSMENT & PLAN NOTE
"Stable on recent labs.  Notifies me that his son is recently been diagnosed with \"high iron\".  Check iron studies with his next labs  Orders:    Iron, TIBC and Ferritin Panel; Future    "

## 2025-02-12 NOTE — ASSESSMENT & PLAN NOTE
I will check iron stores with his next labs.    Orders:    Iron, TIBC and Ferritin Panel; Future

## 2025-03-27 ENCOUNTER — TELEPHONE (OUTPATIENT)
Dept: OBGYN CLINIC | Facility: MEDICAL CENTER | Age: 89
End: 2025-03-27

## 2025-03-27 NOTE — TELEPHONE ENCOUNTER
Caller: Patient     Doctor: Dr. Lisa    Reason for call:  Bruc called back to reschedule his visco appts.     4/23 patient is requesting a 9:30am appt. Please add to schedule. Thank you.    4/30 8:15am - scheduled  5/7 11:30am - scheduled    Call back#: [phone]

## 2025-03-27 NOTE — TELEPHONE ENCOUNTER
Can I overbook this patient to begin his right knee Synvisc Series starting on 04/23/2025 @ 9:30am per patient request?  The rest of his other 2 appts have already been scheduled. Thank you

## 2025-03-27 NOTE — TELEPHONE ENCOUNTER
Dr. Lisa will not be in the office on Friday 05/02/2025.     Patient's second Synvisc series was for 05/02.      I called & left a detailed message for patient.   I asked him to call back to reschedule his gel injection appts. My proposal was that he come in to start his Rt Knee Synvisc series beginning on Wed. 04/23, Wed. 04/30,  and lastly on Wed. May 7th.     If patient has conflicting engagements, we cn consider having Chirag Trinidad complete his gel injections on the dates at the [facility] office as well.

## 2025-04-18 DIAGNOSIS — I10 ESSENTIAL HYPERTENSION: ICD-10-CM

## 2025-04-18 RX ORDER — FUROSEMIDE 20 MG/1
20 TABLET ORAL DAILY
Qty: 30 TABLET | Refills: 5 | Status: SHIPPED | OUTPATIENT
Start: 2025-04-18

## 2025-04-23 ENCOUNTER — PROCEDURE VISIT (OUTPATIENT)
Dept: OBGYN CLINIC | Facility: MEDICAL CENTER | Age: 89
End: 2025-04-23
Payer: COMMERCIAL

## 2025-04-23 VITALS — WEIGHT: 212.8 LBS | BODY MASS INDEX: 29.79 KG/M2 | HEIGHT: 71 IN

## 2025-04-23 DIAGNOSIS — M25.561 CHRONIC PAIN OF RIGHT KNEE: ICD-10-CM

## 2025-04-23 DIAGNOSIS — G89.29 CHRONIC PAIN OF RIGHT KNEE: ICD-10-CM

## 2025-04-23 DIAGNOSIS — M17.11 PRIMARY OSTEOARTHRITIS OF RIGHT KNEE: Primary | ICD-10-CM

## 2025-04-23 PROCEDURE — 20610 DRAIN/INJ JOINT/BURSA W/O US: CPT | Performed by: ORTHOPAEDIC SURGERY

## 2025-04-23 NOTE — PROGRESS NOTES
1. Primary osteoarthritis of right knee        2. Chronic pain of right knee            Patient has right knee osteoarthritis.  Patient is here for his right injection of Synvisc #1 into the right knee.   Physical exam of the knee shows no effusion no ecchymosis.  Patient tolerated procedure follow up 1 week for injection #2 right knee    Large joint arthrocentesis: R knee  [place] Protocol:  procedure performed by consultantConsent: Verbal consent obtained. Written consent not obtained.  Risks and benefits: risks, benefits and alternatives were discussed  Consent given by: patient  Patient understanding: patient states understanding of the procedure being performed  Patient consent: the patient's understanding of the procedure matches consent given  Patient identity confirmed: verbally with patient  Supporting Documentation  Indications: pain and joint swelling     Is this a Visco injection? Yes  Non-Pharmacologic Treatments Attempted: Home Exercise and Physical Therapy  Pharmacologic Treatments Attempted: Voltaren gel, tyleno. NSAIDS  Pain Score: 0Procedure Details  Location: knee - R knee  Needle size: 22 G  Ultrasound guidance: no  Approach: anterolateral  Medications administered: 16 mg hylan 16 MG/2ML  Specialty Pharmacy Supplied: received medications from pharmacy  Patient tolerance: patient tolerated the procedure well with no immediate complications  Dressing:  Sterile dressing applied

## 2025-04-30 ENCOUNTER — PROCEDURE VISIT (OUTPATIENT)
Dept: OBGYN CLINIC | Facility: MEDICAL CENTER | Age: 89
End: 2025-04-30
Payer: COMMERCIAL

## 2025-04-30 VITALS — WEIGHT: 210.2 LBS | BODY MASS INDEX: 29.43 KG/M2 | HEIGHT: 71 IN

## 2025-04-30 DIAGNOSIS — M17.11 PRIMARY OSTEOARTHRITIS OF RIGHT KNEE: Primary | ICD-10-CM

## 2025-04-30 PROCEDURE — 20610 DRAIN/INJ JOINT/BURSA W/O US: CPT | Performed by: PHYSICIAN ASSISTANT

## 2025-04-30 NOTE — PROGRESS NOTES
1. Primary osteoarthritis of right knee            Patient has severe right knee osteoarthritis.  Patient is here for his 2nd injection of Synvisc into the right knee.   Physical exam of the knee shows no effusion no ecchymosis.  Patient tolerated procedure follow up 1 week for 3rd injection of synvisc to right knee.    Large joint arthrocentesis: R knee  [place] Protocol:  procedure performed by consultantConsent: Verbal consent obtained.  Risks and benefits: risks, benefits and alternatives were discussed  Consent given by: patient  Patient understanding: patient states understanding of the procedure being performed  Patient identity confirmed: verbally with patient  Supporting Documentation  Indications: pain and diagnostic evaluation     Is this a Visco injection? Yes  Non-Pharmacologic Treatments Attempted: Home Exercise and Physical Therapy  Pharmacologic Treatments Attempted: OTC medication, therapy, Steroid injection  Pain Score: 6Procedure Details  Location: knee - R knee  Preparation: Patient was prepped and draped in the usual sterile fashion  Needle size: 22 G  Ultrasound guidance: no  Approach: anterolateral  Medications administered: 16 mg hylan 16 MG/2ML  Specialty Pharmacy Supplied: received medications from pharmacy  Patient tolerance: patient tolerated the procedure well with no immediate complications  Dressing:  Sterile dressing applied

## 2025-05-07 ENCOUNTER — PROCEDURE VISIT (OUTPATIENT)
Dept: OBGYN CLINIC | Facility: MEDICAL CENTER | Age: 89
End: 2025-05-07
Payer: COMMERCIAL

## 2025-05-07 VITALS — HEIGHT: 71 IN | WEIGHT: 208.2 LBS | BODY MASS INDEX: 29.15 KG/M2

## 2025-05-07 DIAGNOSIS — M17.11 PRIMARY OSTEOARTHRITIS OF RIGHT KNEE: Primary | ICD-10-CM

## 2025-05-07 PROCEDURE — 20610 DRAIN/INJ JOINT/BURSA W/O US: CPT | Performed by: PHYSICIAN ASSISTANT

## 2025-05-07 NOTE — PROGRESS NOTES
1. Primary osteoarthritis of right knee  Large joint arthrocentesis: R knee          Patient has Right knee osteoarthritis.  Patient is here for his 3rd injection of Synvisc into the right knee.   Physical exam of the knee shows no effusion no ecchymosis.  Patient tolerated procedure follow up 3 months for possible CSI injection as patient been alternating    Large joint arthrocentesis: R knee    Performed by: Kaushik Lemus PA-C  Authorized by: Kaushik Lemus PA-C    [place] Protocol:  procedure performed by consultantConsent: Verbal consent obtained. Written consent not obtained.  Risks and benefits: risks, benefits and alternatives were discussed  Consent given by: patient  Patient understanding: patient states understanding of the procedure being performed  Patient consent: the patient's understanding of the procedure matches consent given  Patient identity confirmed: verbally with patient  Supporting Documentation  Indications: pain and joint swelling     Is this a Visco injection? Yes  Non-Pharmacologic Treatments Attempted: Home Exercise and Physical Therapy  Pharmacologic Treatments Attempted: Tylenol, cortisone injecton   Pain Score: 4Procedure Details  Location: knee - R knee  Needle size: 22 G  Ultrasound guidance: no  Approach: anterolateral  Medications administered: 16 mg hylan 16 MG/2ML  Specialty Pharmacy Supplied: received medications from pharmacy  Patient tolerance: patient tolerated the procedure well with no immediate complications  Dressing:  Sterile dressing applied

## 2025-05-17 DIAGNOSIS — E78.00 HYPERCHOLESTEREMIA: ICD-10-CM

## 2025-05-17 RX ORDER — ROSUVASTATIN CALCIUM 5 MG/1
5 TABLET, COATED ORAL DAILY
Qty: 90 TABLET | Refills: 1 | Status: SHIPPED | OUTPATIENT
Start: 2025-05-17

## 2025-08-07 ENCOUNTER — OFFICE VISIT (OUTPATIENT)
Dept: OBGYN CLINIC | Facility: MEDICAL CENTER | Age: 89
End: 2025-08-07
Payer: COMMERCIAL

## 2025-08-07 VITALS — HEIGHT: 71 IN | BODY MASS INDEX: 29.57 KG/M2 | WEIGHT: 211.2 LBS

## 2025-08-07 DIAGNOSIS — G89.29 CHRONIC PAIN OF RIGHT KNEE: ICD-10-CM

## 2025-08-07 DIAGNOSIS — M25.561 CHRONIC PAIN OF RIGHT KNEE: ICD-10-CM

## 2025-08-07 DIAGNOSIS — M17.11 PRIMARY OSTEOARTHRITIS OF RIGHT KNEE: Primary | ICD-10-CM

## 2025-08-07 PROCEDURE — 20610 DRAIN/INJ JOINT/BURSA W/O US: CPT | Performed by: PHYSICIAN ASSISTANT

## 2025-08-07 PROCEDURE — 99213 OFFICE O/P EST LOW 20 MIN: CPT | Performed by: PHYSICIAN ASSISTANT

## 2025-08-07 RX ORDER — TRIAMCINOLONE ACETONIDE 40 MG/ML
40 INJECTION, SUSPENSION INTRA-ARTICULAR; INTRAMUSCULAR
Status: COMPLETED | OUTPATIENT
Start: 2025-08-07 | End: 2025-08-07

## 2025-08-07 RX ORDER — BUPIVACAINE HYDROCHLORIDE 2.5 MG/ML
2 INJECTION, SOLUTION INFILTRATION; PERINEURAL
Status: COMPLETED | OUTPATIENT
Start: 2025-08-07 | End: 2025-08-07

## 2025-08-07 RX ADMIN — BUPIVACAINE HYDROCHLORIDE 2 ML: 2.5 INJECTION, SOLUTION INFILTRATION; PERINEURAL at 11:30

## 2025-08-07 RX ADMIN — TRIAMCINOLONE ACETONIDE 40 MG: 40 INJECTION, SUSPENSION INTRA-ARTICULAR; INTRAMUSCULAR at 11:30
